# Patient Record
Sex: MALE | Race: WHITE | Employment: OTHER | URBAN - METROPOLITAN AREA
[De-identification: names, ages, dates, MRNs, and addresses within clinical notes are randomized per-mention and may not be internally consistent; named-entity substitution may affect disease eponyms.]

---

## 2017-06-28 ENCOUNTER — HOSPITAL ENCOUNTER (OUTPATIENT)
Dept: SURGERY | Age: 77
Discharge: HOME | End: 2017-06-28
Payer: MEDICARE

## 2017-06-28 VITALS
WEIGHT: 188 LBS | BODY MASS INDEX: 26.32 KG/M2 | SYSTOLIC BLOOD PRESSURE: 153 MMHG | DIASTOLIC BLOOD PRESSURE: 84 MMHG | OXYGEN SATURATION: 97 % | RESPIRATION RATE: 18 BRPM | HEIGHT: 71 IN | TEMPERATURE: 97.3 F | HEART RATE: 64 BPM

## 2017-06-28 LAB
ANION GAP BLD CALC-SCNC: 6 MMOL/L (ref 7–16)
APPEARANCE UR: CLEAR
BACTERIA SPEC CULT: ABNORMAL
BASOPHILS # BLD AUTO: 0 K/UL (ref 0–0.2)
BASOPHILS # BLD: 0 % (ref 0–2)
BILIRUB UR QL: NEGATIVE
BUN SERPL-MCNC: 20 MG/DL (ref 8–23)
CALCIUM SERPL-MCNC: 8.6 MG/DL (ref 8.3–10.4)
CHLORIDE SERPL-SCNC: 108 MMOL/L (ref 98–107)
CO2 SERPL-SCNC: 30 MMOL/L (ref 21–32)
COLOR UR: YELLOW
CREAT SERPL-MCNC: 0.99 MG/DL (ref 0.8–1.5)
DIFFERENTIAL METHOD BLD: ABNORMAL
EOSINOPHIL # BLD: 0.3 K/UL (ref 0–0.8)
EOSINOPHIL NFR BLD: 7 % (ref 0.5–7.8)
ERYTHROCYTE [DISTWIDTH] IN BLOOD BY AUTOMATED COUNT: 13.9 % (ref 11.9–14.6)
GLUCOSE SERPL-MCNC: 93 MG/DL (ref 65–100)
GLUCOSE UR STRIP.AUTO-MCNC: NEGATIVE MG/DL
HCT VFR BLD AUTO: 38.3 % (ref 41.1–50.3)
HGB BLD-MCNC: 12.7 G/DL (ref 13.6–17.2)
HGB UR QL STRIP: NEGATIVE
IMM GRANULOCYTES # BLD: 0 K/UL (ref 0–0.5)
IMM GRANULOCYTES NFR BLD AUTO: 0 % (ref 0–5)
KETONES UR QL STRIP.AUTO: NEGATIVE MG/DL
LEUKOCYTE ESTERASE UR QL STRIP.AUTO: NEGATIVE
LYMPHOCYTES # BLD AUTO: 47 % (ref 13–44)
LYMPHOCYTES # BLD: 2.4 K/UL (ref 0.5–4.6)
MCH RBC QN AUTO: 31.9 PG (ref 26.1–32.9)
MCHC RBC AUTO-ENTMCNC: 33.2 G/DL (ref 31.4–35)
MCV RBC AUTO: 96.2 FL (ref 79.6–97.8)
MONOCYTES # BLD: 0.6 K/UL (ref 0.1–1.3)
MONOCYTES NFR BLD AUTO: 13 % (ref 4–12)
NEUTS SEG # BLD: 1.6 K/UL (ref 1.7–8.2)
NEUTS SEG NFR BLD AUTO: 33 % (ref 43–78)
NITRITE UR QL STRIP.AUTO: NEGATIVE
PH UR STRIP: 6 [PH] (ref 5–9)
PLATELET # BLD AUTO: 110 K/UL (ref 150–450)
PMV BLD AUTO: 10.2 FL (ref 10.8–14.1)
POTASSIUM SERPL-SCNC: 4.4 MMOL/L (ref 3.5–5.1)
PROT UR STRIP-MCNC: NEGATIVE MG/DL
RBC # BLD AUTO: 3.98 M/UL (ref 4.23–5.67)
SERVICE CMNT-IMP: ABNORMAL
SODIUM SERPL-SCNC: 144 MMOL/L (ref 136–145)
SP GR UR REFRACTOMETRY: 1.03 (ref 1–1.02)
UROBILINOGEN UR QL STRIP.AUTO: 1 EU/DL (ref 0.2–1)
WBC # BLD AUTO: 5 K/UL (ref 4.3–11.1)

## 2017-06-28 PROCEDURE — 81003 URINALYSIS AUTO W/O SCOPE: CPT | Performed by: ORTHOPAEDIC SURGERY

## 2017-06-28 PROCEDURE — 80048 BASIC METABOLIC PNL TOTAL CA: CPT | Performed by: ORTHOPAEDIC SURGERY

## 2017-06-28 PROCEDURE — 87641 MR-STAPH DNA AMP PROBE: CPT | Performed by: ORTHOPAEDIC SURGERY

## 2017-06-28 PROCEDURE — 85025 COMPLETE CBC W/AUTO DIFF WBC: CPT | Performed by: ORTHOPAEDIC SURGERY

## 2017-06-28 NOTE — PERIOP NOTES
[facility]upirocin called into [facility]-pt contacted and informed to start mupirocin on 7-1-17- and to follow instructions on education sheet given pt verbalized understanding.

## 2017-06-28 NOTE — PERIOP NOTES
Thais/Dr. Von Miner informed of positive SA swab- verbalized understanding- starting mupirocin ointment

## 2017-06-28 NOTE — PERIOP NOTES
Patient verified name, , and surgery as listed in [facility]. TYPE  CASE:3  Orders per surgeonyesreceived  Labs per surgeon: cbc,bmp,urine,mrsa swab:labs -  Labs per anesthesia protocol: type and screen-dos results-  EKG  :  yes  MRSA/MSSA:pending  Pt instructed that they will be notified of positive results and will use mupirocin ointment as directed. Patient provided with handouts including guide to surgery , transfusions, pain management and hand hygiene for the family and community. Pt verbalizes understanding of all pre-op instructions . Instructed that family must be present in building at all times. Hibiclens and instructions given per hospital policy. Instructed patient to continue  previous medications as prescribed prior to surgery and  to take the following medications the day of surgery according to anesthesia guidelines : zantac as needed,tramadol       Continue all previous medications unless otherwise directed. Original medication prescription bottles none visualized during patient appointment. Instructed patient to hold  the following medications prior to surgery: plavix      Patient verbalized understanding of all instructions and provided all medical/health information to the best of their ability. Road to Recovery Spine surgery patient guide given. Instructed on incentive spirometry with return demonstration. Long handled prehab sponge given with instructions for use. Patient viewed spine prehab video.

## 2017-07-05 ENCOUNTER — ANESTHESIA EVENT (OUTPATIENT)
Dept: SURGERY | Age: 77
DRG: 458 | End: 2017-07-05
Payer: MEDICARE

## 2017-07-06 ENCOUNTER — ANESTHESIA (OUTPATIENT)
Dept: SURGERY | Age: 77
DRG: 458 | End: 2017-07-06
Payer: MEDICARE

## 2017-07-06 ENCOUNTER — HOSPITAL ENCOUNTER (INPATIENT)
Age: 77
LOS: 3 days | Discharge: HOME | DRG: 458 | End: 2017-07-09
Attending: ORTHOPAEDIC SURGERY | Admitting: ORTHOPAEDIC SURGERY
Payer: MEDICARE

## 2017-07-06 ENCOUNTER — APPOINTMENT (OUTPATIENT)
Dept: GENERAL RADIOLOGY | Age: 77
DRG: 458 | End: 2017-07-06
Attending: ORTHOPAEDIC SURGERY
Payer: MEDICARE

## 2017-07-06 LAB
ABO + RH BLD: NORMAL
BLOOD GROUP ANTIBODIES SERPL: NORMAL
SPECIMEN EXP DATE BLD: NORMAL

## 2017-07-06 PROCEDURE — 77030034760 HC NDL BN MAR ASPIR JAMSH STRY -B: Performed by: ORTHOPAEDIC SURGERY

## 2017-07-06 PROCEDURE — 77030019557 HC ELECTRD VES SEAL MEDT -F: Performed by: ORTHOPAEDIC SURGERY

## 2017-07-06 PROCEDURE — C1713 ANCHOR/SCREW BN/BN,TIS/BN: HCPCS | Performed by: ORTHOPAEDIC SURGERY

## 2017-07-06 PROCEDURE — 77030034850: Performed by: ORTHOPAEDIC SURGERY

## 2017-07-06 PROCEDURE — 77030008477 HC STYL SATN SLP COVD -A: Performed by: ANESTHESIOLOGY

## 2017-07-06 PROCEDURE — 76060000036 HC ANESTHESIA 2.5 TO 3 HR: Performed by: ORTHOPAEDIC SURGERY

## 2017-07-06 PROCEDURE — 77030012894: Performed by: ORTHOPAEDIC SURGERY

## 2017-07-06 PROCEDURE — 0SG0071 FUSION OF LUMBAR VERTEBRAL JOINT WITH AUTOLOGOUS TISSUE SUBSTITUTE, POSTERIOR APPROACH, POSTERIOR COLUMN, OPEN APPROACH: ICD-10-PCS | Performed by: ORTHOPAEDIC SURGERY

## 2017-07-06 PROCEDURE — 74011250636 HC RX REV CODE- 250/636

## 2017-07-06 PROCEDURE — 74011250637 HC RX REV CODE- 250/637: Performed by: ANESTHESIOLOGY

## 2017-07-06 PROCEDURE — 77030020782 HC GWN BAIR PAWS FLX 3M -B: Performed by: ANESTHESIOLOGY

## 2017-07-06 PROCEDURE — 77030019908 HC STETH ESOPH SIMS -A: Performed by: ANESTHESIOLOGY

## 2017-07-06 PROCEDURE — 77030014368: Performed by: ORTHOPAEDIC SURGERY

## 2017-07-06 PROCEDURE — 77030032490 HC SLV COMPR SCD KNE COVD -B: Performed by: ORTHOPAEDIC SURGERY

## 2017-07-06 PROCEDURE — 77030011640 HC PAD GRND REM COVD -A: Performed by: ORTHOPAEDIC SURGERY

## 2017-07-06 PROCEDURE — 77030018836 HC SOL IRR NACL ICUM -A: Performed by: ORTHOPAEDIC SURGERY

## 2017-07-06 PROCEDURE — 76010000172 HC OR TIME 2.5 TO 3 HR INTENSV-TIER 1: Performed by: ORTHOPAEDIC SURGERY

## 2017-07-06 PROCEDURE — 07DR3ZZ EXTRACTION OF ILIAC BONE MARROW, PERCUTANEOUS APPROACH: ICD-10-PCS | Performed by: ORTHOPAEDIC SURGERY

## 2017-07-06 PROCEDURE — 72100 X-RAY EXAM L-S SPINE 2/3 VWS: CPT

## 2017-07-06 PROCEDURE — 77030014650 HC SEAL MTRX FLOSEL BAXT -C: Performed by: ORTHOPAEDIC SURGERY

## 2017-07-06 PROCEDURE — 76210000017 HC OR PH I REC 1.5 TO 2 HR: Performed by: ORTHOPAEDIC SURGERY

## 2017-07-06 PROCEDURE — 74011250636 HC RX REV CODE- 250/636: Performed by: ANESTHESIOLOGY

## 2017-07-06 PROCEDURE — 77030037710: Performed by: ORTHOPAEDIC SURGERY

## 2017-07-06 PROCEDURE — 74011250636 HC RX REV CODE- 250/636: Performed by: ORTHOPAEDIC SURGERY

## 2017-07-06 PROCEDURE — 0SG00AJ FUSION OF LUMBAR VERTEBRAL JOINT WITH INTERBODY FUSION DEVICE, POSTERIOR APPROACH, ANTERIOR COLUMN, OPEN APPROACH: ICD-10-PCS | Performed by: ORTHOPAEDIC SURGERY

## 2017-07-06 PROCEDURE — 74011000302 HC RX REV CODE- 302: Performed by: ORTHOPAEDIC SURGERY

## 2017-07-06 PROCEDURE — 74011250637 HC RX REV CODE- 250/637: Performed by: ORTHOPAEDIC SURGERY

## 2017-07-06 PROCEDURE — 77030000001 HC SPCR SPN PEEK STRY -I2: Performed by: ORTHOPAEDIC SURGERY

## 2017-07-06 PROCEDURE — 86580 TB INTRADERMAL TEST: CPT | Performed by: ORTHOPAEDIC SURGERY

## 2017-07-06 PROCEDURE — 77030008703 HC TU ET UNCUF COVD -A: Performed by: ANESTHESIOLOGY

## 2017-07-06 PROCEDURE — 74011000250 HC RX REV CODE- 250

## 2017-07-06 PROCEDURE — 77030025623 HC BUR RND PRECIS STRY -D: Performed by: ORTHOPAEDIC SURGERY

## 2017-07-06 PROCEDURE — 0ST20ZZ RESECTION OF LUMBAR VERTEBRAL DISC, OPEN APPROACH: ICD-10-PCS | Performed by: ORTHOPAEDIC SURGERY

## 2017-07-06 PROCEDURE — 65270000029 HC RM PRIVATE

## 2017-07-06 PROCEDURE — 77030030163 HC BN WAX J&J -A: Performed by: ORTHOPAEDIC SURGERY

## 2017-07-06 PROCEDURE — 86900 BLOOD TYPING SEROLOGIC ABO: CPT | Performed by: ANESTHESIOLOGY

## 2017-07-06 PROCEDURE — 74011000250 HC RX REV CODE- 250: Performed by: ORTHOPAEDIC SURGERY

## 2017-07-06 PROCEDURE — 77030031139 HC SUT VCRL2 J&J -A: Performed by: ORTHOPAEDIC SURGERY

## 2017-07-06 PROCEDURE — 77030014007 HC SPNG HEMSTAT J&J -B: Performed by: ORTHOPAEDIC SURGERY

## 2017-07-06 DEVICE — POLYAXIAL SCREW
Type: IMPLANTABLE DEVICE | Site: SPINE LUMBAR | Status: FUNCTIONAL
Brand: XIA 3

## 2017-07-06 DEVICE — BLOCKER
Type: IMPLANTABLE DEVICE | Site: SPINE LUMBAR | Status: FUNCTIONAL
Brand: XIA 3

## 2017-07-06 DEVICE — BIO DBM PLUS PUTTY (WITH CANCELLOUS)
Type: IMPLANTABLE DEVICE | Site: SPINE LUMBAR | Status: FUNCTIONAL
Brand: BIO DBM

## 2017-07-06 DEVICE — VERTEBRAL SPACER
Type: IMPLANTABLE DEVICE | Site: SPINE LUMBAR | Status: FUNCTIONAL
Brand: AVS TL

## 2017-07-06 DEVICE — GRAFT BNE SUB 10CC 2-4MM GROWTH FACT ALLGRFT OSTEOAMP: Type: IMPLANTABLE DEVICE | Site: SPINE LUMBAR | Status: FUNCTIONAL

## 2017-07-06 DEVICE — TI ALLOY RAD ROD
Type: IMPLANTABLE DEVICE | Site: SPINE LUMBAR | Status: FUNCTIONAL
Brand: XIA 3

## 2017-07-06 RX ORDER — FAMOTIDINE 20 MG/1
20 TABLET, FILM COATED ORAL EVERY 12 HOURS
Status: DISCONTINUED | OUTPATIENT
Start: 2017-07-06 | End: 2017-07-09 | Stop reason: HOSPADM

## 2017-07-06 RX ORDER — NEOSTIGMINE METHYLSULFATE 1 MG/ML
INJECTION INTRAVENOUS AS NEEDED
Status: DISCONTINUED | OUTPATIENT
Start: 2017-07-06 | End: 2017-07-06 | Stop reason: HOSPADM

## 2017-07-06 RX ORDER — SODIUM CHLORIDE, SODIUM LACTATE, POTASSIUM CHLORIDE, CALCIUM CHLORIDE 600; 310; 30; 20 MG/100ML; MG/100ML; MG/100ML; MG/100ML
75 INJECTION, SOLUTION INTRAVENOUS CONTINUOUS
Status: DISCONTINUED | OUTPATIENT
Start: 2017-07-06 | End: 2017-07-06 | Stop reason: HOSPADM

## 2017-07-06 RX ORDER — SODIUM CHLORIDE 0.9 % (FLUSH) 0.9 %
5-10 SYRINGE (ML) INJECTION AS NEEDED
Status: DISCONTINUED | OUTPATIENT
Start: 2017-07-06 | End: 2017-07-09 | Stop reason: HOSPADM

## 2017-07-06 RX ORDER — FENTANYL CITRATE 50 UG/ML
INJECTION, SOLUTION INTRAMUSCULAR; INTRAVENOUS AS NEEDED
Status: DISCONTINUED | OUTPATIENT
Start: 2017-07-06 | End: 2017-07-06 | Stop reason: HOSPADM

## 2017-07-06 RX ORDER — ONDANSETRON 2 MG/ML
INJECTION INTRAMUSCULAR; INTRAVENOUS AS NEEDED
Status: DISCONTINUED | OUTPATIENT
Start: 2017-07-06 | End: 2017-07-06 | Stop reason: HOSPADM

## 2017-07-06 RX ORDER — HYDROMORPHONE HYDROCHLORIDE 2 MG/ML
0.5 INJECTION, SOLUTION INTRAMUSCULAR; INTRAVENOUS; SUBCUTANEOUS
Status: DISCONTINUED | OUTPATIENT
Start: 2017-07-06 | End: 2017-07-06 | Stop reason: HOSPADM

## 2017-07-06 RX ORDER — GUAIFENESIN 100 MG/5ML
81 LIQUID (ML) ORAL
Status: COMPLETED | OUTPATIENT
Start: 2017-07-06 | End: 2017-07-06

## 2017-07-06 RX ORDER — DOCUSATE SODIUM 100 MG/1
100 CAPSULE, LIQUID FILLED ORAL 2 TIMES DAILY
Status: DISCONTINUED | OUTPATIENT
Start: 2017-07-06 | End: 2017-07-09 | Stop reason: HOSPADM

## 2017-07-06 RX ORDER — ZOLPIDEM TARTRATE 5 MG/1
5 TABLET ORAL
Status: DISCONTINUED | OUTPATIENT
Start: 2017-07-06 | End: 2017-07-09 | Stop reason: HOSPADM

## 2017-07-06 RX ORDER — LEFLUNOMIDE 20 MG/1
20 TABLET ORAL
Status: DISCONTINUED | OUTPATIENT
Start: 2017-07-06 | End: 2017-07-09 | Stop reason: HOSPADM

## 2017-07-06 RX ORDER — HYDROCODONE BITARTRATE AND ACETAMINOPHEN 5; 325 MG/1; MG/1
2 TABLET ORAL AS NEEDED
Status: DISCONTINUED | OUTPATIENT
Start: 2017-07-06 | End: 2017-07-06 | Stop reason: HOSPADM

## 2017-07-06 RX ORDER — LIDOCAINE HYDROCHLORIDE 10 MG/ML
0.1 INJECTION INFILTRATION; PERINEURAL AS NEEDED
Status: DISCONTINUED | OUTPATIENT
Start: 2017-07-06 | End: 2017-07-06 | Stop reason: HOSPADM

## 2017-07-06 RX ORDER — VANCOMYCIN HYDROCHLORIDE 1 G/20ML
INJECTION, POWDER, LYOPHILIZED, FOR SOLUTION INTRAVENOUS AS NEEDED
Status: DISCONTINUED | OUTPATIENT
Start: 2017-07-06 | End: 2017-07-06 | Stop reason: HOSPADM

## 2017-07-06 RX ORDER — HYDROCODONE BITARTRATE AND ACETAMINOPHEN 7.5; 325 MG/1; MG/1
1 TABLET ORAL
Qty: 70 TAB | Refills: 0 | Status: SHIPPED | OUTPATIENT
Start: 2017-07-06 | End: 2017-10-12 | Stop reason: ALTCHOICE

## 2017-07-06 RX ORDER — TRAMADOL HYDROCHLORIDE 50 MG/1
50 TABLET ORAL
Status: DISCONTINUED | OUTPATIENT
Start: 2017-07-06 | End: 2017-07-09 | Stop reason: HOSPADM

## 2017-07-06 RX ORDER — DEXAMETHASONE SODIUM PHOSPHATE 4 MG/ML
INJECTION, SOLUTION INTRA-ARTICULAR; INTRALESIONAL; INTRAMUSCULAR; INTRAVENOUS; SOFT TISSUE AS NEEDED
Status: DISCONTINUED | OUTPATIENT
Start: 2017-07-06 | End: 2017-07-06 | Stop reason: HOSPADM

## 2017-07-06 RX ORDER — SODIUM CHLORIDE 0.9 % (FLUSH) 0.9 %
5-10 SYRINGE (ML) INJECTION EVERY 8 HOURS
Status: DISCONTINUED | OUTPATIENT
Start: 2017-07-06 | End: 2017-07-09 | Stop reason: HOSPADM

## 2017-07-06 RX ORDER — DIPHENHYDRAMINE HCL 25 MG
25 CAPSULE ORAL
Status: DISCONTINUED | OUTPATIENT
Start: 2017-07-06 | End: 2017-07-09 | Stop reason: HOSPADM

## 2017-07-06 RX ORDER — ATORVASTATIN CALCIUM 40 MG/1
40 TABLET, FILM COATED ORAL DAILY
Status: DISCONTINUED | OUTPATIENT
Start: 2017-07-07 | End: 2017-07-06

## 2017-07-06 RX ORDER — MORPHINE SULFATE 2 MG/ML
2 INJECTION, SOLUTION INTRAMUSCULAR; INTRAVENOUS
Status: DISCONTINUED | OUTPATIENT
Start: 2017-07-06 | End: 2017-07-09 | Stop reason: HOSPADM

## 2017-07-06 RX ORDER — LEVOTHYROXINE SODIUM 88 UG/1
88 TABLET ORAL
Status: DISCONTINUED | OUTPATIENT
Start: 2017-07-06 | End: 2017-07-09 | Stop reason: HOSPADM

## 2017-07-06 RX ORDER — CEFAZOLIN SODIUM IN 0.9 % NACL 2 G/50 ML
2 INTRAVENOUS SOLUTION, PIGGYBACK (ML) INTRAVENOUS ONCE
Status: COMPLETED | OUTPATIENT
Start: 2017-07-06 | End: 2017-07-06

## 2017-07-06 RX ORDER — LEVOTHYROXINE SODIUM 88 UG/1
88 TABLET ORAL DAILY
Status: DISCONTINUED | OUTPATIENT
Start: 2017-07-07 | End: 2017-07-06

## 2017-07-06 RX ORDER — MIDAZOLAM HYDROCHLORIDE 1 MG/ML
2 INJECTION, SOLUTION INTRAMUSCULAR; INTRAVENOUS
Status: DISCONTINUED | OUTPATIENT
Start: 2017-07-06 | End: 2017-07-06 | Stop reason: HOSPADM

## 2017-07-06 RX ORDER — GLYCOPYRROLATE 0.2 MG/ML
INJECTION INTRAMUSCULAR; INTRAVENOUS AS NEEDED
Status: DISCONTINUED | OUTPATIENT
Start: 2017-07-06 | End: 2017-07-06 | Stop reason: HOSPADM

## 2017-07-06 RX ORDER — OXYCODONE HYDROCHLORIDE 5 MG/1
5 TABLET ORAL
Status: DISCONTINUED | OUTPATIENT
Start: 2017-07-06 | End: 2017-07-06 | Stop reason: HOSPADM

## 2017-07-06 RX ORDER — ATORVASTATIN CALCIUM 40 MG/1
40 TABLET, FILM COATED ORAL
Status: DISCONTINUED | OUTPATIENT
Start: 2017-07-06 | End: 2017-07-09 | Stop reason: HOSPADM

## 2017-07-06 RX ORDER — ACETAMINOPHEN 325 MG/1
650 TABLET ORAL EVERY 6 HOURS
Status: DISCONTINUED | OUTPATIENT
Start: 2017-07-06 | End: 2017-07-09 | Stop reason: HOSPADM

## 2017-07-06 RX ORDER — LIDOCAINE HYDROCHLORIDE 20 MG/ML
INJECTION, SOLUTION EPIDURAL; INFILTRATION; INTRACAUDAL; PERINEURAL AS NEEDED
Status: DISCONTINUED | OUTPATIENT
Start: 2017-07-06 | End: 2017-07-06 | Stop reason: HOSPADM

## 2017-07-06 RX ORDER — OXYCODONE HYDROCHLORIDE 5 MG/1
5-10 TABLET ORAL
Status: DISCONTINUED | OUTPATIENT
Start: 2017-07-06 | End: 2017-07-09 | Stop reason: HOSPADM

## 2017-07-06 RX ORDER — FENTANYL CITRATE 50 UG/ML
100 INJECTION, SOLUTION INTRAMUSCULAR; INTRAVENOUS ONCE
Status: DISCONTINUED | OUTPATIENT
Start: 2017-07-06 | End: 2017-07-06 | Stop reason: HOSPADM

## 2017-07-06 RX ORDER — CEFAZOLIN SODIUM IN 0.9 % NACL 2 G/50 ML
2 INTRAVENOUS SOLUTION, PIGGYBACK (ML) INTRAVENOUS EVERY 8 HOURS
Status: COMPLETED | OUTPATIENT
Start: 2017-07-06 | End: 2017-07-07

## 2017-07-06 RX ORDER — ONDANSETRON 2 MG/ML
4 INJECTION INTRAMUSCULAR; INTRAVENOUS
Status: DISCONTINUED | OUTPATIENT
Start: 2017-07-06 | End: 2017-07-09 | Stop reason: HOSPADM

## 2017-07-06 RX ORDER — PROPOFOL 10 MG/ML
INJECTION, EMULSION INTRAVENOUS AS NEEDED
Status: DISCONTINUED | OUTPATIENT
Start: 2017-07-06 | End: 2017-07-06 | Stop reason: HOSPADM

## 2017-07-06 RX ORDER — FAMOTIDINE 20 MG/1
20 TABLET, FILM COATED ORAL ONCE
Status: COMPLETED | OUTPATIENT
Start: 2017-07-06 | End: 2017-07-06

## 2017-07-06 RX ORDER — MIDAZOLAM HYDROCHLORIDE 1 MG/ML
5 INJECTION, SOLUTION INTRAMUSCULAR; INTRAVENOUS ONCE
Status: DISCONTINUED | OUTPATIENT
Start: 2017-07-06 | End: 2017-07-06 | Stop reason: HOSPADM

## 2017-07-06 RX ORDER — NITROGLYCERIN 0.4 MG/1
0.4 TABLET SUBLINGUAL
Status: DISCONTINUED | OUTPATIENT
Start: 2017-07-06 | End: 2017-07-09 | Stop reason: HOSPADM

## 2017-07-06 RX ORDER — NALOXONE HYDROCHLORIDE 0.4 MG/ML
0.4 INJECTION, SOLUTION INTRAMUSCULAR; INTRAVENOUS; SUBCUTANEOUS
Status: DISCONTINUED | OUTPATIENT
Start: 2017-07-06 | End: 2017-07-09 | Stop reason: HOSPADM

## 2017-07-06 RX ORDER — SODIUM CHLORIDE, SODIUM LACTATE, POTASSIUM CHLORIDE, CALCIUM CHLORIDE 600; 310; 30; 20 MG/100ML; MG/100ML; MG/100ML; MG/100ML
75 INJECTION, SOLUTION INTRAVENOUS CONTINUOUS
Status: DISPENSED | OUTPATIENT
Start: 2017-07-06 | End: 2017-07-07

## 2017-07-06 RX ORDER — VECURONIUM BROMIDE FOR INJECTION 1 MG/ML
INJECTION, POWDER, LYOPHILIZED, FOR SOLUTION INTRAVENOUS AS NEEDED
Status: DISCONTINUED | OUTPATIENT
Start: 2017-07-06 | End: 2017-07-06 | Stop reason: HOSPADM

## 2017-07-06 RX ADMIN — FENTANYL CITRATE 100 MCG: 50 INJECTION, SOLUTION INTRAMUSCULAR; INTRAVENOUS at 09:31

## 2017-07-06 RX ADMIN — ONDANSETRON 4 MG: 2 INJECTION INTRAMUSCULAR; INTRAVENOUS at 11:34

## 2017-07-06 RX ADMIN — ASPIRIN 81 MG 81 MG: 81 TABLET ORAL at 08:56

## 2017-07-06 RX ADMIN — SODIUM CHLORIDE, SODIUM LACTATE, POTASSIUM CHLORIDE, AND CALCIUM CHLORIDE: 600; 310; 30; 20 INJECTION, SOLUTION INTRAVENOUS at 10:19

## 2017-07-06 RX ADMIN — ATORVASTATIN CALCIUM 40 MG: 40 TABLET, FILM COATED ORAL at 22:14

## 2017-07-06 RX ADMIN — CEFAZOLIN 2 G: 1 INJECTION, POWDER, FOR SOLUTION INTRAMUSCULAR; INTRAVENOUS; PARENTERAL at 09:28

## 2017-07-06 RX ADMIN — GLYCOPYRROLATE 0.6 MG: 0.2 INJECTION INTRAMUSCULAR; INTRAVENOUS at 11:34

## 2017-07-06 RX ADMIN — HYDROMORPHONE HYDROCHLORIDE 0.5 MG: 2 INJECTION, SOLUTION INTRAMUSCULAR; INTRAVENOUS; SUBCUTANEOUS at 12:38

## 2017-07-06 RX ADMIN — DEXAMETHASONE SODIUM PHOSPHATE 4 MG: 4 INJECTION, SOLUTION INTRA-ARTICULAR; INTRALESIONAL; INTRAMUSCULAR; INTRAVENOUS; SOFT TISSUE at 10:27

## 2017-07-06 RX ADMIN — HYDROMORPHONE HYDROCHLORIDE 0.5 MG: 2 INJECTION, SOLUTION INTRAMUSCULAR; INTRAVENOUS; SUBCUTANEOUS at 12:50

## 2017-07-06 RX ADMIN — HYDROMORPHONE HYDROCHLORIDE 0.5 MG: 2 INJECTION, SOLUTION INTRAMUSCULAR; INTRAVENOUS; SUBCUTANEOUS at 13:01

## 2017-07-06 RX ADMIN — TRAMADOL HYDROCHLORIDE 50 MG: 50 TABLET, FILM COATED ORAL at 16:17

## 2017-07-06 RX ADMIN — ACETAMINOPHEN 650 MG: 325 TABLET ORAL at 21:51

## 2017-07-06 RX ADMIN — SODIUM CHLORIDE, SODIUM LACTATE, POTASSIUM CHLORIDE, AND CALCIUM CHLORIDE 75 ML/HR: 600; 310; 30; 20 INJECTION, SOLUTION INTRAVENOUS at 08:51

## 2017-07-06 RX ADMIN — FENTANYL CITRATE 100 MCG: 50 INJECTION, SOLUTION INTRAMUSCULAR; INTRAVENOUS at 09:58

## 2017-07-06 RX ADMIN — FAMOTIDINE 20 MG: 20 TABLET ORAL at 21:51

## 2017-07-06 RX ADMIN — PROPOFOL 200 MG: 10 INJECTION, EMULSION INTRAVENOUS at 09:31

## 2017-07-06 RX ADMIN — FAMOTIDINE 20 MG: 20 TABLET ORAL at 08:56

## 2017-07-06 RX ADMIN — CEFAZOLIN 2 G: 1 INJECTION, POWDER, FOR SOLUTION INTRAMUSCULAR; INTRAVENOUS; PARENTERAL at 16:18

## 2017-07-06 RX ADMIN — Medication 5 ML: at 22:21

## 2017-07-06 RX ADMIN — LIDOCAINE HYDROCHLORIDE 100 MG: 20 INJECTION, SOLUTION EPIDURAL; INFILTRATION; INTRACAUDAL; PERINEURAL at 09:31

## 2017-07-06 RX ADMIN — Medication 10 ML: at 16:19

## 2017-07-06 RX ADMIN — SODIUM CHLORIDE, SODIUM LACTATE, POTASSIUM CHLORIDE, AND CALCIUM CHLORIDE 75 ML/HR: 600; 310; 30; 20 INJECTION, SOLUTION INTRAVENOUS at 16:18

## 2017-07-06 RX ADMIN — TUBERCULIN PURIFIED PROTEIN DERIVATIVE 5 UNITS: 5 INJECTION, SOLUTION INTRADERMAL at 16:35

## 2017-07-06 RX ADMIN — FENTANYL CITRATE 50 MCG: 50 INJECTION, SOLUTION INTRAMUSCULAR; INTRAVENOUS at 11:40

## 2017-07-06 RX ADMIN — VECURONIUM BROMIDE FOR INJECTION 6 MG: 1 INJECTION, POWDER, LYOPHILIZED, FOR SOLUTION INTRAVENOUS at 09:31

## 2017-07-06 RX ADMIN — SODIUM CHLORIDE, SODIUM LACTATE, POTASSIUM CHLORIDE, AND CALCIUM CHLORIDE: 600; 310; 30; 20 INJECTION, SOLUTION INTRAVENOUS at 11:36

## 2017-07-06 RX ADMIN — OXYCODONE HYDROCHLORIDE 5 MG: 5 TABLET ORAL at 13:38

## 2017-07-06 RX ADMIN — LEVOTHYROXINE SODIUM 88 MCG: 88 TABLET ORAL at 22:14

## 2017-07-06 RX ADMIN — VECURONIUM BROMIDE FOR INJECTION 1 MG: 1 INJECTION, POWDER, LYOPHILIZED, FOR SOLUTION INTRAVENOUS at 11:02

## 2017-07-06 RX ADMIN — CEFAZOLIN 2 G: 1 INJECTION, POWDER, FOR SOLUTION INTRAMUSCULAR; INTRAVENOUS; PARENTERAL at 22:14

## 2017-07-06 RX ADMIN — FENTANYL CITRATE 50 MCG: 50 INJECTION, SOLUTION INTRAMUSCULAR; INTRAVENOUS at 10:14

## 2017-07-06 RX ADMIN — NEOSTIGMINE METHYLSULFATE 4 MG: 1 INJECTION INTRAVENOUS at 11:34

## 2017-07-06 RX ADMIN — OXYCODONE HYDROCHLORIDE 5 MG: 5 TABLET ORAL at 20:00

## 2017-07-06 RX ADMIN — DOCUSATE SODIUM 100 MG: 100 CAPSULE, LIQUID FILLED ORAL at 21:51

## 2017-07-06 RX ADMIN — VECURONIUM BROMIDE FOR INJECTION 1 MG: 1 INJECTION, POWDER, LYOPHILIZED, FOR SOLUTION INTRAVENOUS at 10:19

## 2017-07-06 NOTE — PROGRESS NOTES
Admission database completed. Patient oriented to room and call button.    Medication side effects fact sheet  given to patient and reviewed No concerns voiced at this time Primary nurse  updated

## 2017-07-06 NOTE — OP NOTES
[address] [address]   [phone]    OPERATIVE REPORT    Patient ID:Jairon Lugo  [id number]  1940  67 y.o. DATE OF SURGERY: 7/6/2017    SURGEON: Shonda Bains MD    ASSISTANT: Tamar Amezcua [facility]    A skilled  was deemed necessary for this case due to the intimate proximity of the thecal sac and spinal nerve roots. He was there for the entire duration of the case. PREOP DIAGNOSIS: Lumbar stenosis and kyphosis above a prior lumbar instrumented fusion. POSTOP DIAGNOSIS: Lumbar stenosis and kyphosis above a prior lumbar instrumented fusion. PROCEDURE:  1. Lumbar laminectomy L2 through L3  with bilateral foraminotomies. 2. Lumbar posterolateral fusion  L2 through L3 .  3. Pedicle screw instrumentation  L2 through L5 .  4. Bone marrow aspirate for allograft  5. Translumbar interbody fusion L2 through L3 .  6. Insertion biomechanical device L2 through L3   7. Local autograft  7. Use of intraoperative microscope for visualization of the neural elements. ANESTHESIA: General    ESTIMATED BLOOD LOSS:  650 ml    INTRAOPERATIVE COMPLICATIONS: None. POSTOP CONDITION: Stable. IMPLANTS:   Implant Name Type Inv.  Item Serial No.  Lot No. LRB No. Used Action   GRAFT BNE GRAN DBM 2-4MM 10CC -- OSTEOAMP - [id number]  GRAFT BNE GRAN DBM 2-4MM 10CC -- OSTEOAMP [id number] [employer]  N/A 1 Implanted   GRAFT DBM PTTY+ W/CHIP 10ML -- BIO DBM - [id number]  GRAFT DBM PTTY+ W/CHIP 10ML -- BIO DBM  ARCELIA SPINE HOW [id number] N/A 1 Implanted   SCR SPNE BHUMI 3 6.5X50MM TI --  - [id number]  SCR SPNE BHUMI 3 6.5X50MM TI --   ARCELIA SPINE HOW [id number] N/A 1 Implanted   SCR SPNE BHUMI 3 6.5X45MM TI --  - [id number]  SCR SPNE BHUMI 3 6.5X45MM TI --   ARCELIA SPINE HOW [id number] N/A 1 Implanted   KASHIF SPNE BHUMI 3 6.7P257WC TI --  - [id number]  KASHIF SPNE BHUMI 3 6.8O209VN TI --   ARCELIA SPINE HOW [id number] N/A 2 Implanted   SPACER MARBELLA ATKINSON 4D 7X25 --  - [id number]  SPACER SPNE VERT AVS 4D 7X25 --   ARCELIA SPINE HOWM [id number] N/A 1 Implanted   BLOCKER SPNE BHUMI 3 TI --  - [id number]   BLOCKER SPNE BHUMI 3 TI --    ARCELIA SPINE HOWM [id number] N/A 8 Implanted       INDICATIONS FOR PROCEDURE: Patient has had low back pain with radiation to the buttocks and lower extremities for an extended period of time. The symptoms and exam findings were felt to be consistent with neurogenic claudication. The preoperative radiographs and other imaging confirmed showed spondylolisthesis and stenosis. Conservative measures have been exhausted as outlined in the H&P. The symptoms progressed to the point where there is difficulty performing any task that requires prolonged standing or walking which interfered with activities of daily living and ability to enjoy life. In the outpatient setting the risks, benefits and potential complications of the above-listed procedure were discussed with her and an informed consent was obtained. DESCRIPTION OF PROCEDURE: After adequate induction of general anesthesia, the patient was positioned prone on the [facility] spinal table. Care was taken to pad all bony prominences. The shoulders and elbows were placed in the 90/90 position. The abdomen was allowed to hang free to decrease intraabdominal and venous pressure. The lumbar area was prepped and draped in the usual sterile fashion. Preoperative antibiotics were administered. A time out was called to confirm appropriate patient, proposed procedure and proposed incision site. With this confirmation, an incision was created in the midline of the back over the lumbar spinous processes. . Dissection was carried down through the skin and subcutaneous tissues to the level of the lumbodorsal fascia. The lumbar dorsal fascia was released off of the spinous processes.  The paraspinous musculature was elevated in a subperiosteal fashion in a lateral direction off of the lamina and over prior instrumentation and the the facet joints to be fused. The prior set screws and rods were removed. The screws were checked for stability and felt to be solid. The pedicle screws were left in place. The L2 through L3 transverse processes were exposed to their lateral tips. All soft tissue was elevated off of the intertransverse membrane laterally in preparation for a fusion bed. With the posterior lateral dissection completed, attention was directed centrally where a Leksell rongeur was used to resect the spinous processes of L2 through L3. The 4 mm ind[place] was then used to thin the lamina to an egg shell like thickness. The operative microscope was brought to the field and use for visualization of the neural elements during the decompression. A triple-0 angled curet was used to elevate the ligamentum flavum off of its origin on the caudal surface of the L2 lamina as well as off the cephalad surface of the L3 lamina. The ligamentum flavum was elevated from the thecal sac and a plane was created in the epidural space with a [place] elevator. A 4 mm Kerrison was used to perform a central laminectomy of L3 and this was carried cephalad through L2. The central laminectomy was widened to the medial border of the pedicle at each level. With the central laminectomy completed, a 4 mm Kerrison was used to undercut the lateral recess along the entire length of the laminectomy site. Then using the [facility] elevator to retract the thecal sac and identify each of the nerve roots, partial foraminotomies were performed and each nerve was visualized and decompressed bilaterally. With this, attention was directed toward the  iliac crest where a separate fascial incision was created over the posterior-superior iliac spine. The 8 gauge needle was impacted into the posterior superior iliac spine to a depth of about 2 cm. Bone marrow aspirate was obtained and spread over the allograft bone.   The needle was removed and pressure applied over the posterior superior iliac spine. Attention was re-directed towards the lumbar wound. The veronika nerve root retractor was used to retrace the thecal sac overlying the L2 - L3  disc space. Care was taken to protect the exiting and descending nerve roots at all times. An annulotomy was then performed with a 15-blade. A complete discectomy was performed using a series of curets and rongeurs. The interbody sizing system was brought to the field and the sizing paddles were used sequentially to an appropriate fit. The endplates were prepared for fusion using the rasps. A trial interbody device was sized and inserted. Fluoroscopic images were obtained of the trial in both planes. The final PEEK implant was the opened and packed with local autograft. Additional local bone graft was placed anteriorly in the interbody space. The PEEK cage was then impacted and rotated appropriately. Again fluoroscopy was ws used to confirm cage positioning. The 4 mm india was used to decorticate the previously exposed transverse processes and lateral aspect of the facet joints and pars intra-articularis. The Kingsley Kissousa spinal instrumentation system was brought to the field and using a free hand intersection technique, pedicle screws were placed bilaterally in L2. The medial border of the pedicle was visualized through the spinal canal to confirm no medial or inferior breech. This was felt to be satisfactory. At this point the bone marrow soaked allograft was then packed into the lateral gutters beneath the screw heads, along the decorticated transverse processes and lateral facet joints for the arthrodesis. Appropriately sized rods were then selected and bent into additional lordosis and laid into the pedicle screw heads extending from L2 through L5. The set screws were then applied and tightened to the appropriate torque.  C-arm fluoroscopy was brought in and used to obtain images to confirm appropriate hardware level and placement. This was felt to be satisfactory. With this, the wound was liberally irrigated and a hemovac drain was inserted through a separate incision in a subfascial plane. The lumbodorsal fascia was approximated with a # 1 Vicryl suture in an interrupted fashion. The subcutaneous tissue and skin were approximated in a layered fashion. Benzoin and Steri-Strips were applied. Sterile dressings were applied. The patient tolerated the procedure well and was returned to the postanesthesia care unit in stable condition. At the end of the case, all sponge, needle, and instrument counts were correct.       Cira Thomson MD

## 2017-07-06 NOTE — IP AVS SNAPSHOT
Current Discharge Medication List  
  
START taking these medications Dose & Instructions Dispensing Information Comments Morning Noon Evening Bedtime HYDROcodone-acetaminophen 7.5-325 mg per tablet Commonly known as:  Oscar Collar Your last dose was: Your next dose is:    
   
   
 Dose:  1 Tab Take 1 Tab by mouth every four (4) hours as needed for Pain. Max Daily Amount: 6 Tabs. Quantity:  70 Tab Refills:  0 CONTINUE these medications which have CHANGED Dose & Instructions Dispensing Information Comments Morning Noon Evening Bedtime  
 atorvastatin 40 mg tablet Commonly known as:  LIPITOR What changed:  See the new instructions. Your last dose was: Your next dose is: TAKE 1 TABLET EVERY DAY Quantity:  90 Tab Refills:  1  
     
   
   
   
  
 clopidogrel 75 mg Tab Commonly known as:  PLAVIX What changed:  See the new instructions. Your last dose was: Your next dose is: TAKE 1 TABLET EVERY DAY Quantity:  90 Tab Refills:  0  
     
   
   
   
  
 fluticasone 50 mcg/actuation nasal spray Commonly known as:  Raymond Balderrama What changed:   
- when to take this 
- reasons to take this Your last dose was: Your next dose is:    
   
   
 Dose:  2 Spray 2 Sprays by Both Nostrils route daily. Quantity:  1 Bottle Refills:  3  
     
   
   
   
  
 leflunomide 20 mg tablet Commonly known as:  Osbaldowilfredo De Souza What changed:   
- how much to take 
- how to take this - when to take this 
- additional instructions Your last dose was: Your next dose is: TAKE 1 TABLET EVERY DAY Quantity:  90 Tab Refills:  1  
     
   
   
   
  
 levothyroxine 88 mcg tablet Commonly known as:  SYNTHROID What changed:  See the new instructions. Your last dose was: Your next dose is: TAKE 1 TAB BY MOUTH DAILY (BEFORE BREAKFAST). Quantity:  90 Tab Refills:  3 CONTINUE these medications which have NOT CHANGED Dose & Instructions Dispensing Information Comments Morning Noon Evening Bedtime  
 aspirin delayed-release 81 mg tablet Your last dose was: Your next dose is:    
   
   
 Dose:  81 mg Take 81 mg by mouth nightly. Refills:  0 BACTROBAN NASAL 2 % nasal ointment Generic drug:  mupirocin calcium Your last dose was: Your next dose is:    
   
   
 by Both Nostrils route two (2) times a day. Refills:  0  
     
   
   
   
  
 nitroglycerin 0.4 mg SL tablet Commonly known as:  NITROSTAT Your last dose was: Your next dose is:    
   
   
 Dose:  0.4 mg  
1 Tab by SubLINGual route every five (5) minutes as needed for Chest Pain. Quantity:  1 Bottle Refills:  3  
     
   
   
   
  
 raNITIdine 150 mg tablet Commonly known as:  ZANTAC Your last dose was: Your next dose is:    
   
   
 Dose:  150 mg Take 150 mg by mouth as needed. Refills:  0 REMICADE IV Your last dose was: Your next dose is:    
   
   
 by IntraVENous route. Every 8 weeks Refills:  0  
     
   
   
   
  
 traMADol 50 mg tablet Commonly known as:  ULTRAM  
   
Your last dose was: Your next dose is: TAKE 1 TABLET BY MOUTH 3 TIMES A DAY- Refills:  0 Where to Get Your Medications Information on where to get these meds will be given to you by the nurse or doctor. ! Ask your nurse or doctor about these medications HYDROcodone-acetaminophen 7.5-325 mg per tablet

## 2017-07-06 NOTE — PERIOP NOTES
TRANSFER - OUT REPORT:    Verbal report given to Al RN (name) on Cholo Uribe  being transferred to 703(unit) for routine post - op       Report consisted of patients Situation, Background, Assessment and   Recommendations(SBAR). Information from the following report(s) SBAR, OR Summary, Intake/Output and MAR was reviewed with the receiving nurse. Lines:   Peripheral IV 11/22/11 Left Hand (Active)       Peripheral IV 11/22/11 Right;Posterior Hand (Active)       Peripheral IV 03/06/13 Left Arm (Active)       Peripheral IV 07/06/17 Right; Lower Forearm (Active)   Site Assessment Clean, dry, & intact 7/6/2017  1:04 PM   Phlebitis Assessment 0 7/6/2017  1:04 PM   Infiltration Assessment 0 7/6/2017  1:04 PM   Dressing Status Clean, dry, & intact 7/6/2017  1:04 PM   Dressing Type Transparent 7/6/2017  1:04 PM   Hub Color/Line Status Capped 7/6/2017  1:04 PM       Peripheral IV 07/06/17 Left Hand (Active)   Site Assessment Clean, dry, & intact 7/6/2017  1:04 PM   Phlebitis Assessment 0 7/6/2017  1:04 PM   Infiltration Assessment 0 7/6/2017  1:04 PM   Dressing Status Clean, dry, & intact 7/6/2017  1:04 PM   Dressing Type Transparent 7/6/2017  1:04 PM   Hub Color/Line Status Infusing 7/6/2017  1:04 PM        Opportunity for questions and clarification was provided. Patient transported with:   O2 @ 2 liters    VTE prophylaxis orders have been written for Cholo Uribe. Patient and family given floor number and nurses name. Family updated re: pt status after security code verified.

## 2017-07-06 NOTE — ANESTHESIA PREPROCEDURE EVALUATION
Anesthetic History   No history of anesthetic complications            Review of Systems / Medical History  Patient summary reviewed and pertinent labs reviewed    Pulmonary        Sleep apnea: No treatment           Neuro/Psych   Within defined limits           Cardiovascular              Past MI (2001), CAD and cardiac stents (4 stents last 2014 on plavix & aspirin)    Exercise tolerance: >4 METS     GI/Hepatic/Renal     GERD: well controlled           Endo/Other      Hypothyroidism: well controlled  Arthritis (rheumatoid)     Other Findings            Physical Exam    Airway  Mallampati: III  TM Distance: < 4 cm  Neck ROM: normal range of motion   Mouth opening: Normal     Cardiovascular  Regular rate and rhythm,  S1 and S2 normal,  no murmur, click, rub, or gallop             Dental         Pulmonary  Breath sounds clear to auscultation               Abdominal  GI exam deferred       Other Findings            Anesthetic Plan    ASA: 3  Anesthesia type: general          Induction: Intravenous  Anesthetic plan and risks discussed with: Patient and Family

## 2017-07-06 NOTE — PROGRESS NOTES
TRANSFER - IN REPORT:    Verbal report received from (Zhen Amado) on Ardyce Chin  being received from [facility](unit) for routine post - op      Report consisted of patients Situation, Background, Assessment and   Recommendations(SBAR). Information from the following report(s) SBAR was reviewed with the receiving nurse. Opportunity for questions and clarification was provided. Assessment completed upon patients arrival to unit and care assumed.

## 2017-07-06 NOTE — IP AVS SNAPSHOT
[address] 
[phone] Patient: Trenton Rose MRN: [id number] THU:6/7/[id number] You are allergic to the following Allergen Reactions Citric Acid Other (comments) Lisinopril Other (comments) lightheadedness Immunizations Administered for This Admission Name Date  
 TB Skin Test (PPD) Intradermal 7/6/2017 Recent Documentation Height Weight BMI Smoking Status 1.803 m 84.9 kg 26.12 kg/m2 Former Smoker Unresulted Labs Order Current Status PLEASE READ & DOCUMENT PPD TEST IN 24 HRS Preliminary result Emergency Contacts Name Discharge Info Relation Home Work Mobile Areta Chick  Child [2] [phone] Felicita Mak DISCHARGE CAREGIVER [3] Spouse [3] [phone] About your hospitalization You were admitted on:  July 6, 2017 You last received care in the:  [facility] MED SURG You were discharged on:  July 9, 2017 Unit phone number:  [phone] Why you were hospitalized Your primary diagnosis was:  Not on File Your diagnoses also included:  Lumbar Stenosis With Neurogenic Claudication Providers Seen During Your Hospitalizations Provider Role Specialty Primary office phone Buffy Kim MD Attending Provider Orthopedic Surgery [phone] Your Primary Care Physician (PCP) Primary Care Physician Office Phone Office Fax Ania Stoll [phone] Follow-up Information Follow up With Details Comments Contact Info Zev Moreno MD Call As needed [address] [address] [phone] Buffy Kim MD On 7/24/2017 8:20 AM @ [place] [address] [employer] [facility] [address] 
[phone] Your Appointments Tuesday July 18, 2017 10:00 AM EDT  
infusion with [facility] NURSE ONLY [facility] OSTEOPOROSIS AND ARTHRITIS ([address] [facility] [address]) [address] [address] [phone] Current Discharge Medication List  
  
START taking these medications Dose & Instructions Dispensing Information Comments Morning Noon Evening Bedtime HYDROcodone-acetaminophen 7.5-325 mg per tablet Commonly known as:  Mackenzie Sol Your last dose was: Your next dose is:    
   
   
 Dose:  1 Tab Take 1 Tab by mouth every four (4) hours as needed for Pain. Max Daily Amount: 6 Tabs. Quantity:  70 Tab Refills:  0 CONTINUE these medications which have CHANGED Dose & Instructions Dispensing Information Comments Morning Noon Evening Bedtime  
 atorvastatin 40 mg tablet Commonly known as:  LIPITOR What changed:  See the new instructions. Your last dose was: Your next dose is: TAKE 1 TABLET EVERY DAY Quantity:  90 Tab Refills:  1  
     
   
   
   
  
 clopidogrel 75 mg Tab Commonly known as:  PLAVIX What changed:  See the new instructions. Your last dose was: Your next dose is: TAKE 1 TABLET EVERY DAY Quantity:  90 Tab Refills:  0  
     
   
   
   
  
 fluticasone 50 mcg/actuation nasal spray Commonly known as:  Mariangel Hope What changed:   
- when to take this 
- reasons to take this Your last dose was: Your next dose is:    
   
   
 Dose:  2 Spray 2 Sprays by Both Nostrils route daily. Quantity:  1 Bottle Refills:  3  
     
   
   
   
  
 leflunomide 20 mg tablet Commonly known as:  Magda Gann What changed:   
- how much to take 
- how to take this - when to take this 
- additional instructions Your last dose was: Your next dose is: TAKE 1 TABLET EVERY DAY Quantity:  90 Tab Refills:  1  
     
   
   
   
  
 levothyroxine 88 mcg tablet Commonly known as:  SYNTHROID What changed:  See the new instructions. Your last dose was: Your next dose is: TAKE 1 TAB BY MOUTH DAILY (BEFORE BREAKFAST). Quantity:  90 Tab Refills:  3 CONTINUE these medications which have NOT CHANGED Dose & Instructions Dispensing Information Comments Morning Noon Evening Bedtime  
 aspirin delayed-release 81 mg tablet Your last dose was: Your next dose is:    
   
   
 Dose:  81 mg Take 81 mg by mouth nightly. Refills:  0 BACTROBAN NASAL 2 % nasal ointment Generic drug:  mupirocin calcium Your last dose was: Your next dose is:    
   
   
 by Both Nostrils route two (2) times a day. Refills:  0  
     
   
   
   
  
 nitroglycerin 0.4 mg SL tablet Commonly known as:  NITROSTAT Your last dose was: Your next dose is:    
   
   
 Dose:  0.4 mg  
1 Tab by SubLINGual route every five (5) minutes as needed for Chest Pain. Quantity:  1 Bottle Refills:  3  
     
   
   
   
  
 raNITIdine 150 mg tablet Commonly known as:  ZANTAC Your last dose was: Your next dose is:    
   
   
 Dose:  150 mg Take 150 mg by mouth as needed. Refills:  0 REMICADE IV Your last dose was: Your next dose is:    
   
   
 by IntraVENous route. Every 8 weeks Refills:  0  
     
   
   
   
  
 traMADol 50 mg tablet Commonly known as:  ULTRAM  
   
Your last dose was: Your next dose is: TAKE 1 TABLET BY MOUTH 3 TIMES A DAY- Refills:  0 Where to Get Your Medications Information on where to get these meds will be given to you by the nurse or doctor. ! Ask your nurse or doctor about these medications HYDROcodone-acetaminophen 7.5-325 mg per tablet Discharge Instructions Lumbar Laminectomy: What to Expect at [facility] Your Recovery You can expect your back to feel stiff or sore after surgery. This should improve in the weeks after surgery. You may have trouble sitting or standing in one position for very long and may need pain medicine in the weeks after your surgery. Your doctor may advise you to work with a physical therapist to strengthen the muscles around your spine and trunk. You will need to learn how to lift, twist, and bend so that you do not put too much strain on your back. This care sheet gives you a general idea about how long it will take for you to recover. But each person recovers at a different pace. Follow the steps below to get better as quickly as possible. How can you care for yourself at home? Activity · Rest when you feel tired. Getting enough sleep will help you recover. · Try to walk each day. Start by walking a little more than you did the day before. Bit by bit, increase the amount you walk. Walking boosts blood flow and helps prevent pneumonia and constipation. Walking may also decrease your muscle soreness after surgery. · If advised by your doctor, you may need to avoid lifting anything that would cause excessive strain on your back. This may include a child, heavy grocery bags and milk containers, a heavy briefcase or backpack, cat litter or dog food bags, or a vacuum . · Avoid strenuous activities, such as bicycle riding, jogging, weight lifting, or aerobic exercise, until your doctor says it is okay. · Do not drive for 2 to 4 weeks after your surgery or until your doctor says it is okay. · Avoid riding in a car for more than 30 minutes at a time for 2 to 4 weeks after surgery. If you must ride in a car for a longer distance, stop often to walk and stretch your legs. · Try to change your position about every 30 minutes while sitting or standing. This will help decrease your back pain while you are healing. · You will probably need to take 4 to 6 weeks off from work. It depends on the type of work you do and how you feel. · You may have sex as soon as you feel able, but avoid positions that put stress on your back or cause pain. Diet · You can eat your normal diet. If your stomach is upset, try bland, low-fat foods like plain rice, broiled chicken, toast, and yogurt. · Drink plenty of fluids (unless your doctor tells you not to). · You may notice that your bowel movements are not regular right after your surgery. This is common. Try to avoid constipation and straining with bowel movements. You may want to take a fiber supplement every day. If you have not had a bowel movement after a couple of days, ask your doctor about taking a mild laxative. Medicines · Your doctor will tell you if and when you can restart your medicines. He or she will also give you instructions about taking any new medicines. · If you take blood thinners, such as warfarin (Coumadin), clopidogrel (Plavix), or aspirin, be sure to talk to your doctor. He or she will tell you if and when to start taking those medicines again. Make sure that you understand exactly what your doctor wants you to do. · Take pain medicines exactly as directed. ¨ If the doctor gave you a prescription medicine for pain, take it as prescribed. ¨ If you are not taking a prescription pain medicine, ask your doctor if you can take an over-the-counter medicine. · If your doctor prescribed antibiotics, take them as directed. Do not stop taking them just because you feel better. You need to take the full course of antibiotics. · If you think your pain medicine is making you sick to your stomach: 
¨ Take your medicine after meals (unless your doctor has told you not to). ¨ Ask your doctor for a different pain medicine. Incision care · If you have strips of tape on the cut (incision) the doctor made, leave the tape on for a week or until it falls off. · Wash the area daily with warm, soapy water and pat it dry. · Keep the area clean and dry. You may cover it with a gauze bandage if it weeps or rubs against clothing. Change the bandage every day. Exercise · Do back exercises as instructed by your doctor. · Your doctor may advise you to work with a physical therapist to improve the strength and flexibility of your back. Other instructions · To reduce stiffness and help sore muscles, use a warm water bottle, a heating pad set on low, or a warm cloth on your back. Do not put heat right over the incision. Do not go to sleep with a heating pad on your skin. Follow-up care is a key part of your treatment and safety. Be sure to make and go to all appointments, and call your doctor if you are having problems. It's also a good idea to know your test results and keep a list of the medicines you take. When should you call for help? Call 911 anytime you think you may need emergency care. For example, call if: 
· You passed out (lost consciousness). · You have sudden chest pain and shortness of breath, or you cough up blood. · You are unable to move a leg at all. Call your doctor now or seek immediate medical care if: 
· You have new or worse symptoms in your legs or buttocks. Symptoms may include: ¨ Numbness or tingling. ¨ Weakness. ¨ Pain. · You lose bladder or bowel control. · You have loose stitches, or your incision comes open. · You have blood or fluid draining from the incision. · You have signs of infection, such as: 
¨ Increased pain, swelling, warmth, or redness. ¨ Pus draining from the incision. ¨ A fever. ¨ Red streaks leading from the incision. Watch closely for changes in your health, and be sure to contact your doctor if: 
· You do not have a bowel movement after taking a laxative. · You are not getting better as expected. Where can you learn more? Go to http://pricila-jose.info/. Enter X946 in the search box to learn more about \"Lumbar Laminectomy: What to Expect at Home. \" Current as of: March 21, 2017 Content Version: 11.3 © 5729-2810 [employer]. Care instructions adapted under license by [employer] (which disclaims liability or warranty for this information). If you have questions about a medical condition or this instruction, always ask your healthcare professional. [place] [address] any warranty or liability for your use of this information. Discharge Orders None ACO Transitions of Care Introducing [employer] [address] Warner offers a voluntary care coordination program to provide high quality service and care to [facility] fee-for-service beneficiaries. Ananda Samuels was designed to help you enhance your health and well-being through the following services: ? Transitions of Care  support for individuals who are transitioning from one care setting to another (example: Hospital to home). ? Chronic and Complex Care Coordination  support for individuals and caregivers of those with serious or chronic illnesses or with more than one chronic (ongoing) condition and those who take a number of different medications. If you meet specific medical criteria, a [address] [place] [address] Rd may call you directly to coordinate your care with your primary care physician and your other care providers. For questions about the [facility] programs, please, contact your physicians office. For general questions or additional information about Accountable Care Organizations: 
Please visit www.medicare.gov/acos. html or call 1-800-MEDICARE (0-424.528.6260) TTY users should call 0-781.579.4854. Introducing [facility] & HEALTH SERVICES! Dear Marco Sung: Thank you for requesting a [employer] account.   Our records indicate that you already have an active Lagoon account. You can access your account anytime at [email or web address] Did you know that you can access your hospital and ER discharge instructions at any time in Lagoon? You can also review all of your test results from your hospital stay or ER visit. Additional Information If you have questions, please visit the Frequently Asked Questions section of the [employer] website at [email or web address] Remember, [employer]oon is NOT to be used for urgent needs. For medical emergencies, dial 911. Now available from your iPhone and Android! General Information Please provide this summary of care documentation to your next provider. Patient Signature:  ____________________________________________________________ Date:  ____________________________________________________________  
  
Larri Hazard Provider Signature:  ____________________________________________________________ Date:  ____________________________________________________________

## 2017-07-06 NOTE — ANESTHESIA POSTPROCEDURE EVALUATION
Post-Anesthesia Evaluation and Assessment    Patient: Piper Yee MRN: [id number]  SSN: [id number]    YOB: 1940  Age: 67 y.o. Sex: male       Cardiovascular Function/Vital Signs  Visit Vitals    /69    Pulse 80    Temp 37.1 °C (98.8 °F)    Resp 13    Ht 5' 11\" (1.803 m)    Wt 84.9 kg (187 lb 4 oz)    SpO2 96%    BMI 26.12 kg/m2       Patient is status post general anesthesia for Procedure(s):  L2-L3 LAMINECTOMY AND FUSION WITH BONE MARROW ASPIRATE, ALLOGRAFT, INSTRUMENTATION L2-L5 WITH TLIF . Nausea/Vomiting: None    Postoperative hydration reviewed and adequate. Pain:  Pain Scale 1: Numeric (0 - 10) (07/06/17 1338)  Pain Intensity 1: 4 (07/06/17 1338)   Managed    Neurological Status:   Neuro (WDL): Within Defined Limits (07/06/17 1304)  Neuro  Neurologic State: Sleeping (07/06/17 1213)  LUE Motor Response: Purposeful (07/06/17 1213)  LLE Motor Response: Purposeful (07/06/17 1213)  RUE Motor Response: Purposeful (07/06/17 1213)  RLE Motor Response: Purposeful (07/06/17 1213)   At baseline    Mental Status and Level of Consciousness: Arousable    Pulmonary Status:   O2 Device: Room air (07/06/17 1304)   Adequate oxygenation and airway patent    Complications related to anesthesia: None    Post-anesthesia assessment completed.  No concerns    Signed By: Bessie Moralez MD     July 6, 2017

## 2017-07-07 LAB
ANION GAP BLD CALC-SCNC: 8 MMOL/L (ref 7–16)
ATRIAL RATE: 59 BPM
BUN SERPL-MCNC: 17 MG/DL (ref 8–23)
CALCIUM SERPL-MCNC: 7.8 MG/DL (ref 8.3–10.4)
CALCULATED P AXIS, ECG09: 43 DEGREES
CALCULATED R AXIS, ECG10: 7 DEGREES
CALCULATED T AXIS, ECG11: 30 DEGREES
CHLORIDE SERPL-SCNC: 108 MMOL/L (ref 98–107)
CO2 SERPL-SCNC: 26 MMOL/L (ref 21–32)
CREAT SERPL-MCNC: 1.13 MG/DL (ref 0.8–1.5)
DIAGNOSIS, 93000: NORMAL
ERYTHROCYTE [DISTWIDTH] IN BLOOD BY AUTOMATED COUNT: 13.7 % (ref 11.9–14.6)
GLUCOSE BLD STRIP.AUTO-MCNC: 131 MG/DL (ref 65–100)
GLUCOSE SERPL-MCNC: 144 MG/DL (ref 65–100)
HCT VFR BLD AUTO: 28.1 % (ref 41.1–50.3)
HGB BLD-MCNC: 9.2 G/DL (ref 13.6–17.2)
INR PPP: 1.1 (ref 0.9–1.2)
MCH RBC QN AUTO: 31.5 PG (ref 26.1–32.9)
MCHC RBC AUTO-ENTMCNC: 32.7 G/DL (ref 31.4–35)
MCV RBC AUTO: 96.2 FL (ref 79.6–97.8)
MM INDURATION POC: 0 MM (ref 0–5)
P-R INTERVAL, ECG05: 178 MS
PLATELET # BLD AUTO: 91 K/UL (ref 150–450)
PMV BLD AUTO: 10.3 FL (ref 10.8–14.1)
POTASSIUM SERPL-SCNC: 4.4 MMOL/L (ref 3.5–5.1)
PPD POC: 0 NEGATIVE
PROTHROMBIN TIME: 12 SEC (ref 9.6–12)
Q-T INTERVAL, ECG07: 446 MS
QRS DURATION, ECG06: 92 MS
QTC CALCULATION (BEZET), ECG08: 441 MS
RBC # BLD AUTO: 2.92 M/UL (ref 4.23–5.67)
SODIUM SERPL-SCNC: 142 MMOL/L (ref 136–145)
VENTRICULAR RATE, ECG03: 59 BPM
WBC # BLD AUTO: 7.3 K/UL (ref 4.3–11.1)

## 2017-07-07 PROCEDURE — 65660000000 HC RM CCU STEPDOWN

## 2017-07-07 PROCEDURE — 97530 THERAPEUTIC ACTIVITIES: CPT

## 2017-07-07 PROCEDURE — 80048 BASIC METABOLIC PNL TOTAL CA: CPT | Performed by: INTERNAL MEDICINE

## 2017-07-07 PROCEDURE — 74011250637 HC RX REV CODE- 250/637: Performed by: ORTHOPAEDIC SURGERY

## 2017-07-07 PROCEDURE — 85027 COMPLETE CBC AUTOMATED: CPT | Performed by: INTERNAL MEDICINE

## 2017-07-07 PROCEDURE — 97162 PT EVAL MOD COMPLEX 30 MIN: CPT

## 2017-07-07 PROCEDURE — 74011250636 HC RX REV CODE- 250/636: Performed by: ORTHOPAEDIC SURGERY

## 2017-07-07 PROCEDURE — 82962 GLUCOSE BLOOD TEST: CPT

## 2017-07-07 PROCEDURE — 97165 OT EVAL LOW COMPLEX 30 MIN: CPT

## 2017-07-07 PROCEDURE — 36415 COLL VENOUS BLD VENIPUNCTURE: CPT | Performed by: INTERNAL MEDICINE

## 2017-07-07 PROCEDURE — 93005 ELECTROCARDIOGRAM TRACING: CPT | Performed by: INTERNAL MEDICINE

## 2017-07-07 PROCEDURE — 85610 PROTHROMBIN TIME: CPT | Performed by: INTERNAL MEDICINE

## 2017-07-07 RX ADMIN — Medication 10 ML: at 22:14

## 2017-07-07 RX ADMIN — TRAMADOL HYDROCHLORIDE 50 MG: 50 TABLET, FILM COATED ORAL at 08:26

## 2017-07-07 RX ADMIN — ACETAMINOPHEN 650 MG: 325 TABLET ORAL at 17:25

## 2017-07-07 RX ADMIN — Medication 5 ML: at 08:27

## 2017-07-07 RX ADMIN — Medication 5 ML: at 06:34

## 2017-07-07 RX ADMIN — OXYCODONE HYDROCHLORIDE 10 MG: 5 TABLET ORAL at 10:25

## 2017-07-07 RX ADMIN — CEFAZOLIN 2 G: 1 INJECTION, POWDER, FOR SOLUTION INTRAMUSCULAR; INTRAVENOUS; PARENTERAL at 06:34

## 2017-07-07 RX ADMIN — ACETAMINOPHEN 650 MG: 325 TABLET ORAL at 22:14

## 2017-07-07 RX ADMIN — DOCUSATE SODIUM 100 MG: 100 CAPSULE, LIQUID FILLED ORAL at 17:25

## 2017-07-07 RX ADMIN — OXYCODONE HYDROCHLORIDE 5 MG: 5 TABLET ORAL at 06:37

## 2017-07-07 RX ADMIN — ATORVASTATIN CALCIUM 40 MG: 40 TABLET, FILM COATED ORAL at 22:14

## 2017-07-07 RX ADMIN — FAMOTIDINE 20 MG: 20 TABLET ORAL at 22:14

## 2017-07-07 RX ADMIN — LEFLUNOMIDE 20 MG: 20 TABLET ORAL at 22:14

## 2017-07-07 RX ADMIN — DOCUSATE SODIUM 100 MG: 100 CAPSULE, LIQUID FILLED ORAL at 08:23

## 2017-07-07 RX ADMIN — ACETAMINOPHEN 650 MG: 325 TABLET ORAL at 10:25

## 2017-07-07 RX ADMIN — FAMOTIDINE 20 MG: 20 TABLET ORAL at 08:23

## 2017-07-07 RX ADMIN — ACETAMINOPHEN 650 MG: 325 TABLET ORAL at 03:14

## 2017-07-07 RX ADMIN — LEVOTHYROXINE SODIUM 88 MCG: 88 TABLET ORAL at 22:14

## 2017-07-07 NOTE — PROGRESS NOTES
Problem: Mobility Impaired (Adult and Pediatric)  Goal: *Acute Goals and Plan of Care (Insert Text)  STG:  (1.)Mr. Dillon Finley will move from supine to sit and sit to supine , scoot up and down and roll side to side with STAND BY ASSIST within 3 day(s). (2.)Mr. Dillon Finley will transfer from bed to chair and chair to bed with CONTACT GUARD ASSIST using the least restrictive device within 3 day(s). (3.)Mr. Dillon Finley will ambulate with CONTACT GUARD ASSIST for 50 feet with the least restrictive device within 3 day(s). (4.)Mr. Dillon Finley will maintain spinal precautions throughout all functional mobility within 3 days with 0 verbal cues. (5.)Mr. Dillon Finley will perform standing static and dynamic balance activities x 10 minutes with CONTACT GUARD ASSIST to improve safety within 3 day(s). LTG:  (1.)Mr. Dillon Finley will move from supine to sit and sit to supine , scoot up and down and roll side to side in bed with SUPERVISION within 7 day(s). (2.)Mr. Dillon Finley will transfer from bed to chair and chair to bed with SUPERVISION using the least restrictive device within 7 day(s). (3.)Mr. Dillon Finley will ambulate with SUPERVISION for 100 feet with the least restrictive device within 7 day(s).   ________________________________________________________________________________________________      PHYSICAL THERAPY: INITIAL ASSESSMENT, AM 7/7/2017  INPATIENT: Hospital Day: 2  [place] / [place] / Product Type: Medicare /      NAME/AGE/GENDER: Cristina Hughes is a 67 y.o. male     PRIMARY DIAGNOSIS: Spondylolisthesis of lumbar region [M43.16]  Lumbar spinal stenosis [M48.06] <principal problem not specified> <principal problem not specified>  Procedure(s) (LRB):  L2-L3 LAMINECTOMY AND FUSION WITH BONE MARROW ASPIRATE, ALLOGRAFT, INSTRUMENTATION L2-L5 WITH TLIF  (N/A)  1 Day Post-Op  ICD-10: Treatment Diagnosis:       · Difficulty in walking, Not elsewhere classified (R26.2)   Precaution/Allergies:  Citric acid and Lisinopril       ASSESSMENT:      Mr. Brandy Johnson is a 67 y.o. male s/p above surgery. He lives with spouse and typically ambulates with straight cane, however admits to more frequent falling recently. He is supine on contact and agreeable to therapy. Required cues to recall spinal precautions. Performed log roll with CGA and then c/o lightheadedness initially upon sitting, however passed with rest. He stood with minimal assist and cues for sequencing prior to standing to walker and then ambulated around room, requiring cues to keep walker legs on the ground during mobility. He sat up in chair and again c/o lightheadedness. Remained in room for additional 10 minutes to monitor patient while sitting and he reported he was no longer lightheaded. Verbal understanding to call for assistance to return to bed. Janet Whitaker is currently functioning below his baseline and would benefit from skilled PT during acute care stay to maximize safety and independence with functional mobility. This section established at most recent assessment   PROBLEM LIST (Impairments causing functional limitations):  1. Decreased Strength  2. Decreased ADL/Functional Activities  3. Decreased Transfer Abilities  4. Decreased Ambulation Ability/Technique  5. Decreased Balance  6. Increased Pain  7. Decreased Activity Tolerance  8. Decreased Knowledge of Precautions  9. Decreased [place] with Home Exercise Program  10. Decreased Cognition    INTERVENTIONS PLANNED: (Benefits and precautions of physical therapy have been discussed with the patient.)  1. Balance Exercise  2. Bed Mobility  3. Family Education  4. Gait Training  5. Home Exercise Program (HEP)  6. Therapeutic Activites  7. Therapeutic Exercise/Strengthening  8. Transfer Training  9. patient education  10.  Group Therapy      TREATMENT PLAN: Frequency/Duration: twice daily for duration of hospital stay  Rehabilitation Potential For Stated Goals: [id number] [address] REHABILITATION/EQUIPMENT: (at time of discharge pending progress): Continue Skilled Therapy. HISTORY:   History of Present Injury/Illness (Reason for Referral):  Per MD Note: \"The patient returns today with persistent symptoms of lumbar stenosis resulting in neurogenic claudication and functional deficit as previously described. The symptoms are predominately in the low back and bilateral posterior thighs and legs. The symptoms have been present for 1 year. The symptoms are described as a deep ache and cramping sensation in the backs and legs limiting his ability to walk and stand for more than a couple of minutes. The symptoms are worse with activities. There has been no lasting benefit from conservative efforts including a lumbar orthosis, activity modification, oral medications, and multiple epidural steroid injections. At this point he is ready to proceed with surgical intervention. \"  Past Medical History/Comorbidities:   Mr. Azra Dominique  has a past medical history of Aneurysm ([facility] [address] ASCAD - of the native vessel (6/13/2016); CAD (coronary artery disease); Cancer ([address] Chronic pain; Coagulation disorder ([facility] [address] Crohn's colitis (Dr Zeenat Curtis) (8/1/2012); crohns's disease; Disorder of bone and cartilage, unspecified (8/11/2014); Dyslipidemia (6/13/2016); GERD (gastroesophageal reflux disease); History of skin cancer; Hypercholesterolemia; Hypertension; MI (myocardial infarction) ([address] Obesity; Other specified inflammatory polyarthropathies (1/9/2013); PVC (premature ventricular contraction); PVC (premature ventricular contraction); RA (rheumatoid arthritis) ([address] Status Post PTCA/stent (6/13/2016); Thrombocytopenia, unspecified ([address] (4/10/2014); Thyroid disease; Trochanteric bursitis (5/1/2013); and Unspecified sleep apnea. He also has no past medical history of Adverse effect of anesthesia; Difficult intubation; Malignant hyperthermia due to anesthesia;  Nausea & vomiting; or Pseudocholinesterase deficiency. Mr. Batsheva Mustafa  has a past surgical history that includes lumbar fusion (5913-9016); colonoscopy (Dec 2003); heart catheterization (2001 and 2003); left heart cath,percutaneous (2014); cataract removal; tonsillectomy; sinus surgery proc unlisted; heent; orthopaedic; and hernia repair. Social History/Living Environment:   Home Environment: Private residence  # Steps to Enter: 1  One/Two Story Residence: One story  Living Alone: No  Support Systems: Spouse/Significant Other/Partner  Patient Expects to be Discharged to[de-identified] Private residence  Current DME Used/Available at Home: [address] [facility] [place], straight, Walker, rollator, Walker, rolling  Prior Level of Function/Work/Activity:  Patient ambulated with straight cane prior to admission. Number of Personal Factors/Comorbidities that affect the Plan of Care: 1-2: MODERATE COMPLEXITY   EXAMINATION:   Most Recent Physical Functioning:   Gross Assessment:  Strength: Generally decreased, functional  Coordination: Generally decreased, functional  Tone: Normal  Sensation: Impaired               Posture:  Posture (WDL): Exceptions to WDL  Posture Assessment: Forward head, Rounded shoulders  Balance:  Sitting: Impaired  Sitting - Static: Good (unsupported)  Sitting - Dynamic: Fair (occasional)  Standing: Impaired  Standing - Static: Fair  Standing - Dynamic : Fair Bed Mobility:  Rolling: Contact guard assistance  Supine to Sit: Contact guard assistance  Scooting: Contact guard assistance  Wheelchair Mobility:     Transfers:  Sit to Stand: Minimum assistance  Stand to Sit: Minimum assistance  Bed to Chair: Minimum assistance  Gait:     Base of Support: Center of gravity altered;Narrowed  Speed/[place]: Slow;Pace decreased (<100 feet/min); Shuffled  Step Length: Right shortened;Left shortened  Gait Abnormalities: Decreased step clearance;Trunk sway increased  Distance (ft): 15 Feet (ft)  Assistive Device: Walker, rolling  Ambulation - Level of Assistance: Minimal assistance;Contact guard assistance  Interventions: Safety awareness training;Verbal cues; Visual/Demos       Body Structures Involved:  1. Nerves  2. Bones  3. Joints  4. Muscles  5. Ligaments Body Functions Affected:  1. Sensory/Pain  2. Neuromusculoskeletal  3. Movement Related Activities and Participation Affected:  1. Mobility  2. Self Care  3. Domestic Life  4. Interpersonal Interactions and Relationships  5. Community, Social and [place]   Number of elements that affect the Plan of Care: 4+: HIGH COMPLEXITY   CLINICAL PRESENTATION:   Presentation: Evolving clinical presentation with changing clinical characteristics: MODERATE COMPLEXITY   CLINICAL DECISION MAKIN [facility] Mobility Inpatient Short Form  How much difficulty does the patient currently have. .. Unable A Lot A Little None   1. Turning over in bed (including adjusting bedclothes, sheets and blankets)? [ ] 1   [ ] 2   [X] 3   [ ] 4   2. Sitting down on and standing up from a chair with arms ( e.g., wheelchair, bedside commode, etc.)   [ ] 1   [ ] 2   [X] 3   [ ] 4   3. Moving from lying on back to sitting on the side of the bed? [ ] 1   [ ] 2   [X] 3   [ ] 4   How much help from another person does the patient currently need. .. Total A Lot A Little None   4. Moving to and from a bed to a chair (including a wheelchair)? [ ] 1   [ ] 2   [X] 3   [ ] 4   5. Need to walk in hospital room? [ ] 1   [X] 2   [ ] 3   [ ] 4   6. Climbing 3-5 steps with a railing? [ ] 1   [X] 2   [ ] 3   [ ] 4   © , Trustees of [address] [place] [address] [address] [address], under license to [employer]. All rights reserved    Score:  Initial: 16 Most Recent: X (Date: -- )     Interpretation of Tool:  Represents activities that are increasingly more difficult (i.e. Bed mobility, Transfers, Gait).        Score 24 23 22-20 19-15 14-10 9-7 6       Modifier CH CI CJ CK CL CM CN         · Mobility - Walking and Moving Around:               - CURRENT STATUS:    CK - 40%-59% impaired, limited or restricted               - GOAL STATUS:           CJ - 20%-39% impaired, limited or restricted               - D/C STATUS:                       ---------------To be determined---------------  [place] / [place] / Product Type: Medicare /       Medical Necessity:     · Patient demonstrates good rehab potential due to higher previous functional level. Reason for Services/Other Comments:  · Patient continues to require modification of therapeutic interventions to increase complexity of exercises. Use of outcome tool(s) and clinical judgement create a POC that gives a: Questionable prediction of patient's progress: MODERATE COMPLEXITY                 TREATMENT:   (In addition to Assessment/Re-Assessment sessions the following treatments were rendered)   Pre-treatment Symptoms/Complaints:  C/o pain, already received pain medicine. Pain: Initial:   Pain Intensity 1: 5  Post Session:  5/10      Assessment/Reassessment only, no treatment provided today     Braces/Orthotics/Lines/Etc:   · IV  · O2 Device: Room air  Treatment/Session Assessment:    · Response to Treatment:  Patient tolerated treatment with minimal increase in lightheadedness, however improved with rest.  · Interdisciplinary Collaboration:  · Physical Therapist  · Registered Nurse  · After treatment position/precautions:  · Up in chair  · Bed/Chair-wheels locked  · Bed in low position  · Call light within reach  · RN notified  · Family at bedside  · Compliance with Program/Exercises: Will assess as treatment progresses. · Recommendations/Intent for next treatment session: \"Next visit will focus on advancements to more challenging activities and reduction in assistance provided\".   Total Treatment Duration:  PT Patient Time In/Time Out  Time In: 0940  Time Out: [address], DPT

## 2017-07-07 NOTE — PROGRESS NOTES
Responded to Rapid Response. Support initially given to wife of patient who was present when patient \"passed out\". Listened as she shared her emotions. Continued support given to patient and wife. Will continue to follow as needed. Bianka Rincon M.Div.

## 2017-07-07 NOTE — PROGRESS NOTES
Problem: Mobility Impaired (Adult and Pediatric)  Goal: *Acute Goals and Plan of Care (Insert Text)  STG:  (1.)Mr. Ambreen Graff will move from supine to sit and sit to supine , scoot up and down and roll side to side with STAND BY ASSIST within 3 day(s). (2.)Mr. Ambreen Graff will transfer from bed to chair and chair to bed with CONTACT GUARD ASSIST using the least restrictive device within 3 day(s). (3.)Mr. Ambreen Graff will ambulate with CONTACT GUARD ASSIST for 50 feet with the least restrictive device within 3 day(s). (4.)Mr. Ambreen Graff will maintain spinal precautions throughout all functional mobility within 3 days with 0 verbal cues. (5.)Mr. Ambreen Graff will perform standing static and dynamic balance activities x 10 minutes with CONTACT GUARD ASSIST to improve safety within 3 day(s). LTG:  (1.)Mr. Ambreen Graff will move from supine to sit and sit to supine , scoot up and down and roll side to side in bed with SUPERVISION within 7 day(s). (2.)Mr. Ambreen Graff will transfer from bed to chair and chair to bed with SUPERVISION using the least restrictive device within 7 day(s). (3.)Mr. Ambreen Graff will ambulate with SUPERVISION for 100 feet with the least restrictive device within 7 day(s).   ________________________________________________________________________________________________      PHYSICAL THERAPY: Daily Note, Treatment Day: Day of Assessment and PM 7/7/2017  INPATIENT: Hospital Day: 2  [place] / [place] / Product Type: Medicare /      NAME/AGE/GENDER: Sania Chase is a 67 y.o. male     PRIMARY DIAGNOSIS: Spondylolisthesis of lumbar region [M43.16]  Lumbar spinal stenosis [M48.06] <principal problem not specified> <principal problem not specified>  Procedure(s) (LRB):  L2-L3 LAMINECTOMY AND FUSION WITH BONE MARROW ASPIRATE, ALLOGRAFT, INSTRUMENTATION L2-L5 WITH TLIF  (N/A)  1 Day Post-Op  ICD-10: Treatment Diagnosis:       · Difficulty in walking, Not elsewhere classified (R26.2) Precaution/Allergies:  Citric acid and Lisinopril       ASSESSMENT:      Mr. Drew Lee is a 67 y.o. male s/p above surgery. He lives with spouse and typically ambulates with straight cane, however admits to more frequent falling recently. He is supine on contact and agreeable to therapy. Required cues to recall spinal precautions. Performed log roll with CGA and then c/o lightheadedness initially upon sitting, however passed with rest. He stood with minimal assist and cues for sequencing prior to standing to walker and then ambulated around room, requiring cues to keep walker legs on the ground during mobility. He sat up in chair and again c/o lightheadedness. Remained in room for additional 10 minutes to monitor patient while sitting and he reported he was no longer lightheaded. Verbal understanding to call for assistance to return to bed. Abby Snyder is currently functioning below his baseline and would benefit from skilled PT during acute care stay to maximize safety and independence with functional mobility. PM Note: Patient supine in bed and agreeable to therapy. Seems a little disoriented this PM and noted to have decreased safety awareness throughout mobility. Reports he needs to use the restroom and transferred to sitting with SBA. BP WNL supine and in sitting. Minimal assist to stand and ambulate into restroom and would not use rolling walker. He argued with this therapist about being left alone in restroom to stand to urinate. Educated him that it would be unsafe to attempt this as patient had syncopal episode this AM. He eventually was agreeable to sit to urinate. Attempted to ambulate without walker out of restroom and was encouraged to do so. He was agreeable after education on safety precautions. Ambulated in hallway 76' with 2 losses of balance when patient turned his head, requiring minimal assistance to maintain his balance. Chair follow for safety.  At the end of the session, OT presented to perform evaluation and patient was left with OT attending. He exhibits decreased safety awareness and poor insight to his deficits. Will continue therapy efforts. This section established at most recent assessment   PROBLEM LIST (Impairments causing functional limitations):  1. Decreased Strength  2. Decreased ADL/Functional Activities  3. Decreased Transfer Abilities  4. Decreased Ambulation Ability/Technique  5. Decreased Balance  6. Increased Pain  7. Decreased Activity Tolerance  8. Decreased Knowledge of Precautions  9. Decreased [place] with Home Exercise Program  10. Decreased Cognition    INTERVENTIONS PLANNED: (Benefits and precautions of physical therapy have been discussed with the patient.)  1. Balance Exercise  2. Bed Mobility  3. Family Education  4. Gait Training  5. Home Exercise Program (HEP)  6. Therapeutic Activites  7. Therapeutic Exercise/Strengthening  8. Transfer Training  9. patient education  10. Group Therapy      TREATMENT PLAN: Frequency/Duration: twice daily for duration of hospital stay  Rehabilitation Potential For Stated Goals: GOOD      RECOMMENDED REHABILITATION/EQUIPMENT: (at time of discharge pending progress): Continue Skilled Therapy. HISTORY:   History of Present Injury/Illness (Reason for Referral):  Per MD Note: \"The patient returns today with persistent symptoms of lumbar stenosis resulting in neurogenic claudication and functional deficit as previously described. The symptoms are predominately in the low back and bilateral posterior thighs and legs. The symptoms have been present for 1 year. The symptoms are described as a deep ache and cramping sensation in the backs and legs limiting his ability to walk and stand for more than a couple of minutes. The symptoms are worse with activities.  There has been no lasting benefit from conservative efforts including a lumbar orthosis, activity modification, oral medications, and multiple epidural steroid injections. At this point he is ready to proceed with surgical intervention. \"  Past Medical History/Comorbidities:   Mr. Michael Arnold  has a past medical history of Aneurysm ([facility] [address] ASCAD - of the native vessel (6/13/2016); CAD (coronary artery disease); Cancer ([facility] [address] Chronic pain; Coagulation disorder ([facility] [address] Crohn's colitis (Dr Nancy Stark) (8/1/2012); crohns's disease; Disorder of bone and cartilage, unspecified (8/11/2014); Dyslipidemia (6/13/2016); GERD (gastroesophageal reflux disease); History of skin cancer; Hypercholesterolemia; Hypertension; MI (myocardial infarction) ([facility] [address] Obesity; Other specified inflammatory polyarthropathies (1/9/2013); PVC (premature ventricular contraction); PVC (premature ventricular contraction); RA (rheumatoid arthritis) ([facility] [address] Status Post PTCA/stent (6/13/2016); Thrombocytopenia, unspecified ([facility] [address] (4/10/2014); Thyroid disease; Trochanteric bursitis (5/1/2013); and Unspecified sleep apnea. He also has no past medical history of Adverse effect of anesthesia; Difficult intubation; Malignant hyperthermia due to anesthesia; Nausea & vomiting; or Pseudocholinesterase deficiency. Mr. Michael Arnold  has a past surgical history that includes lumbar fusion ([id number]); colonoscopy (Dec 2003); heart catheterization (2001 and 2003); left heart cath,percutaneous (2014); cataract removal; tonsillectomy; sinus surgery proc unlisted; heent; orthopaedic; and hernia repair. Social History/Living Environment:   Home Environment: Private residence  # Steps to Enter: 1  One/Two Story Residence: One story  Living Alone: No  Support Systems: Spouse/Significant Other/Partner  Patient Expects to be Discharged to[de-identified] Private residence  Current DME Used/Available at Home: [employer], straight, Walker, rollator, Walker, rolling  Prior Level of Function/Work/Activity:  Patient ambulated with straight cane prior to admission.       Number of Personal Factors/Comorbidities that affect the Plan of Care: 1-2: MODERATE COMPLEXITY EXAMINATION:   Most Recent Physical Functioning:   Gross Assessment:  Strength: Generally decreased, functional  Coordination: Generally decreased, functional  Tone: Normal  Sensation: Impaired               Posture:  Posture (WDL): Exceptions to WDL  Posture Assessment: Forward head, Rounded shoulders  Balance:  Sitting: Impaired  Sitting - Static: Fair (occasional)  Sitting - Dynamic: Fair (occasional)  Standing: Impaired  Standing - Static: Fair  Standing - Dynamic : Fair (-) Bed Mobility:  Rolling: Stand-by asssistance  Supine to Sit: Stand-by asssistance  Scooting: Stand-by asssistance  Interventions: Safety awareness training;Verbal cues  Wheelchair Mobility:     Transfers:  Sit to Stand: Minimum assistance  Stand to Sit: Minimum assistance  Bed to Chair: Minimum assistance  Interventions: Safety awareness training;Verbal cues  Gait:     Base of Support: Narrowed; Center of gravity altered  Speed/[place]: Shuffled;Pace decreased (<100 feet/min); Slow  Step Length: Right shortened;Left shortened  Gait Abnormalities: Decreased step clearance;Trunk sway increased  Distance (ft): 75 Feet (ft)  Assistive Device: Walker, rolling  Ambulation - Level of Assistance: Contact guard assistance;Minimal assistance  Interventions: Safety awareness training;Verbal cues; Visual/Demos       Body Structures Involved:  1. Nerves  2. Bones  3. Joints  4. Muscles  5. Ligaments Body Functions Affected:  1. Sensory/Pain  2. Neuromusculoskeletal  3. Movement Related Activities and Participation Affected:  1. Mobility  2. Self Care  3. Domestic Life  4. Interpersonal Interactions and Relationships  5.  Community, Social and [place]   Number of elements that affect the Plan of Care: 4+: HIGH COMPLEXITY   CLINICAL PRESENTATION:   Presentation: Evolving clinical presentation with changing clinical characteristics: MODERATE COMPLEXITY   CLINICAL DECISION MAKIN [facility] Form  How much difficulty does the patient currently have. .. Unable A Lot A Little None   1. Turning over in bed (including adjusting bedclothes, sheets and blankets)? [ ] 1   [ ] 2   [X] 3   [ ] 4   2. Sitting down on and standing up from a chair with arms ( e.g., wheelchair, bedside commode, etc.)   [ ] 1   [ ] 2   [X] 3   [ ] 4   3. Moving from lying on back to sitting on the side of the bed? [ ] 1   [ ] 2   [X] 3   [ ] 4   How much help from another person does the patient currently need. .. Total A Lot A Little None   4. Moving to and from a bed to a chair (including a wheelchair)? [ ] 1   [ ] 2   [X] 3   [ ] 4   5. Need to walk in hospital room? [ ] 1   [X] 2   [ ] 3   [ ] 4   6. Climbing 3-5 steps with a railing? [ ] 1   [X] 2   [ ] 3   [ ] 4   © 2007, Trustees of [facility] [employer], under license to [employer]. All rights reserved    Score:  Initial: 16 Most Recent: X (Date: -- )     Interpretation of Tool:  Represents activities that are increasingly more difficult (i.e. Bed mobility, Transfers, Gait). Score 24 23 22-20 19-15 14-10 9-7 6       Modifier CH CI CJ CK CL CM CN         · Mobility - Walking and Moving Around:               - CURRENT STATUS:    CK - 40%-59% impaired, limited or restricted               - GOAL STATUS:           CJ - 20%-39% impaired, limited or restricted               - D/C STATUS:                       ---------------To be determined---------------  [place] / [place] / Product Type: Medicare /       Medical Necessity:     · Patient demonstrates good rehab potential due to higher previous functional level. Reason for Services/Other Comments:  · Patient continues to require modification of therapeutic interventions to increase complexity of exercises.    Use of outcome tool(s) and clinical judgement create a POC that gives a: Questionable prediction of patient's progress: MODERATE COMPLEXITY TREATMENT:   (In addition to Assessment/Re-Assessment sessions the following treatments were rendered)   Pre-treatment Symptoms/Complaints:  C/o pain, already received pain medicine. Pain: Initial:   Pain Intensity 1: 3  Post Session:  5/10      Therapeutic Activity: (    12 minutes): Therapeutic activities including Bed transfers, Chair transfers, Ambulation on level ground and sit to stand from various surface heights to improve mobility, strength and balance. Required minimal Safety awareness training;Verbal cues; Visual/Demos to promote static and dynamic balance in standing. Braces/Orthotics/Lines/Etc:   · IV  · O2 Device: Room air  Treatment/Session Assessment:    · Response to Treatment:  Patient tolerated treatment with minimal increase in lightheadedness, however improved with rest.  · Interdisciplinary Collaboration:  · Physical Therapist  · Registered Nurse  · After treatment position/precautions:  · Bed/Chair-wheels locked, Bed in low position, Call light within reach, RN notified, Family at bedside and with OT  · Compliance with Program/Exercises: Will assess as treatment progresses. · Recommendations/Intent for next treatment session: \"Next visit will focus on advancements to more challenging activities and reduction in assistance provided\".   Total Treatment Duration:PT Patient Time In/Time Out  Time In: 2112  Time Out: VITO Gayle

## 2017-07-07 NOTE — PROGRESS NOTES
NS 50cc bolus per verbal order Dr Hosea Silva  EKG SB 59  /78  Awake ,alert but still  \"too weak\" to get up  Waiting for lab to draw STAT labs .   Wife @ bedside

## 2017-07-07 NOTE — PROGRESS NOTES
Up to chair by PT  C/o \"I'm gonna pass out\". Eyes rolled back/ very pale clammy/   Picked up patient and eased on to the bed  /60 P59  Placed on O2 2l Sat 93%  Called for Rapid Response team  Stable at this time but look pale white/clammy/ hardly keeping eyes open.

## 2017-07-07 NOTE — PROGRESS NOTES
ORTHO PROGRESS NOTE    2017    Admit Date: 2017  Admit Diagnosis: Spondylolisthesis of lumbar region [M43.16]  Lumbar spinal stenosis [M48.06]  Post Op day: 1 Day Post-Op      Subjective:     Joya Miller is a patient who is now 1 Day Post-Op  and has complaints  of back pain. Sitting up in bed eating breakfast. .       Objective:     PT/OT: to progress today        Vital Signs:    Patient Vitals for the past 8 hrs:   BP Temp Pulse Resp SpO2   17 0721 126/60 98.5 °F (36.9 °C) 67 16 94 %   17 0345 124/68 97.7 °F (36.5 °C) 70 16 93 %   17 0001 124/80 97.5 °F (36.4 °C) 80 15 90 %     Temp (24hrs), Av °F (36.7 °C), Min:97.5 °F (36.4 °C), Max:98.8 °F (37.1 °C)      LAB:    No results for input(s): HGB, WBC, PLT, HGBEXT, PLTEXT in the last 72 hours. I/O:      1901 -  0700  In: 2200 [I.V.:2200]  Out: 1865 [Urine:520; Drains:695]    Physical Exam:    Awake and in no acute distress. Mood and affect appropriate. Respirations unlabored and no evidence cyanosis. Calves nontender. Abdomen soft and nontender. Dressing clean/dry  No new neurologic deficit. Assessment:      Patient Active Problem List   Diagnosis Code    Lumbar stenosis with neurogenic claudication M48.06    Rheumatoid arthritis ([address] M06.9    Hypothyroidism E03.9    Mixed hyperlipidemia E78.2    Chronic idiopathic thrombocytopenia (HCC) D69.3    AAA (abdominal aortic aneurysm) (HCC) I71.4    Dyspepsia, controlled by Zantac R10.13    Ophthalmic migraine G43. 109    Anemia, chronic disease D63.8    HARI (obstructive sleep apnea) G47.33    Pleural plaque with presence of asbestos J92.0    Left knee DJD M17.12    Chronic low back pain M54.5, G89.29    Adjustment disorder F43.20    Essential hypertension, benign I10    ASCAD - of the native vessel I25.10    Status Post PTCA/stent Z98.61    PVC (premature ventricular contraction) I49.3       1 Day Post-Op STATUS POST Procedure(s):  L2-L3 LAMINECTOMY AND FUSION WITH BONE MARROW ASPIRATE, ALLOGRAFT, INSTRUMENTATION L2-L5 WITH TLIF       Plan:     Continue PT/OT/Rehab  Discontinue: pelaez  Consult: none  Anticipate discharge to: HOME vs rehab, pending progress       Signed By: Ethan Evangelista NP

## 2017-07-07 NOTE — PROGRESS NOTES
Patient had rapid response called this morning. Will hold OT evaluation and check back as schedule permits.    Maddiejaqueline Shen, OTR/L

## 2017-07-07 NOTE — PROGRESS NOTES
Problem: Self Care Deficits Care Plan (Adult)  Goal: *Acute Goals and Plan of Care (Insert Text)  1. Patient will verbalize and demonstrate understanding of spinal precautions with 100% accuracy during ADLs. 2. Patient will complete lower body bathing and dressing with setup and adaptive equipment as needed. 3. Patient will complete functional transfers with supervision and adaptive equipment as needed. 4. Patient will complete toileting and toilet transfer with supervision. 5. Patient will complete functional mobility of household distances with supervision and adaptive equipment as needed. 6. Patient will demonstrate ability to log roll in bed with modified independnece and no verbal cues from therapist.   7. Patient will demonstrate improved safety awareness as evidenced by no verbal cues for safety from therapist for entire treatment session. Timeframe: 7 visits       OCCUPATIONAL THERAPY: Initial Assessment 7/7/2017  INPATIENT: Hospital Day: 2  [place] / Product Type: Medicare /      NAME/AGE/GENDER: Rebecca Phan is a 67 y.o. male     PRIMARY DIAGNOSIS:  Spondylolisthesis of lumbar region [M43.16]  Lumbar spinal stenosis [M48.06] <principal problem not specified> <principal problem not specified>  Procedure(s) (LRB):  L2-L3 LAMINECTOMY AND FUSION WITH BONE MARROW ASPIRATE, ALLOGRAFT, INSTRUMENTATION L2-L5 WITH TLIF  (N/A)  1 Day Post-Op  ICD-10: Treatment Diagnosis:        · Low Back Pain (M54.5)   Precautions/Allergies:        spinal precautions    Citric acid and Lisinopril       ASSESSMENT:      Mr. Lorrie Gonzalez is a 67year old male who is now s/p above procedure. At baseline he lives with his wife and is independent with ADLs. He gets assistance with IADLs such as medication management. Patient agreeable to OT evaluation. Reports pain 3/10 in back. BP checked 120/68 and reports no dizziness. Patient verbalized 2/3 spinal precautions.  Educated patient on spinal precautions during ADL and log rolling technique. Able to complete log roll with CGA. BUE assessment reveals BUE ROM and strength are WFL. Demonstrates impaired balance in standing with impulsivity/ decreased safety awareness noted. He would benefit from continued occupational therapy to increase independence and safety with ADLs. Will follow. This section established at most recent assessment   PROBLEM LIST (Impairments causing functional limitations):  1. Decreased Strength  2. Decreased ADL/Functional Activities  3. Decreased Transfer Abilities  4. Decreased Ambulation Ability/Technique  5. Decreased Balance  6. Increased Pain  7. Decreased Activity Tolerance  8. Decreased Flexibility/Joint Mobility  9. Decreased Knowledge of Precautions  10. Decreased Cognition    INTERVENTIONS PLANNED: (Benefits and precautions of occupational therapy have been discussed with the patient.)  1. Activities of daily living training  2. Adaptive equipment training  3. Group therapy  4. Therapeutic activity  5. Therapeutic exercise      TREATMENT PLAN: Frequency/Duration: Follow patient 3x/ week to address above goals. Rehabilitation Potential For Stated Goals: GOOD      RECOMMENDED REHABILITATION/EQUIPMENT: (at time of discharge pending progress): Continue Skilled Therapy. OCCUPATIONAL PROFILE AND HISTORY:   History of Present Injury/Illness (Reason for Referral):  S/p above procedure   Past Medical History/Comorbidities:   Mr. Jaron Howell  has a past medical history of Aneurysm ([address] ASCAD - of the native vessel (6/13/2016); CAD (coronary artery disease); Cancer ([address] Chronic pain; Coagulation disorder ([address] Crohn's colitis (Dr Arbutus Sicard) (8/1/2012); crohns's disease; Disorder of bone and cartilage, unspecified (8/11/2014); Dyslipidemia (6/13/2016); GERD (gastroesophageal reflux disease); History of skin cancer; Hypercholesterolemia; Hypertension; MI (myocardial infarction) ([address] Obesity;  Other specified inflammatory polyarthropathies (1/9/2013); PVC (premature ventricular contraction); PVC (premature ventricular contraction); RA (rheumatoid arthritis) ([facility] Status Post PTCA/stent (6/13/2016); Thrombocytopenia, unspecified ([facility] (4/10/2014); Thyroid disease; Trochanteric bursitis (5/1/2013); and Unspecified sleep apnea. He also has no past medical history of Adverse effect of anesthesia; Difficult intubation; Malignant hyperthermia due to anesthesia; Nausea & vomiting; or Pseudocholinesterase deficiency. Mr. Iglesia Fallon  has a past surgical history that includes lumbar fusion ([id number]); colonoscopy (Dec 2003); heart catheterization (2001 and 2003); left heart cath,percutaneous (2014); cataract removal; tonsillectomy; sinus surgery proc unlisted; heent; orthopaedic; and hernia repair. Social History/Living Environment:   Home Environment: Private residence  # Steps to Enter: 1  One/Two Story Residence: One story  Living Alone: No  Support Systems: Spouse/Significant Other/Partner  Patient Expects to be Discharged to[de-identified] Private residence  Current DME Used/Available at Home: [address] [facility] [place], straight, Walker, rollator, Walker, rolling  Prior Level of Function/Work/Activity:  Patient lives with wife. Independent with ADLs and IADLs except for medication management. Number of Personal Factors/Comorbidities that affect the Plan of Care: Brief history (0):  LOW COMPLEXITY   ASSESSMENT OF OCCUPATIONAL PERFORMANCE[de-identified]   Activities of Daily Living:           Basic ADLs (From Assessment) Complex ADLs (From Assessment)   Basic ADL  Feeding: Setup  Oral Facial Hygiene/Grooming: Setup  Bathing: Moderate assistance  Upper Body Dressing: Minimum assistance  Lower Body Dressing: Moderate assistance  Toileting:  Moderate assistance Instrumental ADL  Meal Preparation: Maximum assistance  Homemaking: Maximum assistance  Financial Management: Maximum assistance   Grooming/Bathing/Dressing Activities of Daily Living     Cognitive Retraining  Safety/Judgement: Decreased awareness of need for assistance;Decreased awareness of need for safety;Decreased insight into deficits; Fall prevention                       Bed/Mat Mobility  Rolling: Contact guard assistance  Supine to Sit: Contact guard assistance  Sit to Supine: Contact guard assistance  Sit to Stand: Minimum assistance  Bed to Chair: Minimum assistance  Scooting: Supervision          Most Recent Physical Functioning:   Gross Assessment:  AROM: Within functional limits (BUE)  Strength: Within functional limits (BUE)               Posture:  Posture (WDL): Exceptions to WDL  Posture Assessment: Forward head, Rounded shoulders  Balance:  Sitting: Impaired  Sitting - Static: Good (unsupported)  Sitting - Dynamic: Fair (occasional)  Standing: Impaired  Standing - Static: Fair  Standing - Dynamic : Fair (-) Bed Mobility:  Rolling: Contact guard assistance  Supine to Sit: Contact guard assistance  Sit to Supine: Contact guard assistance  Scooting: Supervision  Wheelchair Mobility:     Transfers:  Sit to Stand: Minimum assistance  Stand to Sit: Minimum assistance  Bed to Chair: Minimum assistance                    Patient Vitals for the past 6 hrs:       BP SpO2 Pulse   07/07/17 1039 120/67 96 % (!) 57   07/07/17 1119 117/78 - -   07/07/17 1430 120/68 - -        Mental Status  Neurologic State: Alert  Orientation Level: Oriented to person, Oriented to place, Oriented to situation  Cognition: Impulsive, Poor safety awareness  Perception: Appears intact  Perseveration: No perseveration noted  Safety/Judgement: Decreased awareness of need for assistance, Decreased awareness of need for safety, Decreased insight into deficits, Fall prevention                               Physical Skills Involved:  1. Range of Motion  2. Balance  3. Strength  4. Activity Tolerance  5. Pain (acute) Cognitive Skills Affected (resulting in the inability to perform in a timely and safe manner):  1.  Executive Function  2. Comprehension  3. Safety awareness Psychosocial Skills Affected:  1. Habits/Routines  2. Environmental Adaptation  3. Self-Awareness   Number of elements that affect the Plan of Care: 5+:  HIGH COMPLEXITY   CLINICAL DECISION MAKIN[address] [place] [address] AM-PAC 6 Clicks   Daily Activity Inpatient Short Form  How much help from another person does the patient currently need. .. Total A Lot A Little None   1. Putting on and taking off regular lower body clothing?   [ ] 1   [X] 2   [ ] 3   [ ] 4   2. Bathing (including washing, rinsing, drying)? [ ] 1   [X] 2   [ ] 3   [ ] 4   3. Toileting, which includes using toilet, bedpan or urinal?   [ ] 1   [X] 2   [ ] 3   [ ] 4   4. Putting on and taking off regular upper body clothing?   [ ] 1   [ ] 2   [X] 3   [ ] 4   5. Taking care of personal grooming such as brushing teeth? [ ] 1   [ ] 2   [X] 3   [ ] 4   6. Eating meals? [ ] 1   [ ] 2   [X] 3   [ ] 4   © , Trustees of [address] [place], [place] [address], under license to [employer]. All rights reserved    Score:  Initial: 15 Most Recent: X (Date: -- )     Interpretation of Tool:  Represents activities that are increasingly more difficult (i.e. Bed mobility, Transfers, Gait). Score 24 23 22-20 19-15 14-10 9-7 6       Modifier CH CI CJ CK CL CM CN         · Self Care:               - CURRENT STATUS:    CK - 40%-59% impaired, limited or restricted               - GOAL STATUS:           CJ - 20%-39% impaired, limited or restricted               - D/C STATUS:                       ---------------To be determined---------------  [place] / [place] / Product Type: Medicare /       Medical Necessity:     · Patient demonstrates good rehab potential due to higher previous functional level. Reason for Services/Other Comments:  · Patient continues to require present interventions due to patient's inability to care for self while maintaining spinal precautions.    Use of outcome tool(s) and clinical judgement create a POC that gives a: MODERATE COMPLEXITY             TREATMENT:   (In addition to Assessment/Re-Assessment sessions the following treatments were rendered)      Pre-treatment Symptoms/Complaints:  None   Pain: Initial:   Pain Intensity 1: 3  Pain Location 1: Back  Pain Intervention(s) 1: Repositioned  Post Session:  same      Assessment/Reassessment only, no treatment provided today     Braces/Orthotics/Lines/Etc:   · IV  · drain    · O2 Device: Room air  Treatment/Session Assessment:    · Response to Treatment:  Tolerated well + cues for safety  · Interdisciplinary Collaboration:  · Occupational Therapist  · Registered Nurse  · After treatment position/precautions:  · Supine in bed  · Bed/Chair-wheels locked  · Bed in low position  · Call light within reach  · RN notified  · Family at bedside  · Compliance with Program/Exercises: Will assess as treatment progresses. · Recommendations/Intent for next treatment session: \"Next visit will focus on advancements to more challenging activities and reduction in assistance provided\".   Total Treatment Duration:  OT Patient Time In/Time Out  Time In: 1430  Time Out: [address] BRAD Shen/L

## 2017-07-08 LAB
MM INDURATION POC: 0 MM (ref 0–5)
PPD POC: 0 NEGATIVE

## 2017-07-08 PROCEDURE — 97530 THERAPEUTIC ACTIVITIES: CPT

## 2017-07-08 PROCEDURE — 74011250637 HC RX REV CODE- 250/637: Performed by: ORTHOPAEDIC SURGERY

## 2017-07-08 PROCEDURE — 65660000000 HC RM CCU STEPDOWN

## 2017-07-08 PROCEDURE — 74011250636 HC RX REV CODE- 250/636: Performed by: ORTHOPAEDIC SURGERY

## 2017-07-08 RX ADMIN — OXYCODONE HYDROCHLORIDE 5 MG: 5 TABLET ORAL at 08:28

## 2017-07-08 RX ADMIN — FAMOTIDINE 20 MG: 20 TABLET ORAL at 20:27

## 2017-07-08 RX ADMIN — ACETAMINOPHEN 650 MG: 325 TABLET ORAL at 16:11

## 2017-07-08 RX ADMIN — ACETAMINOPHEN 650 MG: 325 TABLET ORAL at 04:47

## 2017-07-08 RX ADMIN — LEVOTHYROXINE SODIUM 88 MCG: 88 TABLET ORAL at 20:27

## 2017-07-08 RX ADMIN — LEFLUNOMIDE 20 MG: 20 TABLET ORAL at 20:27

## 2017-07-08 RX ADMIN — ATORVASTATIN CALCIUM 40 MG: 40 TABLET, FILM COATED ORAL at 20:27

## 2017-07-08 RX ADMIN — ONDANSETRON 4 MG: 2 INJECTION INTRAMUSCULAR; INTRAVENOUS at 09:23

## 2017-07-08 RX ADMIN — DOCUSATE SODIUM 100 MG: 100 CAPSULE, LIQUID FILLED ORAL at 08:26

## 2017-07-08 RX ADMIN — Medication 5 ML: at 05:57

## 2017-07-08 RX ADMIN — ACETAMINOPHEN 650 MG: 325 TABLET ORAL at 08:26

## 2017-07-08 RX ADMIN — DOCUSATE SODIUM 100 MG: 100 CAPSULE, LIQUID FILLED ORAL at 16:14

## 2017-07-08 RX ADMIN — TRAMADOL HYDROCHLORIDE 50 MG: 50 TABLET, FILM COATED ORAL at 22:18

## 2017-07-08 RX ADMIN — ACETAMINOPHEN 650 MG: 325 TABLET ORAL at 20:27

## 2017-07-08 RX ADMIN — FAMOTIDINE 20 MG: 20 TABLET ORAL at 08:26

## 2017-07-08 RX ADMIN — Medication 5 ML: at 08:30

## 2017-07-08 RX ADMIN — Medication 10 ML: at 20:32

## 2017-07-08 NOTE — PROGRESS NOTES
Problem: Mobility Impaired (Adult and Pediatric)  Goal: *Acute Goals and Plan of Care (Insert Text)  STG:  (1.)Mr. Félix Clinton will move from supine to sit and sit to supine , scoot up and down and roll side to side with STAND BY ASSIST within 3 day(s). (2.)Mr. Félix Clinton will transfer from bed to chair and chair to bed with CONTACT GUARD ASSIST using the least restrictive device within 3 day(s). (3.)Mr. Félix Clinton will ambulate with CONTACT GUARD ASSIST for 50 feet with the least restrictive device within 3 day(s). (4.)Mr. Félix Clinton will maintain spinal precautions throughout all functional mobility within 3 days with 0 verbal cues. (5.)Mr. Félix Clinton will perform standing static and dynamic balance activities x 10 minutes with CONTACT GUARD ASSIST to improve safety within 3 day(s). LTG:  (1.)Mr. Félix Clinton will move from supine to sit and sit to supine , scoot up and down and roll side to side in bed with SUPERVISION within 7 day(s). (2.)Mr. Félix Clinton will transfer from bed to chair and chair to bed with SUPERVISION using the least restrictive device within 7 day(s). (3.)Mr. Félix Clinton will ambulate with SUPERVISION for 100 feet with the least restrictive device within 7 day(s).   ________________________________________________________________________________________________      PHYSICAL THERAPY: Daily Note, Treatment Day: 1st and PM 7/8/2017  INPATIENT: Hospital Day: 3  [place] / Product Type: Medicare /      NAME/AGE/GENDER: Odalys Dumont is a 67 y.o. male     PRIMARY DIAGNOSIS: Spondylolisthesis of lumbar region [M43.16]  Lumbar spinal stenosis [M48.06] <principal problem not specified> <principal problem not specified>  Procedure(s) (LRB):  L2-L3 LAMINECTOMY AND FUSION WITH BONE MARROW ASPIRATE, ALLOGRAFT, INSTRUMENTATION L2-L5 WITH TLIF  (N/A)  2 Days Post-Op  ICD-10: Treatment Diagnosis:       · Difficulty in walking, Not elsewhere classified (R26.2) Precaution/Allergies:  Citric acid and Lisinopril       ASSESSMENT:      Mr. Koko Crooks is a 67 y.o. male s/p above surgery. He lives with spouse and typically ambulates with straight cane, however admits to more frequent falling recently. He is supine on contact and agreeable to therapy this PM. Required cues to recall spinal precautions. VC's and CGA needed to perform log rolling. He sat a couple of min then stood with CGA. He ambulated ~300' with RW. Initially he amb rather quickly but slowed down with VC's. Returned to chair in room with instruction to sit up ~ 30 min and call for assist to return to bed. Ronnie Evangelista is currently functioning below his baseline and would benefit from skilled PT during acute care stay to maximize safety and independence with functional mobility. This section established at most recent assessment   PROBLEM LIST (Impairments causing functional limitations):  1. Decreased Strength  2. Decreased ADL/Functional Activities  3. Decreased Transfer Abilities  4. Decreased Ambulation Ability/Technique  5. Decreased Balance  6. Increased Pain  7. Decreased Activity Tolerance  8. Decreased Knowledge of Precautions  9. Decreased [place] with Home Exercise Program  10. Decreased Cognition    INTERVENTIONS PLANNED: (Benefits and precautions of physical therapy have been discussed with the patient.)  1. Balance Exercise  2. Bed Mobility  3. Family Education  4. Gait Training  5. Home Exercise Program (HEP)  6. Therapeutic Activites  7. Therapeutic Exercise/Strengthening  8. Transfer Training  9. patient education  10. Group Therapy      TREATMENT PLAN: Frequency/Duration: twice daily for duration of hospital stay  Rehabilitation Potential For Stated Goals: GOOD      RECOMMENDED REHABILITATION/EQUIPMENT: (at time of discharge pending progress): Continue Skilled Therapy. HISTORY:   History of Present Injury/Illness (Reason for Referral):  Per MD Note:  \"The patient returns today with persistent symptoms of lumbar stenosis resulting in neurogenic claudication and functional deficit as previously described. The symptoms are predominately in the low back and bilateral posterior thighs and legs. The symptoms have been present for 1 year. The symptoms are described as a deep ache and cramping sensation in the backs and legs limiting his ability to walk and stand for more than a couple of minutes. The symptoms are worse with activities. There has been no lasting benefit from conservative efforts including a lumbar orthosis, activity modification, oral medications, and multiple epidural steroid injections. At this point he is ready to proceed with surgical intervention. \"  Past Medical History/Comorbidities:   Mr. Scott Omer  has a past medical history of Aneurysm ([address] ASCAD - of the native vessel (6/13/2016); CAD (coronary artery disease); Cancer ([address] Chronic pain; Coagulation disorder ([address] Crohn's colitis (Dr Pritesh Monterroso) (8/1/2012); crohns's disease; Disorder of bone and cartilage, unspecified (8/11/2014); Dyslipidemia (6/13/2016); GERD (gastroesophageal reflux disease); History of skin cancer; Hypercholesterolemia; Hypertension; MI (myocardial infarction) ([address] Obesity; Other specified inflammatory polyarthropathies (1/9/2013); PVC (premature ventricular contraction); PVC (premature ventricular contraction); RA (rheumatoid arthritis) ([address] Status Post PTCA/stent (6/13/2016); Thrombocytopenia, unspecified ([address] (4/10/2014); Thyroid disease; Trochanteric bursitis (5/1/2013); and Unspecified sleep apnea. He also has no past medical history of Adverse effect of anesthesia; Difficult intubation; Malignant hyperthermia due to anesthesia; Nausea & vomiting; or Pseudocholinesterase deficiency.   Mr. Scott Omer  has a past surgical history that includes lumbar fusion ([id number]); colonoscopy (Dec 2003); heart catheterization (2001 and 2003); left heart cath,percutaneous (2014); cataract removal; tonsillectomy; sinus surgery proc unlisted; heent; orthopaedic; and hernia repair. Social History/Living Environment:   Home Environment: Private residence  # Steps to Enter: 1  One/Two Story Residence: One story  Living Alone: No  Support Systems: Spouse/Significant Other/Partner  Patient Expects to be Discharged to[de-identified] Private residence  Current DME Used/Available at Home: Nadeem Cramp, straight, Walker, rollator, Walker, rolling  Prior Level of Function/Work/Activity:  Patient ambulated with straight cane prior to admission. Number of Personal Factors/Comorbidities that affect the Plan of Care: 1-2: MODERATE COMPLEXITY   EXAMINATION:   Most Recent Physical Functioning:   Gross Assessment:                  Posture:  Posture (WDL): Exceptions to WDL  Balance:  Sitting: Intact  Sitting - Static: Good (unsupported)  Sitting - Dynamic: Good (unsupported)  Standing: Impaired  Standing - Static: Fair  Standing - Dynamic : Fair Bed Mobility:     Wheelchair Mobility:     Transfers:  Sit to Stand: Contact guard assistance  Stand to Sit: Contact guard assistance  Gait:     Base of Support: Center of gravity altered;Narrowed  Speed/[place]: Fluctuations; Shuffled  Step Length: Left shortened;Right shortened  Gait Abnormalities: Decreased step clearance  Distance (ft): 300 Feet (ft)  Assistive Device: Walker, rolling  Ambulation - Level of Assistance: Contact guard assistance  Interventions: Safety awareness training;Verbal cues       Body Structures Involved:  1. Nerves  2. Bones  3. Joints  4. Muscles  5. Ligaments Body Functions Affected:  1. Sensory/Pain  2. Neuromusculoskeletal  3. Movement Related Activities and Participation Affected:  1. Mobility  2. Self Care  3. Domestic Life  4. Interpersonal Interactions and Relationships  5.  Community, Social and [place]   Number of elements that affect the Plan of Care: 4+: HIGH COMPLEXITY   CLINICAL PRESENTATION:   Presentation: Evolving clinical presentation with changing clinical characteristics: MODERATE COMPLEXITY   CLINICAL DECISION MAKIN[address] [place] [id number] AM-PAC 6 Clicks   Basic Mobility Inpatient Short Form  How much difficulty does the patient currently have. .. Unable A Lot A Little None   1. Turning over in bed (including adjusting bedclothes, sheets and blankets)? [ ] 1   [ ] 2   [X] 3   [ ] 4   2. Sitting down on and standing up from a chair with arms ( e.g., wheelchair, bedside commode, etc.)   [ ] 1   [ ] 2   [X] 3   [ ] 4   3. Moving from lying on back to sitting on the side of the bed? [ ] 1   [ ] 2   [X] 3   [ ] 4   How much help from another person does the patient currently need. .. Total A Lot A Little None   4. Moving to and from a bed to a chair (including a wheelchair)? [ ] 1   [ ] 2   [X] 3   [ ] 4   5. Need to walk in hospital room? [ ] 1   [X] 2   [ ] 3   [ ] 4   6. Climbing 3-5 steps with a railing? [ ] 1   [X] 2   [ ] 3   [ ] 4   © , Trustees of [address] [place] [id number], under license to [employer]. All rights reserved    Score:  Initial: 16 Most Recent: X (Date: -- )     Interpretation of Tool:  Represents activities that are increasingly more difficult (i.e. Bed mobility, Transfers, Gait). Score 24 23 22-20 19-15 14-10 9-7 6       Modifier CH CI CJ CK CL CM CN         · Mobility - Walking and Moving Around:               - CURRENT STATUS:    CK - 40%-59% impaired, limited or restricted               - GOAL STATUS:           CJ - 20%-39% impaired, limited or restricted               - D/C STATUS:                       ---------------To be determined---------------  [place] / Product Type: Medicare /       Medical Necessity:     · Patient demonstrates good rehab potential due to higher previous functional level. Reason for Services/Other Comments:  · Patient continues to require modification of therapeutic interventions to increase complexity of exercises.    Use of outcome tool(s) and clinical judgement create a POC that gives a: Questionable prediction of patient's progress: MODERATE COMPLEXITY                 TREATMENT:   (In addition to Assessment/Re-Assessment sessions the following treatments were rendered)   Pre-treatment Symptoms/Complaints:    Pain: Initial:     0 Post Session:  0      Therapeutic Activity: (    14 min): Therapeutic activities including gt training on level surface, bed mobility, chair transfers and education in spinal precautions to improve mobility and strength. Required mod Safety awareness training;Verbal cues . Braces/Orthotics/Lines/Etc:   · IV  · O2 Device: Room air  Treatment/Session Assessment:    · Response to Treatment:  Patient tolerated treatment without difficulties. · Interdisciplinary Collaboration:  · Physical Therapy Assistant and Registered Nurse  · After treatment position/precautions:  · Up in chair  · Bed/Chair-wheels locked  · Bed in low position  · Call light within reach  · RN notified  · Family at bedside  · Compliance with Program/Exercises: Will assess as treatment progresses. · Recommendations/Intent for next treatment session: \"Next visit will focus on advancements to more challenging activities and reduction in assistance provided\".   Total Treatment Duration:PT Patient Time In/Time Out  Time In: 1780  Time Out: 106 Ines Sauer, PTA

## 2017-07-08 NOTE — PROGRESS NOTES
July 8, 2017         Post Op day: 2 Days Post-Op     Admit Date: 7/6/2017  Admit Diagnosis: Spondylolisthesis of lumbar region [M43.16]  Lumbar spinal stenosis [M48.06]    LAB:    Recent Results (from the past 24 hour(s))   GLUCOSE, POC    Collection Time: 07/07/17 10:36 AM   Result Value Ref Range    Glucose (POC) 131 (H) 65 - 100 mg/dL   EKG, 12 LEAD, INITIAL    Collection Time: 07/07/17 10:45 AM   Result Value Ref Range    Ventricular Rate 59 BPM    Atrial Rate 59 BPM    P-R Interval 178 ms    QRS Duration 92 ms    Q-T Interval 446 ms    QTC Calculation (Bezet) 441 ms    Calculated P Axis 43 degrees    Calculated R Axis 7 degrees    Calculated T Axis 30 degrees    Diagnosis       Sinus bradycardia  Otherwise normal ECG  When compared with ECG of 05-JUN-2014 06:40,  No significant change was found  Confirmed by JOHANNA GUERRERO (), NOVA CHACON ([id number]) on 7/7/2017 4:00:32 PM     CBC W/O DIFF    Collection Time: 07/07/17 12:22 PM   Result Value Ref Range    WBC 7.3 4.3 - 11.1 K/uL    RBC 2.92 (L) 4.23 - 5.67 M/uL    HGB 9.2 (L) 13.6 - 17.2 g/dL    HCT 28.1 (L) 41.1 - 50.3 %    MCV 96.2 79.6 - 97.8 FL    MCH 31.5 26.1 - 32.9 PG    MCHC 32.7 31.4 - 35.0 g/dL    RDW 13.7 11.9 - 14.6 %    PLATELET 91 (L) 632 - 450 K/uL    MPV 10.3 (L) 10.8 - 25.8 FL   METABOLIC PANEL, BASIC    Collection Time: 07/07/17 12:22 PM   Result Value Ref Range    Sodium 142 136 - 145 mmol/L    Potassium 4.4 3.5 - 5.1 mmol/L    Chloride 108 (H) 98 - 107 mmol/L    CO2 26 21 - 32 mmol/L    Anion gap 8 7 - 16 mmol/L    Glucose 144 (H) 65 - 100 mg/dL    BUN 17 8 - 23 MG/DL    Creatinine 1.13 0.8 - 1.5 MG/DL    GFR est AA >60 >60 ml/min/1.73m2    GFR est non-AA >60 >60 ml/min/1.73m2    Calcium 7.8 (L) 8.3 - 10.4 MG/DL   PROTHROMBIN TIME + INR    Collection Time: 07/07/17 12:22 PM   Result Value Ref Range    Prothrombin time 12.0 9.6 - 12.0 sec    INR 1.1 0.9 - 1.2     PLEASE READ & DOCUMENT PPD TEST IN 24 HRS    Collection Time: 07/07/17  5:20 PM   Result Value Ref Range    PPD 0 0neg Negative    mm Induration 0 0 mm     Vital Signs:    Patient Vitals for the past 8 hrs:   BP Temp Pulse Resp SpO2   17 0335 138/73 98 °F (36.7 °C) 70 16 95 %     Temp (24hrs), Av °F (36.7 °C), Min:97.7 °F (36.5 °C), Max:98.5 °F (36.9 °C)        Subjective: Doing ok. No complaints, No SOB, No Chest Pain, No Nausea or Vomiting     Objective: Vital Signs are Stable, No Acute Distress, Alert and Oriented, Neurovascular exam is normal.         Plan: Continue Physical Therapy, Monitor Labs.             Signed By: Vince Frank MD

## 2017-07-08 NOTE — PROGRESS NOTES
Physical Therapy note: Pt declined treatment this AM stating he could not today because he passed out the other day from getting up. His wife agreed with him stating when he gets up it \"puts his heart into stress\". Explained to pt that he needs to move around to get better. He wife states he's getting up going to the BR several times a day. RN notified of pt's refusal. We will continue to encourage participation.

## 2017-07-09 VITALS
SYSTOLIC BLOOD PRESSURE: 146 MMHG | BODY MASS INDEX: 26.22 KG/M2 | HEART RATE: 71 BPM | RESPIRATION RATE: 18 BRPM | TEMPERATURE: 98 F | DIASTOLIC BLOOD PRESSURE: 83 MMHG | OXYGEN SATURATION: 97 % | WEIGHT: 187.25 LBS | HEIGHT: 71 IN

## 2017-07-09 PROCEDURE — 74011250637 HC RX REV CODE- 250/637: Performed by: ORTHOPAEDIC SURGERY

## 2017-07-09 PROCEDURE — 97530 THERAPEUTIC ACTIVITIES: CPT

## 2017-07-09 RX ADMIN — FAMOTIDINE 20 MG: 20 TABLET ORAL at 08:39

## 2017-07-09 RX ADMIN — ACETAMINOPHEN 650 MG: 325 TABLET ORAL at 03:58

## 2017-07-09 RX ADMIN — ACETAMINOPHEN 650 MG: 325 TABLET ORAL at 08:39

## 2017-07-09 RX ADMIN — Medication 10 ML: at 06:00

## 2017-07-09 RX ADMIN — DOCUSATE SODIUM 100 MG: 100 CAPSULE, LIQUID FILLED ORAL at 08:39

## 2017-07-09 NOTE — PROGRESS NOTES
Discharge order this AM  Waited for ambulation w PT   No discharge RN  Worked pt discharge around morning rounds/meds/ for my group.

## 2017-07-09 NOTE — PROGRESS NOTES
Problem: Mobility Impaired (Adult and Pediatric)  Goal: *Acute Goals and Plan of Care (Insert Text)  STG:  (1.)Mr. Shonna Hartley will move from supine to sit and sit to supine , scoot up and down and roll side to side with STAND BY ASSIST within 3 day(s). (2.)Mr. Shonna Hartley will transfer from bed to chair and chair to bed with CONTACT GUARD ASSIST using the least restrictive device within 3 day(s). (3.)Mr. Shonna Hartley will ambulate with CONTACT GUARD ASSIST for 50 feet with the least restrictive device within 3 day(s). (4.)Mr. Shonna Hartley will maintain spinal precautions throughout all functional mobility within 3 days with 0 verbal cues. (5.)Mr. Shonna Hartley will perform standing static and dynamic balance activities x 10 minutes with CONTACT GUARD ASSIST to improve safety within 3 day(s). LTG:  (1.)Mr. Shonna Hartley will move from supine to sit and sit to supine , scoot up and down and roll side to side in bed with SUPERVISION within 7 day(s). (2.)Mr. Shonna Hartley will transfer from bed to chair and chair to bed with SUPERVISION using the least restrictive device within 7 day(s). (3.)Mr. Shonna Hartley will ambulate with SUPERVISION for 100 feet with the least restrictive device within 7 day(s).   ________________________________________________________________________________________________      PHYSICAL THERAPY: Daily Note, Treatment Day: 2nd and AM 7/9/2017  INPATIENT: Hospital Day: 4  [place] / [place] / Product Type: Medicare /      NAME/AGE/GENDER: Kia Mitchell is a 67 y.o. male     PRIMARY DIAGNOSIS: Spondylolisthesis of lumbar region [M43.16]  Lumbar spinal stenosis [M48.06] <principal problem not specified> <principal problem not specified>  Procedure(s) (LRB):  L2-L3 LAMINECTOMY AND FUSION WITH BONE MARROW ASPIRATE, ALLOGRAFT, INSTRUMENTATION L2-L5 WITH TLIF  (N/A)  3 Days Post-Op  ICD-10: Treatment Diagnosis:       · Difficulty in walking, Not elsewhere classified (R26.2) Precaution/Allergies:  Citric acid and Lisinopril       ASSESSMENT:      Mr. Carolina Nieves is a 67 y.o. male s/p above surgery. He lives with spouse and typically ambulates with straight cane, however admits to more frequent falling recently. He is sitting up in chair on contact and agreeable to therapy. Required cues to recall spinal precautions. VC's and supervision to ambulate ~300' with RW. He ascended and descended 5 stairs x 2. Returned to chair in room with family present awaiting d/c. Aba Ya is currently functioning below his baseline. This section established at most recent assessment   PROBLEM LIST (Impairments causing functional limitations):  1. Decreased Strength  2. Decreased ADL/Functional Activities  3. Decreased Transfer Abilities  4. Decreased Ambulation Ability/Technique  5. Decreased Balance  6. Increased Pain  7. Decreased Activity Tolerance  8. Decreased Knowledge of Precautions  9. Decreased [place] with Home Exercise Program  10. Decreased Cognition    INTERVENTIONS PLANNED: (Benefits and precautions of physical therapy have been discussed with the patient.)  1. Balance Exercise  2. Bed Mobility  3. Family Education  4. Gait Training  5. Home Exercise Program (HEP)  6. Therapeutic Activites  7. Therapeutic Exercise/Strengthening  8. Transfer Training  9. patient education  10. Group Therapy      TREATMENT PLAN: Frequency/Duration: twice daily for duration of hospital stay  Rehabilitation Potential For Stated Goals: GOOD      RECOMMENDED REHABILITATION/EQUIPMENT: (at time of discharge pending progress): Continue Skilled Therapy. HISTORY:   History of Present Injury/Illness (Reason for Referral):  Per MD Note: \"The patient returns today with persistent symptoms of lumbar stenosis resulting in neurogenic claudication and functional deficit as previously described. The symptoms are predominately in the low back and bilateral posterior thighs and legs.  The symptoms have been present for 1 year. The symptoms are described as a deep ache and cramping sensation in the backs and legs limiting his ability to walk and stand for more than a couple of minutes. The symptoms are worse with activities. There has been no lasting benefit from conservative efforts including a lumbar orthosis, activity modification, oral medications, and multiple epidural steroid injections. At this point he is ready to proceed with surgical intervention. \"  Past Medical History/Comorbidities:   Mr. 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 He also has no past medical history of Adverse effect of anesthesia; Difficult intubation; Malignant hyperthermia due to anesthesia; Nausea & vomiting; or Pseudocholinesterase deficiency. Mr. Iglesia Fallon  has a past surgical history that includes lumbar fusion (1984-4845); colonoscopy (Dec 2003); heart catheterization (2001 and 2003); left heart cath,percutaneous (2014); cataract removal; tonsillectomy; sinus surgery proc unlisted; heent; orthopaedic; and hernia repair.   Social History/Living Environment:   Home Environment: Private residence  # Steps to Enter: 1  One/Two Story Residence: One story  Living Alone: No  Support Systems: Spouse/Significant Other/Partner  Patient Expects to be Discharged to[de-identified] Private residence  Current DME Used/Available at Home: Cathy Spark, straight, Walker, rollator, Walker, rolling  Prior Level of Function/Work/Activity:  Patient ambulated with straight cane prior to admission. Number of Personal Factors/Comorbidities that affect the Plan of Care: 1-2: MODERATE COMPLEXITY   EXAMINATION:   Most Recent Physical Functioning:   Gross Assessment:                  Posture:  Posture (WDL): Exceptions to WDL  Posture Assessment: Forward head  Balance:  Sitting: Intact  Standing: Impaired  Standing - Static: Fair  Standing - Dynamic : Fair Bed Mobility:     Wheelchair Mobility:     Transfers:  Sit to Stand: Supervision  Stand to Sit: Supervision  Gait:     Base of Support: Center of gravity altered  Speed/[place]a: Fluctuations  Step Length: Left shortened;Right shortened  Gait Abnormalities: Decreased step clearance  Distance (ft): 300 Feet (ft)  Assistive Device: Walker, rolling  Ambulation - Level of Assistance: Contact guard assistance  Interventions: Safety awareness training;Verbal cues       Body Structures Involved:  1. Nerves  2. Bones  3. Joints  4. Muscles  5. Ligaments Body Functions Affected:  1. Sensory/Pain  2. Neuromusculoskeletal  3. Movement Related Activities and Participation Affected:  1. Mobility  2. Self Care  3. Domestic Life  4. Interpersonal Interactions and Relationships  5. Community, Social and [place]   Number of elements that affect the Plan of Care: 4+: HIGH COMPLEXITY   CLINICAL PRESENTATION:   Presentation: Evolving clinical presentation with changing clinical characteristics: MODERATE COMPLEXITY   CLINICAL DECISION MAKIN [facility] Mobility Inpatient Short Form  How much difficulty does the patient currently have. .. Unable A Lot A Little None   1. Turning over in bed (including adjusting bedclothes, sheets and blankets)?    [ ] 1 [ ] 2   [X] 3   [ ] 4   2. Sitting down on and standing up from a chair with arms ( e.g., wheelchair, bedside commode, etc.)   [ ] 1   [ ] 2   [X] 3   [ ] 4   3. Moving from lying on back to sitting on the side of the bed? [ ] 1   [ ] 2   [X] 3   [ ] 4   How much help from another person does the patient currently need. .. Total A Lot A Little None   4. Moving to and from a bed to a chair (including a wheelchair)? [ ] 1   [ ] 2   [X] 3   [ ] 4   5. Need to walk in hospital room? [ ] 1   [X] 2   [ ] 3   [ ] 4   6. Climbing 3-5 steps with a railing? [ ] 1   [X] 2   [ ] 3   [ ] 4   © 2007, Trustees of [address] [place] [address], under license to [employer]. All rights reserved    Score:  Initial: 16 Most Recent: X (Date: -- )     Interpretation of Tool:  Represents activities that are increasingly more difficult (i.e. Bed mobility, Transfers, Gait). Score 24 23 22-20 19-15 14-10 9-7 6       Modifier CH CI CJ CK CL CM CN         · Mobility - Walking and Moving Around:               - CURRENT STATUS:    CK - 40%-59% impaired, limited or restricted               - GOAL STATUS:           CJ - 20%-39% impaired, limited or restricted               - D/C STATUS:                       ---------------To be determined---------------  [place] / [place] / Product Type: Medicare /       Medical Necessity:     · Patient demonstrates good rehab potential due to higher previous functional level. Reason for Services/Other Comments:  · Patient continues to require modification of therapeutic interventions to increase complexity of exercises.    Use of outcome tool(s) and clinical judgement create a POC that gives a: Questionable prediction of patient's progress: MODERATE COMPLEXITY                 TREATMENT:   (In addition to Assessment/Re-Assessment sessions the following treatments were rendered)   Pre-treatment Symptoms/Complaints:    Pain: Initial:     0 Post Session:  0 Therapeutic Activity: (    14 min): Therapeutic activities including gt training on level surface, chair transfers, stair climbing and education in spinal precautions to improve mobility and strength. Required mod Safety awareness training;Verbal cues . Braces/Orthotics/Lines/Etc:   · room air  Treatment/Session Assessment:    · Response to Treatment:  Patient tolerated treatment without difficulties. · Interdisciplinary Collaboration:  · Physical Therapy Assistant and Registered Nurse  · After treatment position/precautions:  · Up in chair  · Bed/Chair-wheels locked  · Bed in low position  · Call light within reach  · RN notified  · Family at bedside  · Compliance with Program/Exercises: Will assess as treatment progresses. · Recommendations/Intent for next treatment session: \"Next visit will focus on advancements to more challenging activities and reduction in assistance provided\".   Total Treatment Duration:PT Patient Time In/Time Out  Time In: 0901  Time Out: Radha Sauer PTA

## 2017-07-09 NOTE — DISCHARGE INSTRUCTIONS
Lumbar Laminectomy: What to Expect at [address] can expect your back to feel stiff or sore after surgery. This should improve in the weeks after surgery. You may have trouble sitting or standing in one position for very long and may need pain medicine in the weeks after your surgery. Your doctor may advise you to work with a physical therapist to strengthen the muscles around your spine and trunk. You will need to learn how to lift, twist, and bend so that you do not put too much strain on your back. This care sheet gives you a general idea about how long it will take for you to recover. But each person recovers at a different pace. Follow the steps below to get better as quickly as possible. How can you care for yourself at home? Activity  · Rest when you feel tired. Getting enough sleep will help you recover. · Try to walk each day. Start by walking a little more than you did the day before. Bit by bit, increase the amount you walk. Walking boosts blood flow and helps prevent pneumonia and constipation. Walking may also decrease your muscle soreness after surgery. · If advised by your doctor, you may need to avoid lifting anything that would cause excessive strain on your back. This may include a child, heavy grocery bags and milk containers, a heavy briefcase or backpack, cat litter or dog food bags, or a vacuum . · Avoid strenuous activities, such as bicycle riding, jogging, weight lifting, or aerobic exercise, until your doctor says it is okay. · Do not drive for 2 to 4 weeks after your surgery or until your doctor says it is okay. · Avoid riding in a car for more than 30 minutes at a time for 2 to 4 weeks after surgery. If you must ride in a car for a longer distance, stop often to walk and stretch your legs. · Try to change your position about every 30 minutes while sitting or standing. This will help decrease your back pain while you are healing.   · You will probably need to take 4 to 6 weeks off from work. It depends on the type of work you do and how you feel. · You may have sex as soon as you feel able, but avoid positions that put stress on your back or cause pain. Diet  · You can eat your normal diet. If your stomach is upset, try bland, low-fat foods like plain rice, broiled chicken, toast, and yogurt. · Drink plenty of fluids (unless your doctor tells you not to). · You may notice that your bowel movements are not regular right after your surgery. This is common. Try to avoid constipation and straining with bowel movements. You may want to take a fiber supplement every day. If you have not had a bowel movement after a couple of days, ask your doctor about taking a mild laxative. Medicines  · Your doctor will tell you if and when you can restart your medicines. He or she will also give you instructions about taking any new medicines. · If you take blood thinners, such as warfarin (Coumadin), clopidogrel (Plavix), or aspirin, be sure to talk to your doctor. He or she will tell you if and when to start taking those medicines again. Make sure that you understand exactly what your doctor wants you to do. · Take pain medicines exactly as directed. ¨ If the doctor gave you a prescription medicine for pain, take it as prescribed. ¨ If you are not taking a prescription pain medicine, ask your doctor if you can take an over-the-counter medicine. · If your doctor prescribed antibiotics, take them as directed. Do not stop taking them just because you feel better. You need to take the full course of antibiotics. · If you think your pain medicine is making you sick to your stomach:  ¨ Take your medicine after meals (unless your doctor has told you not to). ¨ Ask your doctor for a different pain medicine. Incision care  · If you have strips of tape on the cut (incision) the doctor made, leave the tape on for a week or until it falls off.   · Wash the area daily with warm, soapy water and pat it dry. · Keep the area clean and dry. You may cover it with a gauze bandage if it weeps or rubs against clothing. Change the bandage every day. Exercise  · Do back exercises as instructed by your doctor. · Your doctor may advise you to work with a physical therapist to improve the strength and flexibility of your back. Other instructions  · To reduce stiffness and help sore muscles, use a warm water bottle, a heating pad set on low, or a warm cloth on your back. Do not put heat right over the incision. Do not go to sleep with a heating pad on your skin. Follow-up care is a key part of your treatment and safety. Be sure to make and go to all appointments, and call your doctor if you are having problems. It's also a good idea to know your test results and keep a list of the medicines you take. When should you call for help? Call 911 anytime you think you may need emergency care. For example, call if:  · You passed out (lost consciousness). · You have sudden chest pain and shortness of breath, or you cough up blood. · You are unable to move a leg at all. Call your doctor now or seek immediate medical care if:  · You have new or worse symptoms in your legs or buttocks. Symptoms may include:  ¨ Numbness or tingling. ¨ Weakness. ¨ Pain. · You lose bladder or bowel control. · You have loose stitches, or your incision comes open. · You have blood or fluid draining from the incision. · You have signs of infection, such as:  ¨ Increased pain, swelling, warmth, or redness. ¨ Pus draining from the incision. ¨ A fever. ¨ Red streaks leading from the incision. Watch closely for changes in your health, and be sure to contact your doctor if:  · You do not have a bowel movement after taking a laxative. · You are not getting better as expected. Where can you learn more? Go to http://pricila-jose.info/.   Enter Y637 in the search box to learn more about \"Lumbar Laminectomy: What to Expect at Home. \"  Current as of: March 21, 2017  Content Version: 11.3  © 3749-3378 [employer]. Care instructions adapted under license by [employer] (which disclaims liability or warranty for this information). If you have questions about a medical condition or this instruction, always ask your healthcare professional. [place] [address] any warranty or liability for your use of this information.

## 2017-07-10 ENCOUNTER — PATIENT OUTREACH (OUTPATIENT)
Dept: CASE MANAGEMENT | Age: 77
End: 2017-07-10

## 2017-07-10 NOTE — PROGRESS NOTES
Please note I placed a call to patients PCP Dr. Faraz Rodgers and left a detailed vmail requesting a Hospital f/u appt. Within 7 days post d/c. Office may be closed for lunch. Also scheduled f/u call to office and/or check CC for scheduling of appointment. Date/Time of Call:       07/10/17 12:25 PM   What was the patient seen in the ED for? Patient was seen in ED for diagnosis of: Lumbar Stenosis w/neutrogenic claudication   Does the patient understand his/her diagnosis and/or treatment and what happened during the ED visit? Spoke with patient who stated understanding of treatment and diagnosis. Did the patient receive discharge instructions from the ED? Patient stated discharge instructions were received from the ED. Review any discharge instructions (see notes in [facility]). Ask patient if they understand these. Do they have any questions? Patient and Care Coordinator reviewed DC instructions. Patient stated understanding and no questions asked. Were home services ordered (nursing, PT, OT, ST, etc.)? No HH ordered at d/c. If so, has the first visit occurred? If not, why? (Assist with coordination of services if necessary.) N/A   Was any DME ordered? No DME ordered at d/c. If so, has it been received? If not, why?  (Assist with coordination of arranging DME orders if necessary.) N/A   Complete a review of all medications (new, continued and discontinued meds per the D/C instructions and medication tab in [address] [place] [address]). Review of medications has been completed by patient and Care Coordinator. Norton prescribed at d/c. Were all new prescriptions filled? If not, why?  (Assist with obtainment of medications if necessary.) Yes. Does the patient understand the purpose and dosing instructions for all medications? (If patient has questions, provide explanation and education.) Patient stated understanding and purpose for instructions for medications.    Does the patient have any problems in performing ADLs? (If patient is unable to perform ADLs  what is the limiting factor(s)? Do they have a support system that can assist? If no support system is present, discuss possible assistance that they may be able to obtain.) Patient states he is independent with all ADLs. Please note patient also states he has support from wife, and also his daughter who lives near- by. Does the patient have all follow-up appointments scheduled? Has transportation been arranged? 7 day f/up with PCP?    7-14 day f/up with specialist?    If f/up has not been made  what actions has the care coordinator made to accomplish this? Has transportation been arranged? [facility]monary follow-up should be within 7 days of discharge; all others should have PCP follow-up within 7 days of discharge; follow-ups with other specialists as appropriate or ordered.) Patient has f/u with surgeon scheduled with Surgeon on 7/24/17 at 8:20 AM and is within the 7-14 business day protocol. No PCP hospital f/u scheduled with Dr. Michael Merchant at d/c. I attempted to reach office and had to leave a vmail. I left a detailed vmail requesting a return call to either myself or the patient regarding a Hospital f/u appt. I have also scheduled a f/u call to office for 7/11 tomorrow or CC check regarding appt and f/u call to patient as well. Please note when I discussed with patient he stated I dont think Ill be going anywhere in the next 7 days. Educated, encouraged, and advised patient on the importance of the f/u appt. Patient stated well we will just have to see. Patient declines any transportation needs. Please note all efforts will continue to be made to schedule Hospital f/u appt. within 7 business days post d/c. Directors Angela Mercedes RN and Edward Leblanc RN have been notified as well as PCP office. Any other questions or concerns expressed by the patient? No other questions asked.   No further needs identified. Patient expressed gratitude for care and call. MONALISA Call Completed By: Criss Daugherty LPN   Care Coordinator  [facility]          This note will not be viewable in [address]

## 2017-07-10 NOTE — Clinical Note
Please note I have left a detailed vmail at Dr. Yoanna Ratliff office requesting a hospital fu appt. Within 7 business days post d/c. Also requested return call to myself or the patient. Office may be on lunch. Please note patient stated he doesn't think he'll be going anywhere in the next 7 days. Did educate and advise on importance, and patient stated we will see. I still have f/u calls to office and patient scheduled.

## 2017-07-11 ENCOUNTER — PATIENT OUTREACH (OUTPATIENT)
Dept: CASE MANAGEMENT | Age: 77
End: 2017-07-11

## 2017-07-11 NOTE — PROGRESS NOTES
Please note this patient was offered a hosp f/u appt fr Mon 7/17 with PCP office and stated he was told at d/c not to travel for 14 days so he will not be scheduling anything until after he is feeling up to it. Will route to Directors. F/U scheduled for next Tuesday just to check on pt. Fran Duran LPN/ Care Coordinator  [address], [address] / [place], [address]  [email or web address] note will not be viewable in [address]

## 2017-07-18 NOTE — DISCHARGE SUMMARY
Discharge Summary    Patient ID:Jairon Vazquez  [id number]  1940  67 y.o. Admit date: 7/6/2017    Discharge date: 07/09/2017    Admitting Physician:  Monica Cheatham MD    Discharge Physician: Monica Cheatham MD    Reason for Admission: spinal stenosis, spondylolisthesis    Discharge Diagnoses:  Patient Active Problem List   Diagnosis Code    Lumbar stenosis with neurogenic claudication M48.06    Rheumatoid arthritis ([address] M06.9    Hypothyroidism E03.9    Mixed hyperlipidemia E78.2    Chronic idiopathic thrombocytopenia ([address] D69.3    AAA (abdominal aortic aneurysm) ([address] I71.4    Dyspepsia, controlled by Zantac R10.13    Ophthalmic migraine G43. 109    Anemia, chronic disease D63.8    HARI (obstructive sleep apnea) G47.33    Pleural plaque with presence of asbestos J92.0    Left knee DJD M17.12    Chronic low back pain M54.5, G89.29    Adjustment disorder F43.20    Essential hypertension, benign I10    ASCAD - of the native vessel I25.10    Status Post PTCA/stent Z98.61    PVC (premature ventricular contraction) I49.3          SURGEON: Monica Cheatham MD    ASSISTANT: [facility]    A skilled  was deemed necessary for this case due to the intimate proximity of the thecal sac and spinal nerve roots. He was there for the entire duration of the case. PREOP DIAGNOSIS: Lumbar stenosis and kyphosis above a prior lumbar instrumented fusion. POSTOP DIAGNOSIS: Lumbar stenosis and kyphosis above a prior lumbar instrumented fusion. PROCEDURE:  1. Lumbar laminectomy L2 through L3  with bilateral foraminotomies. 2. Lumbar posterolateral fusion  L2 through L3 .  3. Pedicle screw instrumentation  L2 through L5 .  4. Bone marrow aspirate for allograft  5. Translumbar interbody fusion L2 through L3 .  6. Insertion biomechanical device L2 through L3   7. Local autograft  7.  Use of intraoperative microscope for visualization of the neural elements.       ANESTHESIA: General    ESTIMATED BLOOD LOSS:  650 ml    INTRAOPERATIVE COMPLICATIONS: None. POSTOP CONDITION: Stable.     IMPLANTS:   Implant Name Type Inv. Item Serial No.  Lot No. LRB No. Used Action   GRAFT BNE GRAN DBM 2-4MM 10CC -- OSTEOAMP - [id number]   GRAFT BNE GRAN DBM 2-4MM 10CC -- OSTEOAMP [id number] [employer]   N/A 1 Implanted   GRAFT DBM PTTY+ W/CHIP 10ML -- BIO DBM - [id number]   GRAFT DBM PTTY+ W/CHIP 10ML -- BIO DBM   ARCELIA [employer] HOWM [id number] N/A 1 Implanted   SCR SPNE BHUMI 3 6.5X50MM TI --  - [id number]   SCR SPNE BHUMI 3 6.5X50MM TI --    [employer] HOWM [id number] N/A 1 Implanted   SCR SPNE BHUMI 3 6.5X45MM TI --  - [id number]   SCR SPNE BHUMI 3 6.5X45MM TI --    [employer] HOWM [id number] N/A 1 Implanted   KASHIF SPNE BHUMI 3 6.3W068TD TI --  - [id number]   KASHIF SPNE BHUMI 3 6.7O997DX TI --    [employer] HOWM [id number] N/A 2 Implanted   SPACER SPNE VERT AVS 4D 7X25 --  - [id number]   SPACER SPNE VERT AVS 4D 7X25 --    [employer]M [id number] N/A 1 Implanted   BLOCKER SPNE BHUMI 3 TI --  - [id number]    BLOCKER SPNE BHUMI 3 TI --     [employer]IA [employer] HOWM [id number] N/A 8 Implanted       INDICATIONS FOR PROCEDURE: Patient has had low back pain with radiation to the buttocks and lower extremities for an extended period of time. The symptoms and exam findings were felt to be consistent with neurogenic claudication. The preoperative radiographs and other imaging confirmed showed spondylolisthesis and stenosis. Conservative measures have been exhausted as outlined in the H&P. The symptoms progressed to the point where there is difficulty performing any task that requires prolonged standing or walking which interfered with activities of daily living and ability to enjoy life. In the outpatient setting the risks, benefits and potential complications of the above-listed procedure were discussed with her and an informed consent was obtained. Hospital Course: Patient admitted to ortho floor. Antibiotics were given postop. SCD and steffanie hose were in place for DVT prophylaxis. Parker catheter was discontinued on POD # 1. Patient voided normally. Patient did not receive blood transfusion. Patient tolerated pain medications and po diet. Hemovac drain was removed on POD # 2. Dressing remained clean, dry, and intact. Physical Therapy started on the day following surgery and progressed to independent ambulation. Patient remained neurologically stable throughout hospital course. Reports improvement of preoperative pain. At the time of discharge, had understanding of precautions needed following surgery. Discharged to: Home    Condition: Stable:    New Medications: Norco    Follow up: 2 weeks      Discharge instructions:    -Resume pre hospital diet            -Resume home medications per medical continuation form     -Follow up in office as scheduled   -Call doctor immediately if T>100.5, increased pain, swelling, drainage.   -If shortness of breath or chest pain, immediately go to ER  -Post surgical instruction sheet given to patient    Signed:  Sanket Aguilera MD  7/18/2017

## 2017-08-11 ENCOUNTER — PATIENT OUTREACH (OUTPATIENT)
Dept: CASE MANAGEMENT | Age: 77
End: 2017-08-11

## 2017-08-11 NOTE — PROGRESS NOTES
All was verified as previously documented no further contact with pt. Rita Briones LPN/ Care Coordinator  [facility] [address], . [facility] / [place], [address] [facility]  [email or web address] note will not be viewable in [address]

## 2018-02-02 ENCOUNTER — APPOINTMENT (OUTPATIENT)
Dept: CT IMAGING | Age: 78
End: 2018-02-02
Attending: EMERGENCY MEDICINE
Payer: MEDICARE

## 2018-02-02 ENCOUNTER — HOSPITAL ENCOUNTER (EMERGENCY)
Age: 78
Discharge: HOME | End: 2018-02-03
Attending: EMERGENCY MEDICINE
Payer: MEDICARE

## 2018-02-02 ENCOUNTER — APPOINTMENT (OUTPATIENT)
Dept: GENERAL RADIOLOGY | Age: 78
End: 2018-02-02
Attending: EMERGENCY MEDICINE
Payer: MEDICARE

## 2018-02-02 DIAGNOSIS — S61.212A LACERATION OF RIGHT MIDDLE FINGER WITHOUT FOREIGN BODY WITHOUT DAMAGE TO NAIL, INITIAL ENCOUNTER: ICD-10-CM

## 2018-02-02 DIAGNOSIS — S63.289A DISLOCATION OF PIP JOINT OF FINGER, INITIAL ENCOUNTER: Primary | ICD-10-CM

## 2018-02-02 DIAGNOSIS — S00.11XA CONTUSION OF RIGHT PERIOCULAR REGION, INITIAL ENCOUNTER: ICD-10-CM

## 2018-02-02 LAB
ALBUMIN SERPL-MCNC: 3.5 G/DL (ref 3.2–4.6)
ALBUMIN/GLOB SERPL: 0.8 {RATIO} (ref 1.2–3.5)
ALP SERPL-CCNC: 87 U/L (ref 50–136)
ALT SERPL-CCNC: 25 U/L (ref 12–65)
ANION GAP SERPL CALC-SCNC: 9 MMOL/L (ref 7–16)
AST SERPL-CCNC: 29 U/L (ref 15–37)
BASOPHILS # BLD: 0 K/UL (ref 0–0.2)
BASOPHILS NFR BLD: 0 % (ref 0–2)
BILIRUB SERPL-MCNC: 0.5 MG/DL (ref 0.2–1.1)
BUN SERPL-MCNC: 18 MG/DL (ref 8–23)
CALCIUM SERPL-MCNC: 8.6 MG/DL (ref 8.3–10.4)
CHLORIDE SERPL-SCNC: 107 MMOL/L (ref 98–107)
CO2 SERPL-SCNC: 29 MMOL/L (ref 21–32)
CREAT SERPL-MCNC: 1.14 MG/DL (ref 0.8–1.5)
DIFFERENTIAL METHOD BLD: ABNORMAL
EOSINOPHIL # BLD: 0.3 K/UL (ref 0–0.8)
EOSINOPHIL NFR BLD: 5 % (ref 0.5–7.8)
ERYTHROCYTE [DISTWIDTH] IN BLOOD BY AUTOMATED COUNT: 14.8 % (ref 11.9–14.6)
GLOBULIN SER CALC-MCNC: 4.3 G/DL (ref 2.3–3.5)
GLUCOSE SERPL-MCNC: 100 MG/DL (ref 65–100)
HCT VFR BLD AUTO: 37.8 % (ref 41.1–50.3)
HGB BLD-MCNC: 12.2 G/DL (ref 13.6–17.2)
IMM GRANULOCYTES # BLD: 0 K/UL (ref 0–0.5)
IMM GRANULOCYTES NFR BLD AUTO: 0 % (ref 0–5)
INR PPP: 1.1
LYMPHOCYTES # BLD: 2.7 K/UL (ref 0.5–4.6)
LYMPHOCYTES NFR BLD: 47 % (ref 13–44)
MCH RBC QN AUTO: 30.7 PG (ref 26.1–32.9)
MCHC RBC AUTO-ENTMCNC: 32.3 G/DL (ref 31.4–35)
MCV RBC AUTO: 95.2 FL (ref 79.6–97.8)
MONOCYTES # BLD: 0.9 K/UL (ref 0.1–1.3)
MONOCYTES NFR BLD: 16 % (ref 4–12)
NEUTS SEG # BLD: 1.8 K/UL (ref 1.7–8.2)
NEUTS SEG NFR BLD: 32 % (ref 43–78)
PLATELET # BLD AUTO: 105 K/UL (ref 150–450)
PMV BLD AUTO: 10.7 FL (ref 10.8–14.1)
POTASSIUM SERPL-SCNC: 4 MMOL/L (ref 3.5–5.1)
PROT SERPL-MCNC: 7.8 G/DL (ref 6.3–8.2)
PROTHROMBIN TIME: 13.6 SEC (ref 11.5–14.5)
RBC # BLD AUTO: 3.97 M/UL (ref 4.23–5.67)
SODIUM SERPL-SCNC: 145 MMOL/L (ref 136–145)
WBC # BLD AUTO: 5.8 K/UL (ref 4.3–11.1)

## 2018-02-02 PROCEDURE — 70486 CT MAXILLOFACIAL W/O DYE: CPT

## 2018-02-02 PROCEDURE — 73562 X-RAY EXAM OF KNEE 3: CPT

## 2018-02-02 PROCEDURE — 77030002916 HC SUT ETHLN J&J -A

## 2018-02-02 PROCEDURE — 85610 PROTHROMBIN TIME: CPT | Performed by: EMERGENCY MEDICINE

## 2018-02-02 PROCEDURE — 80053 COMPREHEN METABOLIC PANEL: CPT | Performed by: EMERGENCY MEDICINE

## 2018-02-02 PROCEDURE — 99284 EMERGENCY DEPT VISIT MOD MDM: CPT | Performed by: EMERGENCY MEDICINE

## 2018-02-02 PROCEDURE — 85025 COMPLETE CBC W/AUTO DIFF WBC: CPT | Performed by: EMERGENCY MEDICINE

## 2018-02-02 PROCEDURE — 75810000301 HC ER LEVEL 1 CLOSED TREATMNT FRACTURE/DISLOCATION: Performed by: EMERGENCY MEDICINE

## 2018-02-02 PROCEDURE — 73130 X-RAY EXAM OF HAND: CPT

## 2018-02-02 PROCEDURE — 70450 CT HEAD/BRAIN W/O DYE: CPT

## 2018-02-02 PROCEDURE — 75810000293 HC SIMP/SUPERF WND  RPR: Performed by: EMERGENCY MEDICINE

## 2018-02-02 RX ORDER — SODIUM CHLORIDE 0.9 % (FLUSH) 0.9 %
5-10 SYRINGE (ML) INJECTION EVERY 8 HOURS
Status: DISCONTINUED | OUTPATIENT
Start: 2018-02-02 | End: 2018-02-03 | Stop reason: HOSPADM

## 2018-02-02 RX ORDER — SODIUM CHLORIDE 0.9 % (FLUSH) 0.9 %
5-10 SYRINGE (ML) INJECTION AS NEEDED
Status: DISCONTINUED | OUTPATIENT
Start: 2018-02-02 | End: 2018-02-03 | Stop reason: HOSPADM

## 2018-02-03 ENCOUNTER — APPOINTMENT (OUTPATIENT)
Dept: GENERAL RADIOLOGY | Age: 78
End: 2018-02-03
Attending: EMERGENCY MEDICINE
Payer: MEDICARE

## 2018-02-03 VITALS
WEIGHT: 185 LBS | SYSTOLIC BLOOD PRESSURE: 166 MMHG | DIASTOLIC BLOOD PRESSURE: 82 MMHG | OXYGEN SATURATION: 98 % | HEIGHT: 70 IN | BODY MASS INDEX: 26.48 KG/M2 | HEART RATE: 84 BPM | RESPIRATION RATE: 18 BRPM | TEMPERATURE: 98.2 F

## 2018-02-03 PROCEDURE — 74011250637 HC RX REV CODE- 250/637: Performed by: EMERGENCY MEDICINE

## 2018-02-03 PROCEDURE — 90471 IMMUNIZATION ADMIN: CPT | Performed by: EMERGENCY MEDICINE

## 2018-02-03 PROCEDURE — 75810000293 HC SIMP/SUPERF WND  RPR: Performed by: EMERGENCY MEDICINE

## 2018-02-03 PROCEDURE — 90715 TDAP VACCINE 7 YRS/> IM: CPT | Performed by: EMERGENCY MEDICINE

## 2018-02-03 PROCEDURE — 74011250636 HC RX REV CODE- 250/636: Performed by: EMERGENCY MEDICINE

## 2018-02-03 PROCEDURE — 73140 X-RAY EXAM OF FINGER(S): CPT

## 2018-02-03 RX ORDER — HYDROCODONE BITARTRATE AND ACETAMINOPHEN 5; 325 MG/1; MG/1
1-2 TABLET ORAL
Qty: 20 TAB | Refills: 0 | Status: SHIPPED | OUTPATIENT
Start: 2018-02-03 | End: 2018-08-13

## 2018-02-03 RX ORDER — HYDROCODONE BITARTRATE AND ACETAMINOPHEN 10; 325 MG/1; MG/1
1 TABLET ORAL
Status: COMPLETED | OUTPATIENT
Start: 2018-02-03 | End: 2018-02-03

## 2018-02-03 RX ORDER — CEPHALEXIN 500 MG/1
500 CAPSULE ORAL 4 TIMES DAILY
Qty: 28 CAP | Refills: 0 | Status: SHIPPED | OUTPATIENT
Start: 2018-02-03 | End: 2018-02-10

## 2018-02-03 RX ORDER — CEPHALEXIN 500 MG/1
500 CAPSULE ORAL
Status: COMPLETED | OUTPATIENT
Start: 2018-02-03 | End: 2018-02-03

## 2018-02-03 RX ORDER — NAPROXEN SODIUM 550 MG/1
550 TABLET ORAL 2 TIMES DAILY WITH MEALS
Qty: 20 TAB | Refills: 0 | Status: SHIPPED | OUTPATIENT
Start: 2018-02-03 | End: 2018-09-19 | Stop reason: ALTCHOICE

## 2018-02-03 RX ADMIN — CEPHALEXIN 500 MG: 500 CAPSULE ORAL at 00:42

## 2018-02-03 RX ADMIN — TETANUS TOXOID, REDUCED DIPHTHERIA TOXOID AND ACELLULAR PERTUSSIS VACCINE, ADSORBED 0.5 ML: 5; 2.5; 8; 8; 2.5 SUSPENSION INTRAMUSCULAR at 00:38

## 2018-02-03 RX ADMIN — HYDROCODONE BITARTRATE AND ACETAMINOPHEN 1 TABLET: 10; 325 TABLET ORAL at 00:42

## 2018-02-03 NOTE — ED TRIAGE NOTES
Tripped while walking dog. Open fracture of right hand third digit. Swelling under right eye. Bleeding from nose. Pain right knee. Denies LOC.

## 2018-02-03 NOTE — DISCHARGE INSTRUCTIONS
Antibiotic ointment of choice for three days  Keep stitches in for 10 days  May wash gently, but do NOT soak or submerge. Keep clean and dry with bandage  Return if worse in any way    Rest, ice, elevate    Meds as directed  Follow-up family doctor next week if everything seems fine with the finger regarding sensation, and movement. If Anything seems wrong with the finger either numbness, weakness, or less than full range of motion . . . Then follow-up with Dr. Dalila Briggs from [facility]     Return to the ER if worse in any way  Dislocated Finger: Care Instructions  Your Care Instructions  When the bones of a finger are forced out of their normal position, it is called a dislocated finger. This can happen when a finger jams or bends backward. It is common during sports. A doctor can put your finger back in its normal position. You probably knew that something was wrong with your finger right away. This is because a dislocated finger usually hurts a lot. And it doesn't look straight. Your doctor may have put a splint on the finger. This will keep it in position while it heals. Your doctor may also recommend exercises to strengthen your finger. Rest and home treatment can also help you get better. If you damaged bones or muscles, you may need more treatment. Follow-up care is a key part of your treatment and safety. Be sure to make and go to all appointments, and call your doctor if you are having problems. It's also a good idea to know your test results and keep a list of the medicines you take. How can you care for yourself at home? · If your doctor put a splint on your finger, wear it as directed. Do not remove it until your doctor says you can. · Do not do anything that makes the pain worse. · If your finger is swollen, put ice or a cold pack on it for 10 to 20 minutes at a time. Try to do this every 1 to 2 hours for the next 3 days (when you are awake) or until the swelling goes down.  Put a thin cloth between the ice and your skin. · Prop up your hand on a pillow when you ice it or anytime you sit or lie down during the next 3 days. Try to keep it above the level of your heart. This will help reduce swelling. · Take your medicines exactly as prescribed. Call your doctor if you think you are having a problem with your medicine. · Ask your doctor if you can take an over-the-counter pain medicine, such as acetaminophen (Tylenol), ibuprofen (Advil, Motrin), or naproxen (Aleve). Be safe with medicines. Read and follow all instructions on the label. · If your doctor recommends exercises, do them as directed. When should you call for help? Call your doctor now or seek immediate medical care if:  ? · You have new or worse pain. ? · Your finger is cool or pale or changes color. ? · You have tingling, weakness, or numbness in the finger. ? Watch closely for changes in your health, and be sure to contact your doctor if:  ? · You do not get better as expected. Where can you learn more? Go to http://pricila-jose.info/. Enter R117 in the search box to learn more about \"Dislocated Finger: Care Instructions. \"  Current as of: March 21, 2017  Content Version: 11.4  © 4152-7406 [employer]. Care instructions adapted under license by [employer] (which disclaims liability or warranty for this information). If you have questions about a medical condition or this instruction, always ask your healthcare professional. [place] [address] any warranty or liability for your use of this information.

## 2018-02-03 NOTE — ED NOTES
I have reviewed discharge instructions with the patient. The patient verbalized understanding. Patient left ED via Discharge Method: wheelchair to Home with (insert name of family/friend, self, transport via daughter). Opportunity for questions and clarification provided. Patient given 3 scripts. To continue your aftercare when you leave the hospital, you may receive an automated call from our care team to check in on how you are doing. This is a free service and part of our promise to provide the best care and service to meet your aftercare needs.  If you have questions, or wish to unsubscribe from this service please call [phone]. Thank you for Choosing our [facility] Emergency Department.

## 2018-02-03 NOTE — ED PROVIDER NOTES
HPI Comments: Got tripped up walknig their ni    Patient is a 65 y.o. male presenting with fall. The history is provided by the patient. Fall   The accident occurred less than 1 hour ago. The fall occurred while walking. He fell from a height of ground level. He landed on concrete. The point of impact was the head. The pain is present in the head (r long finger). The pain is mild. He was ambulatory at the scene. Associated symptoms include laceration. Pertinent negatives include no numbness, no abdominal pain, no nausea, no vomiting, no hematuria, no headaches, no extremity weakness, no loss of consciousness and no tingling. He has tried nothing for the symptoms. It is unknown when the patient last had a tetanus shot.         Past Medical History:   Diagnosis Date    [facility]     ASCAD - of the native vessel 6/13/2016    CAD (coronary artery disease)     3 stents  had M.I. 2001    [facility])     melanoma    Chronic pain     back    [facility]     Crohn's colitis (Dr Rebecca Light) 8/1/2012    crohns's disease     stable    Disorder of bone and cartilage, unspecified 8/11/2014    Dyslipidemia 6/13/2016    GERD (gastroesophageal reflux disease)     under control with med    History of skin cancer     ear and right forearm    Hypercholesterolemia     Hypertension     MI (myocardial infarction)     2001    Obesity     30.5 bmi    Other specified inflammatory polyarthropathies(714.89) 1/9/2013    PVC (premature ventricular contraction)     on Kardia RS    PVC (premature ventricular contraction)     RA (rheumatoid arthritis) ([facility]     takes remicaid    Status Post PTCA/stent 6/13/2016    Thrombocytopenia, unspecified 4/10/2014    Thyroid disease     hypo. under control with med    Trochanteric bursitis 5/1/2013    Unspecified sleep apnea     not using c-pap       Past Surgical History:   Procedure Laterality Date    HX CATARACT REMOVAL      gadiel iol    HX COLONOSCOPY Dec 2003   [facility] HX HEART CATHETERIZATION  2001 and 2003    1 stent 2001 then 2 stents 2003 per pt    HX HEENT      floater laser surgery    HX HERNIA REPAIR      right    HX LUMBAR FUSION  2010-2011    X 2    HX ORTHOPAEDIC      back surgery x 2    HX OTHER SURGICAL  08/2017    back surgery, Dr Kip Escobar  2014    SINUS SURGERY PROC UNLISTED           Family History:   Problem Relation Age of Onset    Heart Disease Mother     Hypertension Mother     Heart Disease Father     Hypertension Father     Other Brother      AAA    Gall Bladder Disease Brother        Social History     Social History    Marital status:      Spouse name: N/A    Number of children: N/A    Years of education: N/A     Occupational History    Not on file. Social History Main Topics    Smoking status: Former Smoker     Packs/day: 0.50     Years: 20.00     Quit date: 1/1/1979    Smokeless tobacco: Never Used      Comment: quit 1098    Alcohol use No    Drug use: No    Sexual activity: Not on file     Other Topics Concern    Not on file     Social History Narrative    Wife has Parkinsons near end stage         ALLERGIES: Citric acid and Lisinopril    Review of Systems   HENT: Positive for facial swelling. Negative for dental problem and nosebleeds. Eyes: Negative for pain and redness. Respiratory: Negative for shortness of breath and stridor. Cardiovascular: Negative for chest pain. Gastrointestinal: Negative for abdominal pain, nausea and vomiting. Genitourinary: Negative for difficulty urinating and hematuria. Musculoskeletal: Positive for arthralgias. Negative for back pain, extremity weakness, gait problem, joint swelling, myalgias, neck pain and neck stiffness. Skin: Positive for wound. Negative for pallor. Neurological: Negative for dizziness, tingling, loss of consciousness, syncope, weakness, numbness and headaches.    All other systems reviewed and are negative. Vitals:    02/02/18 2201 02/03/18 0123   BP: (!) 196/94 166/82   Pulse: 79 84   Resp: 18    Temp: 97.5 °F (36.4 °C) 98.2 °F (36.8 °C)   SpO2: 97% 98%   Weight: 83.9 kg (185 lb)    Height: 5' 10\" (1.778 m)             Physical Exam   Constitutional: He is oriented to person, place, and time. He appears well-developed and well-nourished. No distress. HENT:   Head: Normocephalic. Abrasion to nose,  Juancarlos ecchymotic right periorbital contusion   Eyes: Conjunctivae and EOM are normal. Right eye exhibits no discharge. Left eye exhibits no discharge. Neck: Normal range of motion. Neck supple. Pulmonary/Chest: Effort normal. No respiratory distress. Musculoskeletal: He exhibits tenderness. Right hand: He exhibits decreased range of motion, tenderness, deformity and laceration. Hands:  Neurological: He is alert and oriented to person, place, and time. He has normal strength. He exhibits normal muscle tone. cni 2-12 grossly  Nl gait,  Nl speech     Skin: Skin is warm and dry. Laceration noted. No rash noted. He is not diaphoretic. Psychiatric: He has a normal mood and affect. His behavior is normal.   Nursing note and vitals reviewed. MDM  Number of Diagnoses or Management Options  Contusion of right periocular region, initial encounter:   Dislocation of PIP joint of finger, initial encounter:   Laceration of right middle finger without foreign body without damage to nail, initial encounter:   Diagnosis management comments: Medical decision making note:  Fell  Facial contusion  Finger lac and dislocation,  Reduced, and sutured  This concludes the \"medical decision making note\" part of this emergency department visit note.           ED Course       Wound Repair  Performed by: attendingLocation details: right long finger  Wound length:2.5 cm or less  Anesthesia: digital block    Anesthesia:  Local Anesthetic: lidocaine 1% without epinephrine  Anesthetic total: 4 mL  Irrigation solution: saline  Irrigation method: syringe  Debridement: none  Skin closure: 4-0 nylon  Number of sutures: 3  Technique: simple  Approximation: close  Dressing: antibiotic ointment and non-adhesive packing strip  Patient tolerance: Patient tolerated the procedure well with no immediate complications  My total time at bedside, performing this procedure was 1-15 minutes. DISLOCATION-UPPER EXT (ASAP ONLY)  Performed by: Karely Finch  Authorized by: Karely Finch     Consent:     Consent obtained:  Verbal    Consent given by:  Patient    Risks discussed:  Nerve damage and irreducible dislocation    Alternatives discussed:  No treatment  Location:     Location:  Finger    Finger location:  R long finger    Finger dislocation type: PIP    Pre-procedure assessment:     Pre-procedure imaging:  X-ray (radioloogist mis read xray as normal)    Distal perfusion: normal    Anesthesia (see MAR for exact dosages): Anesthesia method:  Nerve block    Block needle gauge:  27 G    Block anesthetic:  Lidocaine 1% w/o epi    Block technique:  Digital, duhhh    Block injection procedure:  Anatomic landmarks identified    Block outcome:  Anesthesia achieved  Procedure details:     Manipulation performed: yes      Shoulder reduction method: recreat amelia, traction, replace. Reduction successful: yes      Reduction confirmed with imaging: yes    Post-procedure assessment:     Neurological function: normal      Distal perfusion: normal      Range of motion: improved      Patient tolerance of procedure:   Tolerated well, no immediate complications

## 2018-02-06 ENCOUNTER — HOSPITAL ENCOUNTER (EMERGENCY)
Age: 78
Discharge: HOME | End: 2018-02-06
Attending: EMERGENCY MEDICINE
Payer: MEDICARE

## 2018-02-06 ENCOUNTER — APPOINTMENT (OUTPATIENT)
Dept: CT IMAGING | Age: 78
End: 2018-02-06
Attending: EMERGENCY MEDICINE
Payer: MEDICARE

## 2018-02-06 VITALS
RESPIRATION RATE: 18 BRPM | DIASTOLIC BLOOD PRESSURE: 88 MMHG | OXYGEN SATURATION: 96 % | HEIGHT: 70 IN | HEART RATE: 81 BPM | TEMPERATURE: 97.7 F | WEIGHT: 185 LBS | BODY MASS INDEX: 26.48 KG/M2 | SYSTOLIC BLOOD PRESSURE: 187 MMHG

## 2018-02-06 DIAGNOSIS — F07.81 POST CONCUSSION SYNDROME: Primary | ICD-10-CM

## 2018-02-06 DIAGNOSIS — I10 ACCELERATED HYPERTENSION: ICD-10-CM

## 2018-02-06 PROCEDURE — 99284 EMERGENCY DEPT VISIT MOD MDM: CPT | Performed by: EMERGENCY MEDICINE

## 2018-02-06 PROCEDURE — 74011250637 HC RX REV CODE- 250/637: Performed by: EMERGENCY MEDICINE

## 2018-02-06 PROCEDURE — 70450 CT HEAD/BRAIN W/O DYE: CPT

## 2018-02-06 RX ORDER — CLONIDINE HYDROCHLORIDE 0.1 MG/1
0.2 TABLET ORAL
Status: COMPLETED | OUTPATIENT
Start: 2018-02-06 | End: 2018-02-06

## 2018-02-06 RX ORDER — METOPROLOL TARTRATE 25 MG/1
25 TABLET, FILM COATED ORAL 2 TIMES DAILY
Qty: 60 TAB | Refills: 0 | Status: SHIPPED | OUTPATIENT
Start: 2018-02-06 | End: 2018-02-08 | Stop reason: SDUPTHER

## 2018-02-06 RX ADMIN — CLONIDINE HYDROCHLORIDE 0.2 MG: 0.1 TABLET ORAL at 22:59

## 2018-02-06 NOTE — ED TRIAGE NOTES
Pt was seen here on the 2nd for a fall and had a head and face ct done. Pt states he has been having a headache since the fall. Pt states the pain sometimes is more intense than other.

## 2018-02-07 NOTE — ED NOTES
D/c instructions to pt and family. Medication education given for rx x1. All questions answered, pt and family verbalize understanding. Pt alert, no active bleeding, oriented, neuro intact. Pt medicated for HTN. Pt up OOB to standing position without assist or dizziness. Pt to w/c, POV.

## 2018-02-07 NOTE — ED PROVIDER NOTES
HPI Comments: Patient was referred to the emergency department by optometrist after evaluation for injuries sustained 4 days ago after a fall. The patient was seen in this emergency department 4 days ago after the fall in which he sustained a head injury and facial injuries. CT of the head and maxillofacial CT at that time were unremarkable for evidence of fracture or intracranial hemorrhage. Patient states he's had a persistent frontal headache as well as episodes of disequilibrium associated with sudden position changes and nausea since the injury. This is despite taking hydrocodone as prescribed for pain management. He was seeing optometrist today due to some conjunctival hemorrhage in the right eye. Patient is a 65 y.o. male presenting with headaches. The history is provided by the patient. Headache    This is a new problem. The current episode started more than 2 days ago. The problem occurs constantly. The problem has not changed since onset. Associated with: recent head injury. The pain is located in the frontal region. The quality of the pain is described as dull. The pain is at a severity of 6/10. The pain is moderate. Associated symptoms include dizziness and nausea. Pertinent negatives include no anorexia, no fever, no malaise/fatigue, no chest pressure, no near-syncope, no orthopnea, no palpitations, no syncope, no shortness of breath, no weakness, no tingling, no visual change and no vomiting. He has tried oral narcotic analgesics for the symptoms. The treatment provided moderate relief.         Past Medical History:   Diagnosis Date    [facility]     ASCAD - of the native vessel 6/13/2016    CAD (coronary artery disease)     3 stents  had M.I. 2001    Cancer [facility])     melanoma    Chronic pain     back    [facility]     Crohn's colitis (Dr Etta White) 8/1/2012    crohns's disease     stable    Disorder of bone and cartilage, unspecified 8/11/2014    Dyslipidemia 6/13/2016    GERD (gastroesophageal reflux disease)     under control with med    History of skin cancer     ear and right forearm    Hypercholesterolemia     Hypertension     MI (myocardial infarction)     2001    Obesity     30.5 bmi    Other specified inflammatory polyarthropathies(714.89) 1/9/2013    PVC (premature ventricular contraction)     on Kardia RS    PVC (premature ventricular contraction)     RA (rheumatoid arthritis) ([address]     takes remicaid    Status Post PTCA/stent 6/13/2016    Thrombocytopenia, unspecified 4/10/2014    Thyroid disease     hypo. under control with med    Trochanteric bursitis 5/1/2013    Unspecified sleep apnea     not using c-pap       Past Surgical History:   Procedure Laterality Date    HX CATARACT REMOVAL      gadiel iol    HX COLONOSCOPY  Dec 2003   Patrick Fake HX HEART CATHETERIZATION  2001 and 2003    1 stent 2001 then 2 stents 2003 per pt    HX HEENT      floater laser surgery    HX HERNIA REPAIR      right    HX LUMBAR FUSION  2010-2011    X 2    HX ORTHOPAEDIC      back surgery x 2    HX OTHER SURGICAL  08/2017    back surgery, Dr José Miguel Hubbard  2014    SINUS SURGERY PROC UNLISTED           Family History:   Problem Relation Age of Onset    Heart Disease Mother     Hypertension Mother     Heart Disease Father     Hypertension Father     Other Brother      AAA    Gall Bladder Disease Brother        Social History     Social History    Marital status:      Spouse name: N/A    Number of children: N/A    Years of education: N/A     Occupational History    Not on file.      Social History Main Topics    Smoking status: Former Smoker     Packs/day: 0.50     Years: 20.00     Quit date: 1/1/1979    Smokeless tobacco: Never Used      Comment: quit 1098    Alcohol use No    Drug use: No    Sexual activity: Not on file     Other Topics Concern    Not on file     Social History Narrative    Wife has Parkinsons near end stage         ALLERGIES: Citric acid and Lisinopril    Review of Systems   Constitutional: Negative for fever and malaise/fatigue. Respiratory: Negative for shortness of breath. Cardiovascular: Negative for palpitations, orthopnea, syncope and near-syncope. Gastrointestinal: Positive for nausea. Negative for anorexia and vomiting. Neurological: Positive for dizziness and headaches. Negative for tingling and weakness. All other systems reviewed and are negative. Vitals:    02/06/18 1823   BP: 159/90   Pulse: 70   Resp: 18   Temp: 97.7 °F (36.5 °C)   SpO2: 96%   Weight: 83.9 kg (185 lb)   Height: 5' 10\" (1.778 m)            Physical Exam   Constitutional: He is oriented to person, place, and time. He appears well-developed and well-nourished. No distress. HENT:   Head: Normocephalic. Right Ear: External ear normal.   Left Ear: External ear normal.   Mouth/Throat: Oropharynx is clear and moist. No oropharyngeal exudate. Patient still has dried blood to the bridge of his nose, and some mild tenderness to the right maxilla. Eyes: EOM are normal. Pupils are equal, round, and reactive to light. Some conjunctival noted to the lower half of the right eye   Neck: Normal range of motion. Neck supple. Cardiovascular: Normal rate, regular rhythm and normal heart sounds. Pulmonary/Chest: Effort normal and breath sounds normal.   Abdominal: Soft. Bowel sounds are normal.   Musculoskeletal: Normal range of motion. Bandages to the right hand   Neurological: He is alert and oriented to person, place, and time. Skin: Skin is warm and dry. Psychiatric: He has a normal mood and affect. His behavior is normal.   Nursing note and vitals reviewed. MDM  Number of Diagnoses or Management Options  Diagnosis management comments: Given the patient's persistent symptoms and history of Plavix use a repeat CT will be obtained to rule out subdural hematoma formation.   Symptoms otherwise consistent with a postconcussion syndrome. Amount and/or Complexity of Data Reviewed  Tests in the radiology section of CPT®: ordered and reviewed    Risk of Complications, Morbidity, and/or Mortality  Presenting problems: low  Diagnostic procedures: low  Management options: low    Patient Progress  Patient progress: stable        ED Course       Procedures      Check of vital signs prior to discharge and indicates significant hypertension. Upon further questioning the patient is monitoring his blood pressure home for evaluation of hypertension by his primary care provider regarding back to October. He apparently has an appointment for follow-up in March. He reports blood pressure measurements at home up to around 345 systolic going back 2-3 months. He is currently on no treatment.   Given the significantly elevated blood pressure at dose of clonidine will be given in the emergency department  With prescription for antihypertensive medication  Until follow-up with his primary care provider

## 2018-02-07 NOTE — DISCHARGE INSTRUCTIONS
High Blood Pressure: Care Instructions  Your Care Instructions    If your blood pressure is usually above 140/90, you have high blood pressure, or hypertension. That means the top number is 140 or higher or the bottom number is 90 or higher, or both. Despite what a lot of people think, high blood pressure usually doesn't cause headaches or make you feel dizzy or lightheaded. It usually has no symptoms. But it does increase your risk for heart attack, stroke, and kidney or eye damage. The higher your blood pressure, the more your risk increases. Your doctor will give you a goal for your blood pressure. Your goal will be based on your health and your age. An example of a goal is to keep your blood pressure below 140/90. Lifestyle changes, such as eating healthy and being active, are always important to help lower blood pressure. You might also take medicine to reach your blood pressure goal.  Follow-up care is a key part of your treatment and safety. Be sure to make and go to all appointments, and call your doctor if you are having problems. It's also a good idea to know your test results and keep a list of the medicines you take. How can you care for yourself at home? Medical treatment  · If you stop taking your medicine, your blood pressure will go back up. You may take one or more types of medicine to lower your blood pressure. Be safe with medicines. Take your medicine exactly as prescribed. Call your doctor if you think you are having a problem with your medicine. · Talk to your doctor before you start taking aspirin every day. Aspirin can help certain people lower their risk of a heart attack or stroke. But taking aspirin isn't right for everyone, because it can cause serious bleeding. · See your doctor regularly. You may need to see the doctor more often at first or until your blood pressure comes down.   · If you are taking blood pressure medicine, talk to your doctor before you take decongestants or anti-inflammatory medicine, such as ibuprofen. Some of these medicines can raise blood pressure. · Learn how to check your blood pressure at home. Lifestyle changes  · Stay at a healthy weight. This is especially important if you put on weight around the waist. Losing even 10 pounds can help you lower your blood pressure. · If your doctor recommends it, get more exercise. Walking is a good choice. Bit by bit, increase the amount you walk every day. Try for at least 30 minutes on most days of the week. You also may want to swim, bike, or do other activities. · Avoid or limit alcohol. Talk to your doctor about whether you can drink any alcohol. · Try to limit how much sodium you eat to less than 2,300 milligrams (mg) a day. Your doctor may ask you to try to eat less than 1,500 mg a day. · Eat plenty of fruits (such as bananas and oranges), vegetables, legumes, whole grains, and low-fat dairy products. · Lower the amount of saturated fat in your diet. Saturated fat is found in animal products such as milk, cheese, and meat. Limiting these foods may help you lose weight and also lower your risk for heart disease. · Do not smoke. Smoking increases your risk for heart attack and stroke. If you need help quitting, talk to your doctor about stop-smoking programs and medicines. These can increase your chances of quitting for good. When should you call for help? Call 911 anytime you think you may need emergency care. This may mean having symptoms that suggest that your blood pressure is causing a serious heart or blood vessel problem. Your blood pressure may be over 180/110. ? For example, call 911 if:  ? · You have symptoms of a heart attack. These may include:  ¨ Chest pain or pressure, or a strange feeling in the chest.  ¨ Sweating. ¨ Shortness of breath. ¨ Nausea or vomiting.   ¨ Pain, pressure, or a strange feeling in the back, neck, jaw, or upper belly or in one or both shoulders or arms.  ¨ Lightheadedness or sudden weakness. ¨ A fast or irregular heartbeat. ? · You have symptoms of a stroke. These may include:  ¨ Sudden numbness, tingling, weakness, or loss of movement in your face, arm, or leg, especially on only one side of your body. ¨ Sudden vision changes. ¨ Sudden trouble speaking. ¨ Sudden confusion or trouble understanding simple statements. ¨ Sudden problems with walking or balance. ¨ A sudden, severe headache that is different from past headaches. ? · You have severe back or belly pain. ?Do not wait until your blood pressure comes down on its own. Get help right away. ?Call your doctor now or seek immediate care if:  ? · Your blood pressure is much higher than normal (such as 180/110 or higher), but you don't have symptoms. ? · You think high blood pressure is causing symptoms, such as:  ¨ Severe headache. ¨ Blurry vision. ? Watch closely for changes in your health, and be sure to contact your doctor if:  ? · Your blood pressure measures 140/90 or higher at least 2 times. That means the top number is 140 or higher or the bottom number is 90 or higher, or both. ? · You think you may be having side effects from your blood pressure medicine. ? · Your blood pressure is usually normal, but it goes above normal at least 2 times. Where can you learn more? Go to http://pricila-jose.info/. Enter B602 in the search box to learn more about \"High Blood Pressure: Care Instructions. \"  Current as of: September 21, 2016  Content Version: 11.4  © 6296-7439 [employer]. Care instructions adapted under license by [employer] (which disclaims liability or warranty for this information). If you have questions about a medical condition or this instruction, always ask your healthcare professional. [place] [address] any warranty or liability for your use of this information.        Postconcussion Syndrome: Care Instructions  Your Care Instructions    Postconcussion syndrome occurs after a blow to the head or body. Common symptoms are changes in the ability to concentrate, think, remember, or solve problems. Symptoms, which may include headaches, personality changes, and dizziness, may be related to stress from the events surrounding the accident that caused the injury. Follow-up care is a key part of your treatment and safety. Be sure to make and go to all appointments, and call your doctor if you are having problems. It's also a good idea to know your test results and keep a list of the medicines you take. How can you care for yourself at home? Pain  · Rest is the best treatment for postconcussion syndrome. · Do not drive if you have taken a prescription pain medicine. · Rest in a quiet, dark room until your headache is gone. Close your eyes and try to relax or go to sleep. Do not watch TV or read. · Put a cold, moist cloth or cold pack on the painful area for 10 to 20 minutes at a time. Put a thin cloth between the cold pack and your skin. · Have someone gently massage your neck and shoulders. · Take your medicines exactly as prescribed. Call your doctor if you think you are having a problem with your medicine. You will get more details on the specific medicines your doctor prescribes. Stress  · Try to reduce stress. Some ways to do this include:  ¨ Taking slow, deep breaths. ¨ Soaking in a warm bath. ¨ Listening to soothing music. ¨ Taking a yoga class. ¨ Having a massage or back rub. ¨ Drinking a warm, nonalcoholic, noncaffeinated beverage. · Get enough sleep. · Eat a healthy, balanced diet. A balanced diet includes whole grains, dairy, fruits and vegetables, and protein. Eat a variety of foods from each of those groups so you get all the nutrients you need. · Avoid alcohol and illegal drugs. · Try relaxation exercises, such as breathing and muscle relaxation exercises.   · Talk to your doctor about counseling. It may help you deal with stress from your accident. When should you call for help? Watch closely for changes in your health, and be sure to contact your doctor if:  ? · You do not get better as expected. ? · Your symptoms, such as headaches, trouble concentrating, or changes in mood, get worse. Where can you learn more? Go to http://pricila-jose.info/. Enter N727 in the search box to learn more about \"Postconcussion Syndrome: Care Instructions. \"  Current as of: October 14, 2016  Content Version: 11.4  © 7377-7534 [employer]. Care instructions adapted under license by [employer] (which disclaims liability or warranty for this information). If you have questions about a medical condition or this instruction, always ask your healthcare professional. [address] any warranty or liability for your use of this information.

## 2018-02-07 NOTE — ED NOTES
Pt stable, sitting up in bed, HOB 45, pt alert, interactive with family and staff, NAD at this time.

## 2018-03-26 ENCOUNTER — HOSPITAL ENCOUNTER (OUTPATIENT)
Dept: PHYSICAL THERAPY | Age: 78
Discharge: HOME | End: 2018-03-26
Payer: MEDICARE

## 2018-03-26 PROCEDURE — 97140 MANUAL THERAPY 1/> REGIONS: CPT

## 2018-03-26 PROCEDURE — G8982 BODY POS GOAL STATUS: HCPCS

## 2018-03-26 PROCEDURE — 97162 PT EVAL MOD COMPLEX 30 MIN: CPT

## 2018-03-26 PROCEDURE — G8981 BODY POS CURRENT STATUS: HCPCS

## 2018-03-27 NOTE — PROGRESS NOTES
Ambulatory/Rehab Services H2 Model Falls Risk Assessment    Risk Factor Pts. ·   Confusion/Disorientation/Impulsivity  []    4 ·   Symptomatic Depression  []   2 ·   Altered Elimination  []   1 ·   Dizziness/Vertigo  []   1 ·   Gender (Male)  [x]   1 ·   Any administered antiepileptics (anticonvulsants):  []   2 ·   Any administered benzodiazepines:  []   1 ·   Visual Impairment (specify):  []   1 ·   Portable Oxygen Use  []   1 ·   Orthostatic ? BP  []   1 ·   History of Recent Falls (within 3 mos.)  []   5     Ability to Rise from Chair (choose one) Pts. ·   Ability to rise in a single movement  []   0 ·   Pushes up, successful in one attempt  [x]   1 ·   Multiple attempts, but successful  []   3 ·   Unable to rise without assistance  []   4   Total: (5 or greater = High Risk) 2     Falls Prevention Plan:   []                Physical Limitations to Exercise (specify):   []                Mobility Assistance Device (type):   []                Exercise/Equipment Adaptation (specify):    ©2010 [facility] Patent #[id number].  Federal Law prohibits the replication, distribution or use without written permission from [facility]

## 2018-03-27 NOTE — THERAPY EVALUATION
Antionette Laureano  : 1940  [place]  Secondary: Eladio James 74 at [facility]  [address] [place] [address], [place], [address] [place] [address]  [phone]   [phone]       OUTPATIENT PHYSICAL THERAPY:Initial Assessment 3/26/2018    ICD-10: Treatment Diagnosis: M54.5 low back pain and R26.89 other abnormalities of gait and mobility   Precautions: bending and lifting at the waist   Allergies: Citric acid and Lisinopril   Fall Risk Score: 2 (? 5 = High Risk)  MD Orders: evaluate and treat  MEDICAL/REFERRING DIAGNOSIS:  Low back pain [M54.5]   DATE OF ONSET:2017-after his 3rd lumbar surgery  REFERRING PHYSICIAN: Edwin GIRARD,*  RETURN PHYSICIAN APPOINTMENT:2018     INITIAL ASSESSMENT:  Mr. Lazaro Self is a 65 y.o. male presenting to physical therapy with complaints of B low back pain and tightness with radicular symptoms into L lower leg to L foot since he had his 3rd lumbar surgery in 2017-pain level is 3-4/10 with moderate lumbar paraspinal spasm with trigger points in both gluteus medius -flattened lumbar curve -level pelvis and level leg length -ambulates independently with minimal + antalgic gait -trunk range of motion is limited in B sidebending due to increased pain -weakness in B quads and hamstrings makes for falls precautions LE range of motion is wfl -very good candidate for skilled physical therapy to include manual therapy, pain modalities as needed and therapeutic exercises to include  lumbar exercises with LE strengthening  -motivated patient     PROBLEM LIST (Impacting functional limitations):  1. Decreased Strength  2. Decreased ADL/Functional Activities  3. Decreased Transfer Abilities  4. Decreased Ambulation Ability/Technique  5. Decreased Balance  6. Increased Pain  7. Decreased Activity Tolerance  8. Decreased Pacing Skills  9.  Decreased Flexibility/Joint Mobility INTERVENTIONS PLANNED:  1. Cold  2. Electrical Stimulation  3. Gait Training  4. Heat  5. Home Exercise Program (HEP)  6. Manual Therapy  7. Range of Motion (ROM)  8. Therapeutic Exercise/Strengthening   TREATMENT PLAN:  Effective Dates: 3-26-18 to 5-7-18. Frequency/Duration: 2 times a week for 6 weeks  GOALS: (Goals have been discussed and agreed upon with patient.)  Short-Term Functional Goals: Time Frame: 4-7-18  1. Low back pain is less than 3-4/10 to progress to DC goal  2. Decreased lumbar spasm   3. More extended posture  Instruction in exercise program to allow increased ADLs. Discharge Goals: Time Frame:5-7-18  1. Low back pain is 2/10 to allow most home ADLs including light lifting  2. Trunk range of motion is 75% in all ranges to allow increased personal care ability including dressing and washing and in and out of car and driving   3. Independent ambulation with minimal antalgic gait to allow walking community distances  4. Able to sleep at least 6-7 hours without wakened by back pain   5.  able to sit/drive for more than 1-2 hours   6. Outcome measure score is improved from 25/50 to 15/50  Rehabilitation Potential For Stated Goals: Good  Regarding Asiya Mak's therapy, I certify that the treatment plan above will be carried out by a therapist or under their direction. Thank you for this referral,  Elina Bolton PT     Referring Physician Signature: Adrian Marquez,*              Date                    The information in this section was collected on 3-26-18 (except where otherwise noted). HISTORY:   History of Present Injury/Illness (Reason for Referral):  3rd lumbar surgery in July/2017-low back pain with LE weakness and radicular symptoms   Past Medical History/Comorbidities:   Mr. 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 He also has no past medical history of Adverse effect of anesthesia; Difficult intubation; Malignant hyperthermia due to anesthesia; Nausea & vomiting; or Pseudocholinesterase deficiency. Mr. Tesfaye Hilliard  has a past surgical history that includes hx lumbar fusion ([id number]); hx colonoscopy (Dec 2003); hx heart catheterization (2001 and 2003); pr left heart cath,percutaneous (2014); hx cataract removal; hx tonsillectomy; pr sinus surgery proc unlisted; hx heent; hx orthopaedic; hx hernia repair; and hx other surgical (08/2017).   Social History/Living Environment:   Home Environment: Private residence  # Steps to Enter: 1  Rails to Enter: No  Wheelchair Ramp: No  One/Two Story Residence: One story  Living Alone: No  Support Systems: Child(biju)  Patient Expects to be Discharged to[de-identified] Private residence  Current DME Used/Available at Home: None  Tub or Shower Type: Tub/Shower combination  Prior Level of Function/Work/Activity:  Independent with home ADLs but with increased low back pain and LE weakness  Current Medications:       Current Outpatient Prescriptions:     leflunomide (ARAVA) 20 mg tablet, TAKE 1 TABLET EVERY DAY, Disp: 90 Tab, Rfl: 1    metoprolol tartrate (LOPRESSOR) 25 mg tablet, Take 1 Tab by mouth two (2) times a day., Disp: 180 Tab, Rfl: 1    levothyroxine (SYNTHROID) 88 mcg tablet, TAKE 1 TAB BY MOUTH DAILY (BEFORE BREAKFAST). , Disp: 90 Tab, Rfl: 1    losartan (COZAAR) 50 mg tablet, Take 1 Tab by mouth daily (after dinner). , Disp: 90 Tab, Rfl: 1    HYDROcodone-acetaminophen (NORCO) 5-325 mg per tablet, Take 1-2 Tabs by mouth every six (6) hours as needed for Pain. Max Daily Amount: 8 Tabs., Disp: 20 Tab, Rfl: 0    naproxen sodium (ANAPROX DS) 550 mg tablet, Take 1 Tab by mouth two (2) times daily (with meals). , Disp: 20 Tab, Rfl: 0    fluticasone (FLONASE) 50 mcg/actuation nasal spray, 2 Sprays by Both Nostrils route as needed for Rhinitis., Disp: , Rfl:     atorvastatin (LIPITOR) 40 mg tablet, Take 1 Tab by mouth every evening., Disp: 90 Tab, Rfl: 1    clopidogrel (PLAVIX) 75 mg tab, TAKE 1 TABLET EVERY DAY, Disp: 90 Tab, Rfl: 3    nitroglycerin (NITROSTAT) 0.4 mg SL tablet, 1 Tab by SubLINGual route every five (5) minutes as needed for Chest Pain., Disp: 1 Bottle, Rfl: 3    INFLIXIMAB (REMICADE IV), by IntraVENous route. Every 8 weeks, Disp: , Rfl:     aspirin delayed-release 81 mg tablet, Take 81 mg by mouth nightly., Disp: , Rfl:     ranitidine (ZANTAC) 150 mg tablet, Take 150 mg by mouth as needed. , Disp: , Rfl:    Date Last Reviewed:  3/26/2018   Number of Personal Factors/Comorbidities that affect the Plan of Care:  (patient has a history of HTN, chronic pain ,3 lumbar surgeries, CAD, HTN, and GERD) 1-2: MODERATE COMPLEXITY   EXAMINATION:   Observation/Orthostatic Postural Assessment:          Ambulates with very forward head on neck posture with rounded shoulders and flattened lumbar curve   Palpation:         Very tight B lumbar paraspinals and tender B gluteus medius   ROM:         LE range of motion is wfl     Trunk range of motion -    Flexion-75% with tight low back and hamstrings   Extension-40%  B sidebending- 50% with increased low back pain   B rotation I-66% with guarding   Strength:          Ankles are ankles are 4-/5 except 3+/5 in L foot inversion     R quads and hamstrings are 3+ to 4-/5     L quads and hamstrings are 3+/5     R hip flexor is 4-/5     L hip flexor is 3+ to 4-/5   Special Tests:          Negative spring/compression/valsalva/ negative straight leg raise test with tight hamstrings     Neurological Screen:    Radiating pain and tingling from L knee to L foot   Mental Status:        Alert and oriented      Body Structures Involved:  1. Bones  2. Joints  3. Muscles Body Functions Affected:  1. Sensory/Pain  2. Neuromusculoskeletal  3. Movement Related Activities and Participation Affected:  1. Learning and Applying Knowledge  2. General Tasks and Demands  3. Mobility  4. Self Care  5. Domestic Life   Number of elements (examined above) that affect the Plan of Care: 3: MODERATE COMPLEXITY   CLINICAL PRESENTATION:   Presentation: Evolving clinical presentation with changing clinical characteristics: MODERATE COMPLEXITY   CLINICAL DECISION MAKING:   Outcome Measure: Tool Used: Modified Oswestry Low Back Pain Questionnaire  Score:  Initial: 25/50  Most Recent: X/50 (Date: -- )   Interpretation of Score: Each section is scored on a 0-5 scale, 5 representing the greatest disability. The scores of each section are added together for a total score of 50. Score 0 1-10 11-20 21-30 31-40 41-49 50   Modifier CH CI CJ CK CL CM CN     ? Changing and Maintaining Body Position:    [id number] - CURRENT STATUS: CK - 40%-59% impaired, limited or restricted    - GOAL STATUS: CJ - 20%-39% impaired, limited or restricted    - D/C STATUS:  ---------------To be determined---------------    Medical Necessity:   · Patient is expected to demonstrate progress in strength, range of motion, balance and coordination to increase independence with ADLs and improve safety during walking and transfers. · Skilled intervention continues to be required due to B low back pain with LE weakness is affecting function and gait .   Reason for Services/Other Comments:  · Patient continues to require modification of therapeutic interventions to increase complexity of exercises. Use of outcome tool(s) and clinical judgement create a POC that gives a:  (outcome measure illustrates up to 59% impairment ) Questionable prediction of patient's progress: MODERATE COMPLEXITY            TREATMENT:   (In addition to Assessment/Re-Assessment sessions the following treatments were rendered)  Pre-treatment Symptoms/Complaints:  I have had 3 low back surgeries with a lot of metal in my body -I am feeling okay today but it could be different tomorrow   Pain: Initial:   Pain Intensity 1: 3  Pain Location 1: Back, Leg  Pain Orientation 1: Left, Right, Lower  Pain Intervention(s) 1: Shower, Rest  Post Session:  2/10 -decreased low back spasm -tolerated muscle stimulation with heat well      Therapeutic Exercise: ( ): reviewed in exercises and hot/cold modalities and more erect posture x 5 minutes     Exercises per grid below to improve mobility, strength, balance and coordination. Required minimal verbal cues to promote proper body alignment, promote proper body posture and promote proper body mechanics. Progressed resistance, range, repetitions and complexity of movement as indicated. Date:  3-26-18 Date:   Date:     Activity/Exercise Parameters Parameters Parameters   Knee to chest      hamstring      Piriformis stretch      Sit to stand       Corner stretch       Backward shoulder shrugs                Manual Therapy (    Soft Tissue Mobilization Duration  Duration: 14 Minutes to B lumbars and L gluteus medius while inn R sidelying position-effleurage and petrisage for tightness    Therapeutic Modalities: for pain and edema                 Lumbo-Sacral Spine Heat  Type: Moist pack  Duration : 15 minutes  Patient Position: Lying right side                             Lumbo-Sacral Spine Electric Stimulation  Type:  Interferential  Placement: B lumbars   Duration : 15 minutes  Patient Position: Lying right side NO CHARGE FOR MUSCLE STIMULATION  HEP: As above; handouts given to patient for all exercises. Treatment/Session Assessment:    · Response to Treatment: relief with manual therapy and muscle stimulation with heat. · Compliance with Program/Exercises: compliant most of the time. · Recommendations/Intent for next treatment session: \"Next visit will focus on advancements to more challenging activities\". push lumbar rehab and LE strengthening   ·   Total Treatment Duration: manual therapy x 14 minutes /muscle stimulation with heat x 15 minutes/exercises review x 5 minutes /evaluation x 26 minutes -total treatment time was 60 minutes     PT Patient Time In/Time Out  Time In: 1430  Time Out: Hair 1163, PT

## 2018-03-29 ENCOUNTER — APPOINTMENT (OUTPATIENT)
Dept: PHYSICAL THERAPY | Age: 78
End: 2018-03-29

## 2018-04-03 ENCOUNTER — HOSPITAL ENCOUNTER (OUTPATIENT)
Dept: PHYSICAL THERAPY | Age: 78
Discharge: HOME | End: 2018-04-03
Payer: MEDICARE

## 2018-04-03 PROCEDURE — 97110 THERAPEUTIC EXERCISES: CPT

## 2018-04-03 PROCEDURE — 97140 MANUAL THERAPY 1/> REGIONS: CPT

## 2018-04-03 NOTE — PROGRESS NOTES
Janet Whitaker  : 1940  [place]  Secondary: Eladio James 74 at [facility]  [address] [facility], [place], [address] [place] [address]  [phone]   [phone]       OUTPATIENT PHYSICAL THERAPY:Daily Note 4/3/2018    ICD-10: Treatment Diagnosis: M54.5 low back pain and R26.89 other abnormalities of gait and mobility   Precautions: bending and lifting at the waist   Allergies: Citric acid and Lisinopril   Fall Risk Score: 2 (? 5 = High Risk)  MD Orders: evaluate and treat  MEDICAL/REFERRING DIAGNOSIS:  Low back pain [M54.5]   DATE OF ONSET:2017-after his 3rd lumbar surgery  REFERRING PHYSICIAN: Pablo GIRARD,*  RETURN PHYSICIAN APPOINTMENT:2018     INITIAL ASSESSMENT:  Mr. Brandy Johnson is a 65 y.o. male presenting to physical therapy with complaints of B low back pain and tightness with radicular symptoms into L lower leg to L foot since he had his 3rd lumbar surgery in 2017-pain level is 3-4/10 with moderate lumbar paraspinal spasm with trigger points in both gluteus medius -flattened lumbar curve -level pelvis and level leg length -ambulates independently with minimal + antalgic gait -trunk range of motion is limited in B sidebending due to increased pain -weakness in B quads and hamstrings makes for falls precautions LE range of motion is wfl -very good candidate for skilled physical therapy to include manual therapy, pain modalities as needed and therapeutic exercises to include  lumbar exercises with LE strengthening  -motivated patient     PROBLEM LIST (Impacting functional limitations):  1. Decreased Strength  2. Decreased ADL/Functional Activities  3. Decreased Transfer Abilities  4. Decreased Ambulation Ability/Technique  5. Decreased Balance  6. Increased Pain  7. Decreased Activity Tolerance  8. Decreased Pacing Skills  9. Decreased Flexibility/Joint Mobility INTERVENTIONS PLANNED:  1. Cold  2.  Electrical Stimulation  3. Gait Training  4. Heat  5. Home Exercise Program (HEP)  6. Manual Therapy  7. Range of Motion (ROM)  8. Therapeutic Exercise/Strengthening   TREATMENT PLAN:  Effective Dates: 3-26-18 to 5-7-18. Frequency/Duration: 2 times a week for 6 weeks  GOALS: (Goals have been discussed and agreed upon with patient.)  Short-Term Functional Goals: Time Frame: 4-7-18  1. Low back pain is less than 3-4/10 to progress to DC goal  2. Decreased lumbar spasm   3. More extended posture  Instruction in exercise program to allow increased ADLs. Discharge Goals: Time Frame:5-7-18  1. Low back pain is 2/10 to allow most home ADLs including light lifting  2. Trunk range of motion is 75% in all ranges to allow increased personal care ability including dressing and washing and in and out of car and driving   3. Independent ambulation with minimal antalgic gait to allow walking community distances  4. Able to sleep at least 6-7 hours without wakened by back pain   5.  able to sit/drive for more than 1-2 hours   6. Outcome measure score is improved from 25/50 to 15/50  Rehabilitation Potential For Stated Goals: Good  Regarding Jamir Mak's therapy, I certify that the treatment plan above will be carried out by a therapist or under their direction. Thank you for this referral,  Zain Zamudio PTA     Referring Physician Signature: Krish May,*              Date                    The information in this section was collected on 3-26-18 (except where otherwise noted). HISTORY:   History of Present Injury/Illness (Reason for Referral):  3rd lumbar surgery in July/2017-low back pain with LE weakness and radicular symptoms   Past Medical History/Comorbidities:   Mr. Batsheva Mustafa  has a past medical history of Aneurysm ([facility] ASCAD - of the native vessel (6/13/2016); CAD (coronary artery disease); Cancer ([address] Chronic pain; Coagulation disorder ([facility]  Crohn's colitis (Dr Carey Lackey) (8/1/2012); crohns's disease; Disorder of bone and cartilage, unspecified (8/11/2014); Dyslipidemia (6/13/2016); GERD (gastroesophageal reflux disease); History of skin cancer; Hypercholesterolemia; Hypertension; MI (myocardial infarction) ([facility] Obesity; 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 He also has no past medical history of Adverse effect of anesthesia; Difficult intubation; Malignant hyperthermia due to anesthesia; Nausea & vomiting; or Pseudocholinesterase deficiency. Mr. Ramu Bonner  has a past surgical history that includes hx lumbar fusion (1325-3238); hx colonoscopy (Dec 2003); hx heart catheterization (2001 and 2003); pr left heart cath,percutaneous (2014); hx cataract removal; hx tonsillectomy; pr sinus surgery proc unlisted; hx heent; hx orthopaedic; hx hernia repair; and hx other surgical (08/2017). Social History/Living Environment:      Prior Level of Function/Work/Activity:  Independent with home ADLs but with increased low back pain and LE weakness  Current Medications:       Current Outpatient Prescriptions:     leflunomide (ARAVA) 20 mg tablet, TAKE 1 TABLET EVERY DAY, Disp: 90 Tab, Rfl: 1    metoprolol tartrate (LOPRESSOR) 25 mg tablet, Take 1 Tab by mouth two (2) times a day., Disp: 180 Tab, Rfl: 1    levothyroxine (SYNTHROID) 88 mcg tablet, TAKE 1 TAB BY MOUTH DAILY (BEFORE BREAKFAST). , Disp: 90 Tab, Rfl: 1    losartan (COZAAR) 50 mg tablet, Take 1 Tab by mouth daily (after dinner). , Disp: 90 Tab, Rfl: 1    HYDROcodone-acetaminophen (NORCO) 5-325 mg per tablet, Take 1-2 Tabs by mouth every six (6) hours as needed for Pain.  Max Daily Amount: 8 Tabs., Disp: 20 Tab, Rfl: 0    naproxen sodium (ANAPROX DS) 550 mg tablet, Take 1 Tab by mouth two (2) times daily (with meals). , Disp: 20 Tab, Rfl: 0    fluticasone (FLONASE) 50 mcg/actuation nasal spray, 2 Sprays by Both Nostrils route as needed for Rhinitis., Disp: , Rfl:     atorvastatin (LIPITOR) 40 mg tablet, Take 1 Tab by mouth every evening., Disp: 90 Tab, Rfl: 1    clopidogrel (PLAVIX) 75 mg tab, TAKE 1 TABLET EVERY DAY, Disp: 90 Tab, Rfl: 3    nitroglycerin (NITROSTAT) 0.4 mg SL tablet, 1 Tab by SubLINGual route every five (5) minutes as needed for Chest Pain., Disp: 1 Bottle, Rfl: 3    INFLIXIMAB (REMICADE IV), by IntraVENous route. Every 8 weeks, Disp: , Rfl:     aspirin delayed-release 81 mg tablet, Take 81 mg by mouth nightly., Disp: , Rfl:     ranitidine (ZANTAC) 150 mg tablet, Take 150 mg by mouth as needed. , Disp: , Rfl:    Date Last Reviewed:  4/3/2018   Number of Personal Factors/Comorbidities that affect the Plan of Care:  (patient has a history of HTN, chronic pain ,3 lumbar surgeries, CAD, HTN, and GERD) 1-2: MODERATE COMPLEXITY   EXAMINATION:   Observation/Orthostatic Postural Assessment:          Ambulates with very forward head on neck posture with rounded shoulders and flattened lumbar curve   Palpation:         Very tight B lumbar paraspinals and tender B gluteus medius   ROM:         LE range of motion is wfl     Trunk range of motion -    Flexion-75% with tight low back and hamstrings   Extension-40%  B sidebending- 50% with increased low back pain   B rotation I-66% with guarding   Strength:          Ankles are ankles are 4-/5 except 3+/5 in L foot inversion     R quads and hamstrings are 3+ to 4-/5     L quads and hamstrings are 3+/5     R hip flexor is 4-/5     L hip flexor is 3+ to 4-/5   Special Tests:          Negative spring/compression/valsalva/ negative straight leg raise test with tight hamstrings     Neurological Screen:    Radiating pain and tingling from L knee to L foot   Mental Status:        Alert and oriented      Body Structures Involved:  1. Bones  2. Joints  3. Muscles Body Functions Affected:  1. Sensory/Pain  2. Neuromusculoskeletal  3. Movement Related Activities and Participation Affected:  1. Learning and Applying Knowledge  2. General Tasks and Demands  3. Mobility  4. Self Care  5. Domestic Life   Number of elements (examined above) that affect the Plan of Care: 3: MODERATE COMPLEXITY   CLINICAL PRESENTATION:   Presentation: Evolving clinical presentation with changing clinical characteristics: MODERATE COMPLEXITY   CLINICAL DECISION MAKING:   Outcome Measure: Tool Used: Modified Oswestry Low Back Pain Questionnaire  Score:  Initial: 25/50  Most Recent: X/50 (Date: -- )   Interpretation of Score: Each section is scored on a 0-5 scale, 5 representing the greatest disability. The scores of each section are added together for a total score of 50. Score 0 1-10 11-20 21-30 31-40 41-49 50   Modifier CH CI CJ CK CL CM CN     ? Changing and Maintaining Body Position:    [id number] - CURRENT STATUS: CK - 40%-59% impaired, limited or restricted    - GOAL STATUS: CJ - 20%-39% impaired, limited or restricted    - D/C STATUS:  ---------------To be determined---------------    Medical Necessity:   · Patient is expected to demonstrate progress in strength, range of motion, balance and coordination to increase independence with ADLs and improve safety during walking and transfers. · Skilled intervention continues to be required due to B low back pain with LE weakness is affecting function and gait . Reason for Services/Other Comments:  · Patient continues to require modification of therapeutic interventions to increase complexity of exercises.    Use of outcome tool(s) and clinical judgement create a POC that gives a:  (outcome measure illustrates up to 59% impairment ) Questionable prediction of patient's progress: MODERATE COMPLEXITY            TREATMENT:   (In addition to Assessment/Re-Assessment sessions the following treatments were rendered)  Pre-treatment Symptoms/Complaints:  Main C/O is increased back and leg pain with standing and walking community distances. Also reports lower extremity weakness left worse than right     Pain: Initial:   Pain Intensity 1: 3  Pain Location 1: Back, Leg  Pain Orientation 1: Right, Left  Post Session:  2/10      Therapeutic Exercise: (20 Minutes):      Exercises per grid below to improve mobility, strength, balance and coordination. Required minimal verbal cues to promote proper body alignment, promote proper body posture and promote proper body mechanics. Progressed resistance, range, repetitions and complexity of movement as indicated. Date:  3-26-18 Date:  4-3-18 Date:     Activity/Exercise Parameters Parameters Parameters   Knee to chest  4 x 20 sec    hamstring  4 x 15 sec strap    Piriformis stretch  4 x 20 sec    Sit to stand       Corner stretch       Backward shoulder shrugs      Abdominal bracing  X 10        Manual Therapy (    Soft Tissue Mobilization Duration  Duration: 25 Minutes To B lumbosacral region including posterior hip musculature in right side lying ; effleurage,petrissage and acupressure    Therapeutic Modalities: for pain and edema                 Lumbo-Sacral Spine Heat  Type: Moist pack  Duration : 10 minutes  Patient Position: Lying right side                             Lumbo-Sacral Spine Electric Stimulation  Type: Interferential  Placement: lumbar region  Duration : 10 minutes  Patient Position: Lying right side                                                  NO CHARGE FOR MUSCLE STIMULATION  HEP: As above; handouts given to patient for all exercises. Treatment/Session Assessment:    · Response to Treatment: decreased tightness and tenderness after session  · Compliance with Program/Exercises: compliant most of the time. · Recommendations/Intent for next treatment session: \"Next visit will focus on advancements to more challenging activities\".   ·   Total Treatment Duration: 55 minutes     PT Patient Time In/Time Out  Time In: 1330  Time Out: [address], [facility]

## 2018-04-13 ENCOUNTER — HOSPITAL ENCOUNTER (OUTPATIENT)
Dept: PHYSICAL THERAPY | Age: 78
Discharge: HOME | End: 2018-04-13
Payer: MEDICARE

## 2018-04-13 PROCEDURE — 97110 THERAPEUTIC EXERCISES: CPT

## 2018-04-13 PROCEDURE — 97140 MANUAL THERAPY 1/> REGIONS: CPT

## 2018-04-13 NOTE — PROGRESS NOTES
Tevin Villela  : 1940  [place]  Secondary: Eladio James 74 at [facility]  [address] [facility], [facility], [address] [place] [address]  [phone]   [phone]       OUTPATIENT PHYSICAL THERAPY:Daily Note 2018    ICD-10: Treatment Diagnosis: M54.5 low back pain and R26.89 other abnormalities of gait and mobility   Precautions: bending and lifting at the waist   Allergies: Citric acid and Lisinopril   Fall Risk Score: 2 (? 5 = High Risk)  MD Orders: evaluate and treat  MEDICAL/REFERRING DIAGNOSIS:  Low back pain [M54.5]   DATE OF ONSET:2017-after his 3rd lumbar surgery  REFERRING PHYSICIAN: Leona GIRARD,*  RETURN PHYSICIAN APPOINTMENT:2018     INITIAL ASSESSMENT:  Mr. Cuong Sotomayor is a 65 y.o. male presenting to physical therapy with complaints of B low back pain and tightness with radicular symptoms into L lower leg to L foot since he had his 3rd lumbar surgery in 2017-pain level is 3-4/10 with moderate lumbar paraspinal spasm with trigger points in both gluteus medius -flattened lumbar curve -level pelvis and level leg length -ambulates independently with minimal + antalgic gait -trunk range of motion is limited in B sidebending due to increased pain -weakness in B quads and hamstrings makes for falls precautions LE range of motion is wfl -very good candidate for skilled physical therapy to include manual therapy, pain modalities as needed and therapeutic exercises to include  lumbar exercises with LE strengthening  -motivated patient     PROBLEM LIST (Impacting functional limitations):  1. Decreased Strength  2. Decreased ADL/Functional Activities  3. Decreased Transfer Abilities  4. Decreased Ambulation Ability/Technique  5. Decreased Balance  6. Increased Pain  7. Decreased Activity Tolerance  8. Decreased Pacing Skills  9. Decreased Flexibility/Joint Mobility INTERVENTIONS PLANNED:  1. Cold  2.  Electrical Stimulation  3. Gait Training  4. Heat  5. Home Exercise Program (HEP)  6. Manual Therapy  7. Range of Motion (ROM)  8. Therapeutic Exercise/Strengthening   TREATMENT PLAN:  Effective Dates: 3-26-18 to 5-7-18. Frequency/Duration: 2 times a week for 6 weeks  GOALS: (Goals have been discussed and agreed upon with patient.)  Short-Term Functional Goals: Time Frame: 4-7-18  1. Low back pain is less than 3-4/10 to progress to DC goal  2. Decreased lumbar spasm   3. More extended posture  Instruction in exercise program to allow increased ADLs. Discharge Goals: Time Frame:5-7-18  1. Low back pain is 2/10 to allow most home ADLs including light lifting  2. Trunk range of motion is 75% in all ranges to allow increased personal care ability including dressing and washing and in and out of car and driving   3. Independent ambulation with minimal antalgic gait to allow walking community distances  4. Able to sleep at least 6-7 hours without wakened by back pain   5.  able to sit/drive for more than 1-2 hours   6. Outcome measure score is improved from 25/50 to 15/50  Rehabilitation Potential For Stated Goals: Good  Regarding Iglseia File Obdulio's therapy, I certify that the treatment plan above will be carried out by a therapist or under their direction. Thank you for this referral,  Alonzo Spence PTA     Referring Physician Signature: Loren Sterling,*              Date                    The information in this section was collected on 3-26-18 (except where otherwise noted). HISTORY:   History of Present Injury/Illness (Reason for Referral):  3rd lumbar surgery in July/2017-low back pain with LE weakness and radicular symptoms   Past Medical History/Comorbidities:   Mr. Azra Dominique  has a past medical history of Aneurysm ([facility] ASCAD - of the native vessel (6/13/2016); CAD (coronary artery disease); Cancer ([address] Chronic pain; Coagulation disorder ([facility]  Crohn's colitis (Dr Zeenat Curtis) (8/1/2012); crohns's disease; Disorder of bone and cartilage, unspecified (8/11/2014); Dyslipidemia (6/13/2016); GERD (gastroesophageal reflux disease); History of skin cancer; Hypercholesterolemia; Hypertension; MI (my[facility] infarction) ([facility] Obesity; 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 He also has no past medical history of Adverse effect of anesthesia; Difficult intubation; Malignant hyperthermia due to anesthesia; Nausea & vomiting; or Pseudocholinesterase deficiency. Mr. Sid Aguilera  has a past surgical history that includes hx lumbar fusion ([id number]); hx colonoscopy (Dec 2003); hx heart catheterization (2001 and 2003); pr left heart cath,percutaneous (2014); hx cataract removal; hx tonsillectomy; pr sinus surgery proc unlisted; hx heent; hx orthopaedic; hx hernia repair; and hx other surgical (08/2017). Social History/Living Environment:      Prior Level of Function/Work/Activity:  Independent with home ADLs but with increased low back pain and LE weakness  Current Medications:       Current Outpatient Prescriptions:     atorvastatin (LIPITOR) 40 mg tablet, TAKE 1 TABLET EVERY EVENING, Disp: 90 Tab, Rfl: 1    leflunomide (ARAVA) 20 mg tablet, TAKE 1 TABLET EVERY DAY, Disp: 90 Tab, Rfl: 1    metoprolol tartrate (LOPRESSOR) 25 mg tablet, Take 1 Tab by mouth two (2) times a day., Disp: 180 Tab, Rfl: 1    levothyroxine (SYNTHROID) 88 mcg tablet, TAKE 1 TAB BY MOUTH DAILY (BEFORE BREAKFAST). , Disp: 90 Tab, Rfl: 1    losartan (COZAAR) 50 mg tablet, Take 1 Tab by mouth daily (after dinner). , Disp: 90 Tab, Rfl: 1    HYDROcodone-acetaminophen (NORCO) 5-325 mg per tablet, Take 1-2 Tabs by mouth every six (6) hours as needed for Pain. Max Daily Amount: 8 Tabs., Disp: 20 Tab, Rfl: 0    naproxen sodium (ANAPROX DS) 550 mg tablet, Take 1 Tab by mouth two (2) times daily (with meals). , Disp: 20 Tab, Rfl: 0    fluticasone (FLONASE) 50 mcg/actuation nasal spray, 2 Sprays by Both Nostrils route as needed for Rhinitis., Disp: , Rfl:     clopidogrel (PLAVIX) 75 mg tab, TAKE 1 TABLET EVERY DAY, Disp: 90 Tab, Rfl: 3    nitroglycerin (NITROSTAT) 0.4 mg SL tablet, 1 Tab by SubLINGual route every five (5) minutes as needed for Chest Pain., Disp: 1 Bottle, Rfl: 3    INFLIXIMAB (REMICADE IV), by IntraVENous route. Every 8 weeks, Disp: , Rfl:     aspirin delayed-release 81 mg tablet, Take 81 mg by mouth nightly., Disp: , Rfl:     ranitidine (ZANTAC) 150 mg tablet, Take 150 mg by mouth as needed. , Disp: , Rfl:    Date Last Reviewed:  4/13/2018   Number of Personal Factors/Comorbidities that affect the Plan of Care:  (patient has a history of HTN, chronic pain ,3 lumbar surgeries, CAD, HTN, and GERD) 1-2: MODERATE COMPLEXITY   EXAMINATION:   Observation/Orthostatic Postural Assessment:          Ambulates with very forward head on neck posture with rounded shoulders and flattened lumbar curve   Palpation:         Very tight B lumbar paraspinals and tender B gluteus medius   ROM:         LE range of motion is wfl     Trunk range of motion -    Flexion-75% with tight low back and hamstrings   Extension-40%  B sidebending- 50% with increased low back pain   B rotation I-66% with guarding   Strength:          Ankles are ankles are 4-/5 except 3+/5 in L foot inversion     R quads and hamstrings are 3+ to 4-/5     L quads and hamstrings are 3+/5     R hip flexor is 4-/5     L hip flexor is 3+ to 4-/5   Special Tests:          Negative spring/compression/valsalva/ negative straight leg raise test with tight hamstrings     Neurological Screen:    Radiating pain and tingling from L knee to L foot   Mental Status:        Alert and oriented      Body Structures Involved:  1. Bones  2. Joints  3. Muscles Body Functions Affected:  1. Sensory/Pain  2. Neuromusculoskeletal  3. Movement Related Activities and Participation Affected:  1. Learning and Applying Knowledge  2. General Tasks and Demands  3. Mobility  4. Self Care  5. Domestic Life   Number of elements (examined above) that affect the Plan of Care: 3: MODERATE COMPLEXITY   CLINICAL PRESENTATION:   Presentation: Evolving clinical presentation with changing clinical characteristics: MODERATE COMPLEXITY   CLINICAL DECISION MAKING:   Outcome Measure: Tool Used: Modified Oswestry Low Back Pain Questionnaire  Score:  Initial: 25/50  Most Recent: X/50 (Date: -- )   Interpretation of Score: Each section is scored on a 0-5 scale, 5 representing the greatest disability. The scores of each section are added together for a total score of 50. Score 0 1-10 11-20 21-30 31-40 41-49 50   Modifier CH CI CJ CK CL CM CN     ? Changing and Maintaining Body Position:    [id number] - CURRENT STATUS: CK - 40%-59% impaired, limited or restricted    - GOAL STATUS: CJ - 20%-39% impaired, limited or restricted    - D/C STATUS:  ---------------To be determined---------------    Medical Necessity:   · Patient is expected to demonstrate progress in strength, range of motion, balance and coordination to increase independence with ADLs and improve safety during walking and transfers. · Skilled intervention continues to be required due to B low back pain with LE weakness is affecting function and gait . Reason for Services/Other Comments:  · Patient continues to require modification of therapeutic interventions to increase complexity of exercises.    Use of outcome tool(s) and clinical judgement create a POC that gives a:  (outcome measure illustrates up to 59% impairment ) Questionable prediction of patient's progress: MODERATE COMPLEXITY            TREATMENT:   (In addition to Assessment/Re-Assessment sessions the following treatments were rendered)  Pre-treatment Symptoms/Complaints:  Reports some improvement since starting therapy however continues to report back pain,LE weakness and numbness especially with standing still or walking distances     Pain: Initial:   Pain Intensity 1: 2  Pain Location 1: Back, Leg  Pain Orientation 1: Right, Left  Post Session:  1/10      Therapeutic Exercise: (25 Minutes):      Exercises per grid below to improve mobility, strength, balance and coordination. Required minimal verbal cues to promote proper body alignment, promote proper body posture and promote proper body mechanics. Progressed resistance, range, repetitions and complexity of movement as indicated. Date:  3-26-18 Date:  4-3-18 Date:  4-13-18   Activity/Exercise Parameters Parameters Parameters   Knee to chest  4 x 20 sec X 4   hamstring  4 x 15 sec strap Strap x 4   Piriformis stretch  4 x 20 sec X 3   Sit to stand       Corner stretch    X 3   Backward shoulder shrugs      Abdominal bracing  X 10 X 10   SLR   AB bracing 2 x 5   Double knee to chest   Pain free range x 3             Manual Therapy (    Soft Tissue Mobilization Duration  Duration: 15 Minutes To B lumbosacral region including posterior hip musculature in right side lying ; effleurage,petrissage and acupressure    Therapeutic Modalities: for pain and edema                 Lumbo-Sacral Spine Heat  Type: Moist pack  Duration : 10 minutes  Patient Position: Lying right side                             Lumbo-Sacral Spine Electric Stimulation  Type: Interferential  Placement: lumbar region  Duration : 10 minutes  Patient Position: Lying right side                                                  NO CHARGE FOR MUSCLE STIMULATION  HEP: As above; handouts given to patient for all exercises. Treatment/Session Assessment:    · Response to Treatment: decreased tightness and tenderness after session  · Compliance with Program/Exercises: compliant most of the time.   · Recommendations/Intent for next treatment session: \"Next visit will focus on advancements to more challenging activities\".   ·   Total Treatment Duration: 50 minutes     PT Patient Time In/Time Out  Time In: 1520  Time Out: 1620    Keke German, PTA

## 2018-04-16 ENCOUNTER — HOSPITAL ENCOUNTER (OUTPATIENT)
Dept: PHYSICAL THERAPY | Age: 78
Discharge: HOME | End: 2018-04-16
Payer: MEDICARE

## 2018-04-16 PROCEDURE — 97140 MANUAL THERAPY 1/> REGIONS: CPT

## 2018-04-16 PROCEDURE — 97110 THERAPEUTIC EXERCISES: CPT

## 2018-04-16 NOTE — PROGRESS NOTES
Ifrah Heart  : 1940  [place]  Secondary: Eladio James [facility] at [facility]  [address] [place] [address], [place], [address] [place] [address]  [phone]   [phone]       OUTPATIENT PHYSICAL THERAPY:Daily Note 2018    ICD-10: Treatment Diagnosis: M54.5 low back pain and R26.89 other abnormalities of gait and mobility   Precautions: bending and lifting at the waist   Allergies: Citric acid and Lisinopril   Fall Risk Score: 2 (? 5 = High Risk)  MD Orders: evaluate and treat  MEDICAL/REFERRING DIAGNOSIS:  Low back pain [M54.5]   DATE OF ONSET:2017-after his 3rd lumbar surgery  REFERRING PHYSICIAN: Raymundo GIRARD,*  RETURN PHYSICIAN APPOINTMENT:2018     INITIAL ASSESSMENT:  Mr. Gladis Aase is a 65 y.o. male presenting to physical therapy with complaints of B low back pain and tightness with radicular symptoms into L lower leg to L foot since he had his 3rd lumbar surgery in 2017-pain level is 3-4/10 with moderate lumbar paraspinal spasm with trigger points in both gluteus medius -flattened lumbar curve -level pelvis and level leg length -ambulates independently with minimal + antalgic gait -trunk range of motion is limited in B sidebending due to increased pain -weakness in B quads and hamstrings makes for falls precautions LE range of motion is wfl -very good candidate for skilled physical therapy to include manual therapy, pain modalities as needed and therapeutic exercises to include  lumbar exercises with LE strengthening  -motivated patient     PROBLEM LIST (Impacting functional limitations):  1. Decreased Strength  2. Decreased ADL/Functional Activities  3. Decreased Transfer Abilities  4. Decreased Ambulation Ability/Technique  5. Decreased Balance  6. Increased Pain  7. Decreased Activity Tolerance  8. Decreased Pacing Skills  9. Decreased Flexibility/Joint Mobility INTERVENTIONS PLANNED:  1. Cold  2.  Electrical Stimulation  3. Gait Training  4. Heat  5. Home Exercise Program (HEP)  6. Manual Therapy  7. Range of Motion (ROM)  8. Therapeutic Exercise/Strengthening   TREATMENT PLAN:  Effective Dates: 3-26-18 to 5-7-18. Frequency/Duration: 2 times a week for 6 weeks  GOALS: (Goals have been discussed and agreed upon with patient.)  Short-Term Functional Goals: Time Frame: 4-7-18  1. Low back pain is less than 3-4/10 to progress to DC goal  2. Decreased lumbar spasm   3. More extended posture  Instruction in exercise program to allow increased ADLs. Discharge Goals: Time Frame:5-7-18  1. Low back pain is 2/10 to allow most home ADLs including light lifting  2. Trunk range of motion is 75% in all ranges to allow increased personal care ability including dressing and washing and in and out of car and driving   3. Independent ambulation with minimal antalgic gait to allow walking community distances  4. Able to sleep at least 6-7 hours without wakened by back pain   5.  able to sit/drive for more than 1-2 hours   6. Outcome measure score is improved from 25/50 to 15/50  Rehabilitation Potential For Stated Goals: Good  Regarding Cory Franco Obdulio's therapy, I certify that the treatment plan above will be carried out by a therapist or under their direction. Thank you for this referral,  Roro Hilliard, PT     Referring Physician Signature: Chiqui Solorzano,*              Date                    The information in this section was collected on 3-26-18 (except where otherwise noted). HISTORY:   History of Present Injury/Illness (Reason for Referral):  3rd lumbar surgery in July/2017-low back pain with LE weakness and radicular symptoms   Past Medical History/Comorbidities:   Mr. Kwadwo Dietz  has a past medical history of Aneurysm ([facility] ASCAD - of the native vessel (6/13/2016); CAD (coronary artery disease); Cancer ([address] Chronic pain; Coagulation disorder ([facility]  Crohn's colitis (Dr Jay Silverio) (8/1/2012); crohns's disease; Disorder of bone and cartilage, unspecified (8/11/2014); Dyslipidemia (6/13/2016); GERD (gastroesophageal reflux disease); History of skin cancer; Hypercholesterolemia; Hypertension; MI ([facility] infarction) ([facility] Obesity; 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 He also has no past medical history of Adverse effect of anesthesia; Difficult intubation; Malignant hyperthermia due to anesthesia; Nausea & vomiting; or Pseudocholinesterase deficiency. Mr. Ariela Rod  has a past surgical history that includes hx lumbar fusion ([id number]); hx colonoscopy (Dec 2003); hx heart catheterization (2001 and 2003); pr left heart cath,percutaneous (2014); hx cataract removal; hx tonsillectomy; pr sinus surgery proc unlisted; hx heent; hx orthopaedic; hx hernia repair; and hx other surgical (08/2017). Social History/Living Environment:      Prior Level of Function/Work/Activity:  Independent with home ADLs but with increased low back pain and LE weakness  Current Medications:       Current Outpatient Prescriptions:     atorvastatin (LIPITOR) 40 mg tablet, TAKE 1 TABLET EVERY EVENING, Disp: 90 Tab, Rfl: 1    leflunomide (ARAVA) 20 mg tablet, TAKE 1 TABLET EVERY DAY, Disp: 90 Tab, Rfl: 1    metoprolol tartrate (LOPRESSOR) 25 mg tablet, Take 1 Tab by mouth two (2) times a day., Disp: 180 Tab, Rfl: 1    levothyroxine (SYNTHROID) 88 mcg tablet, TAKE 1 TAB BY MOUTH DAILY (BEFORE BREAKFAST). , Disp: 90 Tab, Rfl: 1    losartan (COZAAR) 50 mg tablet, Take 1 Tab by mouth daily (after dinner). , Disp: 90 Tab, Rfl: 1    HYDROcodone-acetaminophen (NORCO) 5-325 mg per tablet, Take 1-2 Tabs by mouth every six (6) hours as needed for Pain.  Max Daily Amount: 8 Tabs., Disp: 20 Tab, Rfl: 0    naproxen sodium (ANAPROX DS) 550 mg tablet, Take 1 Tab by mouth two (2) times daily (with meals). , Disp: 20 Tab, Rfl: 0    fluticasone (FLONASE) 50 mcg/actuation nasal spray, 2 Sprays by Both Nostrils route as needed for Rhinitis., Disp: , Rfl:     clopidogrel (PLAVIX) 75 mg tab, TAKE 1 TABLET EVERY DAY, Disp: 90 Tab, Rfl: 3    nitroglycerin (NITROSTAT) 0.4 mg SL tablet, 1 Tab by SubLINGual route every five (5) minutes as needed for Chest Pain., Disp: 1 Bottle, Rfl: 3    INFLIXIMAB (REMICADE IV), by IntraVENous route. Every 8 weeks, Disp: , Rfl:     aspirin delayed-release 81 mg tablet, Take 81 mg by mouth nightly., Disp: , Rfl:     ranitidine (ZANTAC) 150 mg tablet, Take 150 mg by mouth as needed. , Disp: , Rfl:    Date Last Reviewed:  4/16/2018   Number of Personal Factors/Comorbidities that affect the Plan of Care:  (patient has a history of HTN, chronic pain ,3 lumbar surgeries, CAD, HTN, and GERD) 1-2: MODERATE COMPLEXITY   EXAMINATION:   Observation/Orthostatic Postural Assessment:          Ambulates with very forward head on neck posture with rounded shoulders and flattened lumbar curve   Palpation:         Very tight B lumbar paraspinals and tender B gluteus medius   ROM:         LE range of motion is wfl     Trunk range of motion -    Flexion-75% with tight low back and hamstrings   Extension-40%  B sidebending- 50% with increased low back pain   B rotation I-66% with guarding   Strength:          Ankles are ankles are 4-/5 except 3+/5 in L foot inversion     R quads and hamstrings are 3+ to 4-/5     L quads and hamstrings are 3+/5     R hip flexor is 4-/5     L hip flexor is 3+ to 4-/5   Special Tests:          Negative spring/compression/valsalva/ negative straight leg raise test with tight hamstrings     Neurological Screen:    Radiating pain and tingling from L knee to L foot   Mental Status:        Alert and oriented      Body Structures Involved:  1. Bones  2. Joints  3. Muscles Body Functions Affected:  1. Sensory/Pain  2. Neuromusculoskeletal  3. Movement Related Activities and Participation Affected:  1. Learning and Applying Knowledge  2. General Tasks and Demands  3. Mobility  4. Self Care  5. Domestic Life   Number of elements (examined above) that affect the Plan of Care: 3: MODERATE COMPLEXITY   CLINICAL PRESENTATION:   Presentation: Evolving clinical presentation with changing clinical characteristics: MODERATE COMPLEXITY   CLINICAL DECISION MAKING:   Outcome Measure: Tool Used: Modified Oswestry Low Back Pain Questionnaire  Score:  Initial: 25/50  Most Recent: X/50 (Date: -- )   Interpretation of Score: Each section is scored on a 0-5 scale, 5 representing the greatest disability. The scores of each section are added together for a total score of 50. Score 0 1-10 11-20 21-30 31-40 41-49 50   Modifier CH CI CJ CK CL CM CN     ? Changing and Maintaining Body Position:    [id number] - CURRENT STATUS: CK - 40%-59% impaired, limited or restricted    - GOAL STATUS: CJ - 20%-39% impaired, limited or restricted    - D/C STATUS:  ---------------To be determined---------------    Medical Necessity:   · Patient is expected to demonstrate progress in strength, range of motion, balance and coordination to increase independence with ADLs and improve safety during walking and transfers. · Skilled intervention continues to be required due to B low back pain with LE weakness is affecting function and gait . Reason for Services/Other Comments:  · Patient continues to require modification of therapeutic interventions to increase complexity of exercises.    Use of outcome tool(s) and clinical judgement create a POC that gives a:  (outcome measure illustrates up to 59% impairment ) Questionable prediction of patient's progress: MODERATE COMPLEXITY            TREATMENT:   (In addition to Assessment/Re-Assessment sessions the following treatments were rendered)  Pre-treatment Symptoms/Complaints:  Patient reports moderate pain in right lower back this morning and reports increased pain pain with prolonged walking and standing. Pain: Initial:   Pain Intensity 1: 5  Pain Location 1: Back  Pain Orientation 1: Lower, Right  Post Session:  5/10      Therapeutic Exercise: (30 Minutes):      Exercises per grid below to improve mobility, strength, balance and coordination. Required minimal verbal cues to promote proper body alignment, promote proper body posture and promote proper body mechanics. Progressed resistance, range, repetitions and complexity of movement as indicated. Date:  4-16-18 Date:  4-3-18 Date:  4-13-18   Activity/Exercise Parameters Parameters Parameters   Knee to chest [id number], B 4 x 20 sec X 4   hamstring [id number], B 4 x 15 sec strap Strap x 4   Piriformis stretch [id number], B 4 x 20 sec X 3   Sit to stand  2x5, no UE use     Corner stretch    X 3   Backward shoulder shrugs      Abdominal bracing  X 10 X 10   SLR 3x5, B  AB bracing 2 x 5   Double knee to chest   Pain free range x 3             Manual Therapy (    Soft Tissue Mobilization Duration  Duration: 15 Minutes To right lumbosacral region including posterior hip musculature in left side lying ; effleurage,petrissage and acupressure    Therapeutic Modalities: for pain and edema                 Lumbo-Sacral Spine Heat  Type: Moist pack  Duration : 10 minutes  Patient Position: Lying left side                                                                                HEP: As above; handouts given to patient for all exercises. Treatment/Session Assessment:    · Response to Treatment: Patient tolerated all treatments and exercises and reported no increased in pain following session. Patient reports 0/10 pain at rest and only has pain with standing and walking. · Compliance with Program/Exercises: compliant most of the time.   · Recommendations/Intent for next treatment session: \"Next visit will focus on advancements to more challenging activities\".   ·   Total Treatment Duration: 55 minutes     PT Patient Time In/Time Out  Time In: 0900  Time Out: 1200 MELISSA Vanegas, PT

## 2018-04-19 ENCOUNTER — HOSPITAL ENCOUNTER (OUTPATIENT)
Dept: PHYSICAL THERAPY | Age: 78
Discharge: HOME | End: 2018-04-19
Payer: MEDICARE

## 2018-04-19 PROCEDURE — 97140 MANUAL THERAPY 1/> REGIONS: CPT

## 2018-04-19 PROCEDURE — 97110 THERAPEUTIC EXERCISES: CPT

## 2018-04-19 NOTE — PROGRESS NOTES
Joya Miller  : 1940  [place]  Secondary: Eladio James [facility] at [facility]  [address] [facility], [facility], [address] [place] [address]  [phone]   [phone]       OUTPATIENT PHYSICAL THERAPY:Daily Note 2018    ICD-10: Treatment Diagnosis: M54.5 low back pain and R26.89 other abnormalities of gait and mobility   Precautions: bending and lifting at the waist   Allergies: Citric acid and Lisinopril   Fall Risk Score: 2 (? 5 = High Risk)  MD Orders: evaluate and treat  MEDICAL/REFERRING DIAGNOSIS:  Low back pain [M54.5]   DATE OF ONSET:2017-after his 3rd lumbar surgery  REFERRING PHYSICIAN: Jesenia GIRARD,*  RETURN PHYSICIAN APPOINTMENT:2018     INITIAL ASSESSMENT:  Mr. Kwadwo Dietz is a 65 y.o. male presenting to physical therapy with complaints of B low back pain and tightness with radicular symptoms into L lower leg to L foot since he had his 3rd lumbar surgery in 2017-pain level is 3-4/10 with moderate lumbar paraspinal spasm with trigger points in both gluteus medius -flattened lumbar curve -level pelvis and level leg length -ambulates independently with minimal + antalgic gait -trunk range of motion is limited in B sidebending due to increased pain -weakness in B quads and hamstrings makes for falls precautions LE range of motion is wfl -very good candidate for skilled physical therapy to include manual therapy, pain modalities as needed and therapeutic exercises to include  lumbar exercises with LE strengthening  -motivated patient     PROBLEM LIST (Impacting functional limitations):  1. Decreased Strength  2. Decreased ADL/Functional Activities  3. Decreased Transfer Abilities  4. Decreased Ambulation Ability/Technique  5. Decreased Balance  6. Increased Pain  7. Decreased Activity Tolerance  8. Decreased Pacing Skills  9. Decreased Flexibility/Joint Mobility INTERVENTIONS PLANNED:  1. Cold  2.  Electrical Stimulation  3. Gait Training  4. Heat  5. Home Exercise Program (HEP)  6. Manual Therapy  7. Range of Motion (ROM)  8. Therapeutic Exercise/Strengthening   TREATMENT PLAN:  Effective Dates: 3-26-18 to 5-7-18. Frequency/Duration: 2 times a week for 6 weeks  GOALS: (Goals have been discussed and agreed upon with patient.)  Short-Term Functional Goals: Time Frame: 4-7-18  1. Low back pain is less than 3-4/10 to progress to DC goal  2. Decreased lumbar spasm   3. More extended posture  Instruction in exercise program to allow increased ADLs. Discharge Goals: Time Frame:5-7-18  1. Low back pain is 2/10 to allow most home ADLs including light lifting  2. Trunk range of motion is 75% in all ranges to allow increased personal care ability including dressing and washing and in and out of car and driving   3. Independent ambulation with minimal antalgic gait to allow walking community distances  4. Able to sleep at least 6-7 hours without wakened by back pain   5.  able to sit/drive for more than 1-2 hours   6. Outcome measure score is improved from 25/50 to 15/50  Rehabilitation Potential For Stated Goals: Good  Regarding Alyhsa Mak's therapy, I certify that the treatment plan above will be carried out by a therapist or under their direction. Thank you for this referral,  Philippe Chaney PTA     Referring Physician Signature: Chip Ahumada,*              Date                    The information in this section was collected on 3-26-18 (except where otherwise noted). HISTORY:   History of Present Injury/Illness (Reason for Referral):  3rd lumbar surgery in July/2017-low back pain with LE weakness and radicular symptoms   Past Medical History/Comorbidities:   Mr. 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 He also has no past medical history of Adverse effect of anesthesia; Difficult intubation; Malignant hyperthermia due to anesthesia; Nausea & vomiting; or Pseudocholinesterase deficiency. Mr. Bev Bello  has a past surgical history that includes hx lumbar fusion ([id number]); hx colonoscopy (Dec 2003); hx heart catheterization (2001 and 2003); pr left heart cath,percutaneous (2014); hx cataract removal; hx tonsillectomy; pr sinus surgery proc unlisted; hx heent; hx orthopaedic; hx hernia repair; and hx other surgical (08/2017). Social History/Living Environment:      Prior Level of Function/Work/Activity:  Independent with home ADLs but with increased low back pain and LE weakness  Current Medications:       Current Outpatient Prescriptions:     atorvastatin (LIPITOR) 40 mg tablet, TAKE 1 TABLET EVERY EVENING, Disp: 90 Tab, Rfl: 1    leflunomide (ARAVA) 20 mg tablet, TAKE 1 TABLET EVERY DAY, Disp: 90 Tab, Rfl: 1    metoprolol tartrate (LOPRESSOR) 25 mg tablet, Take 1 Tab by mouth two (2) times a day., Disp: 180 Tab, Rfl: 1    levothyroxine (SYNTHROID) 88 mcg tablet, TAKE 1 TAB BY MOUTH DAILY (BEFORE BREAKFAST). , Disp: 90 Tab, Rfl: 1    losartan (COZAAR) 50 mg tablet, Take 1 Tab by mouth daily (after dinner). , Disp: 90 Tab, Rfl: 1    HYDROcodone-acetaminophen (NORCO) 5-325 mg per tablet, Take 1-2 Tabs by mouth every six (6) hours as needed for Pain. Max Daily Amount: 8 Tabs., Disp: 20 Tab, Rfl: 0    naproxen sodium (ANAPROX DS) 550 mg tablet, Take 1 Tab by mouth two (2) times daily (with meals). , Disp: 20 Tab, Rfl: 0    fluticasone (FLONASE) 50 mcg/actuation nasal spray, 2 Sprays by Both Nostrils route as needed for Rhinitis., Disp: , Rfl:     clopidogrel (PLAVIX) 75 mg tab, TAKE 1 TABLET EVERY DAY, Disp: 90 Tab, Rfl: 3    nitroglycerin (NITROSTAT) 0.4 mg SL tablet, 1 Tab by SubLINGual route every five (5) minutes as needed for Chest Pain., Disp: 1 Bottle, Rfl: 3    INFLIXIMAB (REMICADE IV), by IntraVENous route. Every 8 weeks, Disp: , Rfl:     aspirin delayed-release 81 mg tablet, Take 81 mg by mouth nightly., Disp: , Rfl:     ranitidine (ZANTAC) 150 mg tablet, Take 150 mg by mouth as needed. , Disp: , Rfl:    Date Last Reviewed:  4/19/2018   Number of Personal Factors/Comorbidities that affect the Plan of Care:  (patient has a history of HTN, chronic pain ,3 lumbar surgeries, CAD, HTN, and GERD) 1-2: MODERATE COMPLEXITY   EXAMINATION:   Observation/Orthostatic Postural Assessment:          Ambulates with very forward head on neck posture with rounded shoulders and flattened lumbar curve   Palpation:         Very tight B lumbar paraspinals and tender B gluteus medius   ROM:         LE range of motion is wfl     Trunk range of motion -    Flexion-75% with tight low back and hamstrings   Extension-40%  B sidebending- 50% with increased low back pain   B rotation I-66% with guarding   Strength:          Ankles are ankles are 4-/5 except 3+/5 in L foot inversion     R quads and hamstrings are 3+ to 4-/5     L quads and hamstrings are 3+/5     R hip flexor is 4-/5     L hip flexor is 3+ to 4-/5   Special Tests:          Negative spring/compression/valsalva/ negative straight leg raise test with tight hamstrings     Neurological Screen:    Radiating pain and tingling from L knee to L foot   Mental Status:        Alert and oriented      Body Structures Involved:  1. Bones  2. Joints  3. Muscles Body Functions Affected:  1. Sensory/Pain  2. Neuromusculoskeletal  3. Movement Related Activities and Participation Affected:  1. Learning and Applying Knowledge  2. General Tasks and Demands  3. Mobility  4. Self Care  5. Domestic Life   Number of elements (examined above) that affect the Plan of Care: 3: MODERATE COMPLEXITY   CLINICAL PRESENTATION:   Presentation: Evolving clinical presentation with changing clinical characteristics: MODERATE COMPLEXITY   CLINICAL DECISION MAKING:   Outcome Measure: Tool Used: Modified Oswestry Low Back Pain Questionnaire  Score:  Initial: 25/50  Most Recent: X/50 (Date: -- )   Interpretation of Score: Each section is scored on a 0-5 scale, 5 representing the greatest disability. The scores of each section are added together for a total score of 50. Score 0 1-10 11-20 21-30 31-40 41-49 50   Modifier CH CI CJ CK CL CM CN     ? Changing and Maintaining Body Position:    [id number] - CURRENT STATUS: CK - 40%-59% impaired, limited or restricted    - GOAL STATUS: CJ - 20%-39% impaired, limited or restricted    - D/C STATUS:  ---------------To be determined---------------    Medical Necessity:   · Patient is expected to demonstrate progress in strength, range of motion, balance and coordination to increase independence with ADLs and improve safety during walking and transfers. · Skilled intervention continues to be required due to B low back pain with LE weakness is affecting function and gait . Reason for Services/Other Comments:  · Patient continues to require modification of therapeutic interventions to increase complexity of exercises.    Use of outcome tool(s) and clinical judgement create a POC that gives a:  (outcome measure illustrates up to 59% impairment ) Questionable prediction of patient's progress: MODERATE COMPLEXITY            TREATMENT:   (In addition to Assessment/Re-Assessment sessions the following treatments were rendered)  Pre-treatment Symptoms/Complaints:  Patient states his pain is about the same. States he can only walk 100 to 150 feet before he has to stop due to pain. .. Pain: Initial:   Pain Intensity 1: 5  Pain Location 1: Back  Pain Orientation 1: Lower  Post Session:  5/10      Therapeutic Exercise: (30 Minutes):      Exercises per grid below to improve mobility, strength, balance and coordination. Required minimal verbal cues to promote proper body alignment, promote proper body posture and promote proper body mechanics. Progressed resistance, range, repetitions and complexity of movement as indicated. Date:  4-16-18 Date:  4-19-18 Date:  4-13-18   Activity/Exercise Parameters Parameters Parameters   Knee to chest [id number], B 3 x 20 sec X 4   hamstring [id number], B Strap 5 x 10 sec Strap x 4   Piriformis stretch [id number], B 4 x 20 sec X 3   Sit to stand  2x5, no UE use X 5    Corner stretch   reviewed X 3   Backward shoulder shrugs      Abdominal bracing  X 10 X 10   SLR 3x5, B 1 x 10   3 x 5 with TA AB bracing 2 x 5   Double knee to chest  3 x 20 sec Pain free range x 3   clamshells  2 x 10    Hip abduction  Green band 2 x 10                    Manual Therapy (    Soft Tissue Mobilization Duration  Duration: 15 Minutes To right lumbosacral region including posterior hip musculature in left side lying ; effleurage,petrissage and acupressure    Therapeutic Modalities: for pain and edema                 Lumbo-Sacral Spine Heat  Type: Moist pack  Duration : 10 minutes  Patient Position: Lying left side                                                                                HEP: As above; handouts given to patient for all exercises. Treatment/Session Assessment:    · Response to Treatment: Decreased tenderness after manual therapy. Requires frequent verbal and tactile cues to perform exercises properly. · Compliance with Program/Exercises: compliant most of the time.   · Recommendations/Intent for next treatment session: \"Next visit will focus on advancements to more challenging activities\".   ·   Total Treatment Duration: 55 minutes     PT Patient Time In/Time Out  Time In: 1000  Time Out: 1100    Hermelinda Ramsey PTA

## 2018-04-24 ENCOUNTER — HOSPITAL ENCOUNTER (OUTPATIENT)
Dept: PHYSICAL THERAPY | Age: 78
Discharge: HOME | End: 2018-04-24
Payer: MEDICARE

## 2018-04-24 PROCEDURE — 97140 MANUAL THERAPY 1/> REGIONS: CPT

## 2018-04-24 PROCEDURE — 97110 THERAPEUTIC EXERCISES: CPT

## 2018-04-24 NOTE — PROGRESS NOTES
Dearorin Amaya  : 1940  [place]  Secondary: Eladio James [facility] at [facility]  [address] [facility], [facility], [address]  [phone]   [phone]       OUTPATIENT PHYSICAL THERAPY:Daily Note 2018    ICD-10: Treatment Diagnosis: M54.5 low back pain and R26.89 other abnormalities of gait and mobility   Precautions: bending and lifting at the waist   Allergies: Citric acid and Lisinopril   Fall Risk Score: 2 (? 5 = High Risk)  MD Orders: evaluate and treat  MEDICAL/REFERRING DIAGNOSIS:  Low back pain [M54.5]   DATE OF ONSET:2017-after his 3rd lumbar surgery  REFERRING PHYSICIAN: Tami GIRARD,*  RETURN PHYSICIAN APPOINTMENT:2018     INITIAL ASSESSMENT:  Mr. Davia Felty is a 65 y.o. male presenting to physical therapy with complaints of B low back pain and tightness with radicular symptoms into L lower leg to L foot since he had his 3rd lumbar surgery in 2017-pain level is 3-4/10 with moderate lumbar paraspinal spasm with trigger points in both gluteus medius -flattened lumbar curve -level pelvis and level leg length -ambulates independently with minimal + antalgic gait -trunk range of motion is limited in B sidebending due to increased pain -weakness in B quads and hamstrings makes for falls precautions LE range of motion is wfl -very good candidate for skilled physical therapy to include manual therapy, pain modalities as needed and therapeutic exercises to include  lumbar exercises with LE strengthening  -motivated patient     PROBLEM LIST (Impacting functional limitations):  1. Decreased Strength  2. Decreased ADL/Functional Activities  3. Decreased Transfer Abilities  4. Decreased Ambulation Ability/Technique  5. Decreased Balance  6. Increased Pain  7. Decreased Activity Tolerance  8. Decreased Pacing Skills  9. Decreased Flexibility/Joint Mobility INTERVENTIONS PLANNED:  1. Cold  2.  Electrical Stimulation  3. Gait Training  4. Heat  5. Home Exercise Program (HEP)  6. Manual Therapy  7. Range of Motion (ROM)  8. Therapeutic Exercise/Strengthening   TREATMENT PLAN:  Effective Dates: 3-26-18 to 5-7-18. Frequency/Duration: 2 times a week for 6 weeks  GOALS: (Goals have been discussed and agreed upon with patient.)  Short-Term Functional Goals: Time Frame: 4-7-18  1. Low back pain is less than 3-4/10 to progress to DC goal  2. Decreased lumbar spasm   3. More extended posture  Instruction in exercise program to allow increased ADLs. Discharge Goals: Time Frame:5-7-18  1. Low back pain is 2/10 to allow most home ADLs including light lifting  2. Trunk range of motion is 75% in all ranges to allow increased personal care ability including dressing and washing and in and out of car and driving   3. Independent ambulation with minimal antalgic gait to allow walking community distances  4. Able to sleep at least 6-7 hours without wakened by back pain   5.  able to sit/drive for more than 1-2 hours   6. Outcome measure score is improved from 25/50 to 15/50  Rehabilitation Potential For Stated Goals: Good  Regarding Joanne Mak's therapy, I certify that the treatment plan above will be carried out by a therapist or under their direction. Thank you for this referral,  German Smith PTA     Referring Physician Signature: Nel Rowland,*              Date                    The information in this section was collected on 3-26-18 (except where otherwise noted). HISTORY:   History of Present Injury/Illness (Reason for Referral):  3rd lumbar surgery in July/2017-low back pain with LE weakness and radicular symptoms   Past Medical History/Comorbidities:   Mr. 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 He also has no past medical history of Adverse effect of anesthesia; Difficult intubation; Malignant hyperthermia due to anesthesia; Nausea & vomiting; or Pseudocholinesterase deficiency. Mr. Batsheva Mustafa  has a past surgical history that includes hx lumbar fusion ([id number]); hx colonoscopy (Dec 2003); hx heart catheterization (2001 and 2003); pr left heart cath,percutaneous (2014); hx cataract removal; hx tonsillectomy; pr sinus surgery proc unlisted; hx heent; hx orthopaedic; hx hernia repair; and hx other surgical (08/2017). Social History/Living Environment:      Prior Level of Function/Work/Activity:  Independent with home ADLs but with increased low back pain and LE weakness  Current Medications:       Current Outpatient Prescriptions:     atorvastatin (LIPITOR) 40 mg tablet, TAKE 1 TABLET EVERY EVENING, Disp: 90 Tab, Rfl: 1    leflunomide (ARAVA) 20 mg tablet, TAKE 1 TABLET EVERY DAY, Disp: 90 Tab, Rfl: 1    metoprolol tartrate (LOPRESSOR) 25 mg tablet, Take 1 Tab by mouth two (2) times a day., Disp: 180 Tab, Rfl: 1    levothyroxine (SYNTHROID) 88 mcg tablet, TAKE 1 TAB BY MOUTH DAILY (BEFORE BREAKFAST). , Disp: 90 Tab, Rfl: 1    losartan (COZAAR) 50 mg tablet, Take 1 Tab by mouth daily (after dinner). , Disp: 90 Tab, Rfl: 1    HYDROcodone-acetaminophen (NORCO) 5-325 mg per tablet, Take 1-2 Tabs by mouth every six (6) hours as needed for Pain. Max Daily Amount: 8 Tabs., Disp: 20 Tab, Rfl: 0    naproxen sodium (ANAPROX DS) 550 mg tablet, Take 1 Tab by mouth two (2) times daily (with meals). , Disp: 20 Tab, Rfl: 0    fluticasone (FLONASE) 50 mcg/actuation nasal spray, 2 Sprays by Both Nostrils route as needed for Rhinitis., Disp: , Rfl:     clopidogrel (PLAVIX) 75 mg tab, TAKE 1 TABLET EVERY DAY, Disp: 90 Tab, Rfl: 3    nitroglycerin (NITROSTAT) 0.4 mg SL tablet, 1 Tab by SubLINGual route every five (5) minutes as needed for Chest Pain., Disp: 1 Bottle, Rfl: 3    INFLIXIMAB (REMICADE IV), by IntraVENous route. Every 8 weeks, Disp: , Rfl:     aspirin delayed-release 81 mg tablet, Take 81 mg by mouth nightly., Disp: , Rfl:     ranitidine (ZANTAC) 150 mg tablet, Take 150 mg by mouth as needed. , Disp: , Rfl:    Date Last Reviewed:  4/24/2018   Number of Personal Factors/Comorbidities that affect the Plan of Care:  (patient has a history of HTN, chronic pain ,3 lumbar surgeries, CAD, HTN, and GERD) 1-2: MODERATE COMPLEXITY   EXAMINATION:   Observation/Orthostatic Postural Assessment:          Ambulates with very forward head on neck posture with rounded shoulders and flattened lumbar curve   Palpation:         Very tight B lumbar paraspinals and tender B gluteus medius   ROM:         LE range of motion is wfl     Trunk range of motion -    Flexion-75% with tight low back and hamstrings   Extension-40%  B sidebending- 50% with increased low back pain   B rotation I-66% with guarding   Strength:          Ankles are ankles are 4-/5 except 3+/5 in L foot inversion     R quads and hamstrings are 3+ to 4-/5     L quads and hamstrings are 3+/5     R hip flexor is 4-/5     L hip flexor is 3+ to 4-/5   Special Tests:          Negative spring/compression/valsalva/ negative straight leg raise test with tight hamstrings     Neurological Screen:    Radiating pain and tingling from L knee to L foot   Mental Status:        Alert and oriented      Body Structures Involved:  1. Bones  2. Joints  3. Muscles Body Functions Affected:  1. Sensory/Pain  2. Neuromusculoskeletal  3. Movement Related Activities and Participation Affected:  1. Learning and Applying Knowledge  2. General Tasks and Demands  3. Mobility  4. Self Care  5. Domestic Life   Number of elements (examined above) that affect the Plan of Care: 3: MODERATE COMPLEXITY   CLINICAL PRESENTATION:   Presentation: Evolving clinical presentation with changing clinical characteristics: MODERATE COMPLEXITY   CLINICAL DECISION MAKING:   Outcome Measure: Tool Used: Modified Oswestry Low Back Pain Questionnaire  Score:  Initial: 25/50  Most Recent: X/50 (Date: -- )   Interpretation of Score: Each section is scored on a 0-5 scale, 5 representing the greatest disability. The scores of each section are added together for a total score of 50. Score 0 1-10 11-20 21-30 31-40 41-49 50   Modifier CH CI CJ CK CL CM CN     ? Changing and Maintaining Body Position:    [id number] - CURRENT STATUS: CK - 40%-59% impaired, limited or restricted    - GOAL STATUS: CJ - 20%-39% impaired, limited or restricted    - D/C STATUS:  ---------------To be determined---------------    Medical Necessity:   · Patient is expected to demonstrate progress in strength, range of motion, balance and coordination to increase independence with ADLs and improve safety during walking and transfers. · Skilled intervention continues to be required due to B low back pain with LE weakness is affecting function and gait . Reason for Services/Other Comments:  · Patient continues to require modification of therapeutic interventions to increase complexity of exercises.    Use of outcome tool(s) and clinical judgement create a POC that gives a:  (outcome measure illustrates up to 59% impairment ) Questionable prediction of patient's progress: MODERATE COMPLEXITY            TREATMENT:   (In addition to Assessment/Re-Assessment sessions the following treatments were rendered)  Pre-treatment Symptoms/Complaints:  Patient reports some improvement since starting therapy reporting the pain is a little less, however pain increases significantly walking any distance. .. Pain: Initial:   Pain Intensity 1: 5  Pain Location 1: Back  Pain Orientation 1: Lower  Post Session:  3/10      Therapeutic Exercise: (30 Minutes):      Exercises per grid below to improve mobility, strength, balance and coordination. Required minimal verbal cues to promote proper body alignment, promote proper body posture and promote proper body mechanics. Progressed resistance, range, repetitions and complexity of movement as indicated. Date:  4-16-18 Date:  4-19-18 Date:  4-24-18   Activity/Exercise Parameters Parameters Parameters   Knee to chest [id number], B 3 x 20 sec X 4   hamstring [id number], B Strap 5 x 10 sec Active with towel 2 x 5 each   Piriformis stretch [id number], B 4 x 20 sec X 3   Sit to stand  2x5, no UE use X 5    Corner stretch   reviewed X 5 with difficulty   Backward shoulder shrugs      Abdominal bracing  X 10 X 10 and with knee lifts   SLR 3x5, B 1 x 10   3 x 5 with TA AB bracing 2 x 5   Double knee to chest  3 x 20 sec Pain free range x 3   clamshells  2 x 10 2 x 12   Hip abduction  Green band 2 x 10 Blue band 3 x 10   Alternate knee lifts   Ab bracing 2 x 5 each               Manual Therapy (    Soft Tissue Mobilization Duration  Duration: 25 Minutes Soft tissue mobilization to lumbosacral and hip region in left side lying. Therapeutic Modalities: for pain and edema                 Lumbo-Sacral Spine Heat  Type: Moist pack  Duration : 10 minutes  Patient Position: Lying left side                                                                                HEP: As above; handouts given to patient for all exercises. Treatment/Session Assessment:    · Response to Treatment:  Requires frequent verbal and tactile cues to perform exercises properly.  Encouraged pt to perform exercises at least twice per day and focus on proper tehnique  · Compliance with Program/Exercises: compliant most of the time. · Recommendations/Intent for next treatment session: \"Next visit will focus on advancements to more challenging activities\".   ·   Total Treatment Duration: 65 minutes     PT Patient Time In/Time Out  Time In: 0900  Time Out: [address] [place], PTA

## 2018-04-27 ENCOUNTER — HOSPITAL ENCOUNTER (OUTPATIENT)
Dept: PHYSICAL THERAPY | Age: 78
Discharge: HOME | End: 2018-04-27
Payer: MEDICARE

## 2018-04-27 PROCEDURE — 97140 MANUAL THERAPY 1/> REGIONS: CPT

## 2018-04-27 PROCEDURE — 97110 THERAPEUTIC EXERCISES: CPT

## 2018-04-27 NOTE — PROGRESS NOTES
Ladell Angelucci  : 1940  [place]  Secondary: Eladio James [facility] at [facility]  [address] [facility] [place], [address] [place] [address]  [phone]   [phone]       OUTPATIENT PHYSICAL THERAPY:Daily Note 2018    ICD-10: Treatment Diagnosis: M54.5 low back pain and R26.89 other abnormalities of gait and mobility   Precautions: bending and lifting at the waist   Allergies: Citric acid and Lisinopril   Fall Risk Score: 2 (? 5 = High Risk)  MD Orders: evaluate and treat  MEDICAL/REFERRING DIAGNOSIS:  Low back pain [M54.5]   DATE OF ONSET:2017-after his 3rd lumbar surgery  REFERRING PHYSICIAN: Rosa GIRARD,*  RETURN PHYSICIAN APPOINTMENT:2018     INITIAL ASSESSMENT:  Mr. Michael Arnold is a 65 y.o. male presenting to physical therapy with complaints of B low back pain and tightness with radicular symptoms into L lower leg to L foot since he had his 3rd lumbar surgery in 2017-pain level is 3-4/10 with moderate lumbar paraspinal spasm with trigger points in both gluteus medius -flattened lumbar curve -level pelvis and level leg length -ambulates independently with minimal + antalgic gait -trunk range of motion is limited in B sidebending due to increased pain -weakness in B quads and hamstrings makes for falls precautions LE range of motion is wfl -very good candidate for skilled physical therapy to include manual therapy, pain modalities as needed and therapeutic exercises to include  lumbar exercises with LE strengthening  -motivated patient     PROBLEM LIST (Impacting functional limitations):  1. Decreased Strength  2. Decreased ADL/Functional Activities  3. Decreased Transfer Abilities  4. Decreased Ambulation Ability/Technique  5. Decreased Balance  6. Increased Pain  7. Decreased Activity Tolerance  8. Decreased Pacing Skills  9. Decreased Flexibility/Joint Mobility INTERVENTIONS PLANNED:  1. Cold  2.  Electrical Stimulation  3. Gait Training  4. Heat  5. Home Exercise Program (HEP)  6. Manual Therapy  7. Range of Motion (ROM)  8. Therapeutic Exercise/Strengthening   TREATMENT PLAN:  Effective Dates: 3-26-18 to 5-7-18. Frequency/Duration: 2 times a week for 6 weeks  GOALS: (Goals have been discussed and agreed upon with patient.)  Short-Term Functional Goals: Time Frame: 4-7-18  1. Low back pain is less than 3-4/10 to progress to DC goal  2. Decreased lumbar spasm   3. More extended posture  Instruction in exercise program to allow increased ADLs. Discharge Goals: Time Frame:5-7-18  1. Low back pain is 2/10 to allow most home ADLs including light lifting  2. Trunk range of motion is 75% in all ranges to allow increased personal care ability including dressing and washing and in and out of car and driving   3. Independent ambulation with minimal antalgic gait to allow walking community distances  4. Able to sleep at least 6-7 hours without wakened by back pain   5.  able to sit/drive for more than 1-2 hours   6. Outcome measure score is improved from 25/50 to 15/50  Rehabilitation Potential For Stated Goals: Good  Regarding Chichokala Aquiles Obdulio's therapy, I certify that the treatment plan above will be carried out by a therapist or under their direction. Thank you for this referral,  Hermelinda Ramsey PTA     Referring Physician Signature: Katherin Elizabeth,*              Date                    The information in this section was collected on 3-26-18 (except where otherwise noted). HISTORY:   History of Present Injury/Illness (Reason for Referral):  3rd lumbar surgery in July/2017-low back pain with LE weakness and radicular symptoms   Past Medical History/Comorbidities:   Mr. Lorrie Gonzalez  has a past medical history of Aneurysm ([facility] ASCAD - of the native vessel (6/13/2016); CAD (coronary artery disease); Cancer ([address] Chronic pain; Coagulation disorder ([facility] [facility]  Crohn's colitis ([facility]) (8/1/2012); crohns's disease; Disorder of bone and cartilage, unspecified (8/11/2014); Dyslipidemia (6/13/2016); GERD (gastroesophageal reflux disease); History of skin cancer; Hypercholesterolemia; Hypertension; [facility]) ([facility] Obesity; 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 He also has no past medical history of Adverse effect of anesthesia; Difficult intubation; Malignant hyperthermia due to anesthesia; Nausea & vomiting; or Pseudocholinesterase deficiency. Mr. Koko Crooks  has a past surgical history that includes hx lumbar fusion ([id number]); hx colonoscopy (Dec 2003); hx heart catheterization (2001 and 2003); pr left heart cath,percutaneous (2014); hx cataract removal; hx tonsillectomy; pr sinus surgery proc unlisted; hx heent; hx orthopaedic; hx hernia repair; and hx other surgical (08/2017). Social History/Living Environment:      Prior Level of Function/Work/Activity:  Independent with home ADLs but with increased low back pain and LE weakness  Current Medications:       Current Outpatient Prescriptions:     atorvastatin (LIPITOR) 40 mg tablet, TAKE 1 TABLET EVERY EVENING, Disp: 90 Tab, Rfl: 1    leflunomide (ARAVA) 20 mg tablet, TAKE 1 TABLET EVERY DAY, Disp: 90 Tab, Rfl: 1    metoprolol tartrate (LOPRESSOR) 25 mg tablet, Take 1 Tab by mouth two (2) times a day., Disp: 180 Tab, Rfl: 1    levothyroxine (SYNTHROID) 88 mcg tablet, TAKE 1 TAB BY MOUTH DAILY (BEFORE BREAKFAST). , Disp: 90 Tab, Rfl: 1    losartan (COZAAR) 50 mg tablet, Take 1 Tab by mouth daily (after dinner). , Disp: 90 Tab, Rfl: 1    HYDROcodone-acetaminophen (NORCO) 5-325 mg per tablet, Take 1-2 Tabs by mouth every six (6) hours as needed for Pain. Max Daily Amount: 8 Tabs., Disp: 20 Tab, Rfl: 0    naproxen sodium (ANAPROX DS) 550 mg tablet, Take 1 Tab by mouth two (2) times daily (with meals). , Disp: 20 Tab, Rfl: 0    fluticasone (FLONASE) 50 mcg/actuation nasal spray, 2 Sprays by Both Nostrils route as needed for Rhinitis., Disp: , Rfl:     clopidogrel (PLAVIX) 75 mg tab, TAKE 1 TABLET EVERY DAY, Disp: 90 Tab, Rfl: 3    nitroglycerin (NITROSTAT) 0.4 mg SL tablet, 1 Tab by SubLINGual route every five (5) minutes as needed for Chest Pain., Disp: 1 Bottle, Rfl: 3    INFLIXIMAB (REMICADE IV), by IntraVENous route. Every 8 weeks, Disp: , Rfl:     aspirin delayed-release 81 mg tablet, Take 81 mg by mouth nightly., Disp: , Rfl:     ranitidine (ZANTAC) 150 mg tablet, Take 150 mg by mouth as needed. , Disp: , Rfl:    Date Last Reviewed:  4/27/2018   Number of Personal Factors/Comorbidities that affect the Plan of Care:  (patient has a history of HTN, chronic pain ,3 lumbar surgeries, CAD, HTN, and GERD) 1-2: MODERATE COMPLEXITY   EXAMINATION:   Observation/Orthostatic Postural Assessment:          Ambulates with very forward head on neck posture with rounded shoulders and flattened lumbar curve   Palpation:         Very tight B lumbar paraspinals and tender B gluteus medius   ROM:         LE range of motion is wfl     Trunk range of motion -    Flexion-75% with tight low back and hamstrings   Extension-40%  B sidebending- 50% with increased low back pain   B rotation I-66% with guarding   Strength:          Ankles are ankles are 4-/5 except 3+/5 in L foot inversion     R quads and hamstrings are 3+ to 4-/5     L quads and hamstrings are 3+/5     R hip flexor is 4-/5     L hip flexor is 3+ to 4-/5   Special Tests:          Negative spring/compression/valsalva/ negative straight leg raise test with tight hamstrings     Neurological Screen:    Radiating pain and tingling from L knee to L foot   Mental Status:        Alert and oriented      Body Structures Involved:  1. Bones  2. Joints  3. Muscles Body Functions Affected:  1. Sensory/Pain  2. Neuromusculoskeletal  3. Movement Related Activities and Participation Affected:  1. Learning and Applying Knowledge  2. General Tasks and Demands  3. Mobility  4. Self Care  5. Domestic Life   Number of elements (examined above) that affect the Plan of Care: 3: MODERATE COMPLEXITY   CLINICAL PRESENTATION:   Presentation: Evolving clinical presentation with changing clinical characteristics: MODERATE COMPLEXITY   CLINICAL DECISION MAKING:   Outcome Measure: Tool Used: Modified Oswestry Low Back Pain Questionnaire  Score:  Initial: 25/50  Most Recent: X/50 (Date: -- )   Interpretation of Score: Each section is scored on a 0-5 scale, 5 representing the greatest disability. The scores of each section are added together for a total score of 50. Score 0 1-10 11-20 21-30 31-40 41-49 50   Modifier CH CI CJ CK CL CM CN     ? Changing and Maintaining Body Position:    [id number] - CURRENT STATUS: CK - 40%-59% impaired, limited or restricted    - GOAL STATUS: CJ - 20%-39% impaired, limited or restricted    - D/C STATUS:  ---------------To be determined---------------    Medical Necessity:   · Patient is expected to demonstrate progress in strength, range of motion, balance and coordination to increase independence with ADLs and improve safety during walking and transfers. · Skilled intervention continues to be required due to B low back pain with LE weakness is affecting function and gait . Reason for Services/Other Comments:  · Patient continues to require modification of therapeutic interventions to increase complexity of exercises.    Use of outcome tool(s) and clinical judgement create a POC that gives a:  (outcome measure illustrates up to 59% impairment ) Questionable prediction of patient's progress: MODERATE COMPLEXITY            TREATMENT:   (In addition to Assessment/Re-Assessment sessions the following treatments were rendered)  Pre-treatment Symptoms/Complaints:  Patient reports some improvement since starting therapy however continues to report significant back pain with ambulation and performing ADL'S. .      Pain: Initial:   Pain Intensity 1: 3  Pain Location 1: Back  Pain Orientation 1: Lower  Post Session:  1/10      Therapeutic Exercise: (30 Minutes):      Exercises per grid below to improve mobility, strength, balance and coordination. Required minimal verbal cues to promote proper body alignment, promote proper body posture and promote proper body mechanics. Progressed resistance, range, repetitions and complexity of movement as indicated. Date:  4-27-18 Date:  4-19-18 Date:  4-24-18   Activity/Exercise Parameters Parameters Parameters   Knee to chest 3 x 20 sec 3 x 20 sec X 4   hamstring Strap 4 x 15 sec Strap 5 x 10 sec Active with towel 2 x 5 each   Piriformis stretch 4 x 20 sec 4 x 20 sec X 3   Sit to stand  3 x 5 X 5    Corner stretch  3 x 20 sec reviewed X 5 with difficulty   Backward shoulder shrugs      Abdominal bracing With strengthening exercises X 10 X 10 and with knee lifts   SLR 1 x 10  2 x 5 1 x 10   3 x 5 with TA AB bracing 2 x 5   Double knee to chest  3 x 20 sec Pain free range x 3   clamshells 2 x 10 2 x 10 2 x 12   Hip abduction Blue band 3 x 10 Green band 2 x 10 Blue band 3 x 10   Alternate knee lifts   Ab bracing 2 x 5 each   ambulation 120 feet minimal back pain           Manual Therapy (    Soft Tissue Mobilization Duration  Duration: 15 Minutes Soft tissue mobilization to lumbosacral and hip region in left side lying. Therapeutic Modalities: for pain and edema                                                                                                  HEP: As above; handouts given to patient for all exercises. Treatment/Session Assessment:    · Response to Treatment:  Requires frequent verbal and tactile cues to perform exercises properly.    · Compliance with Program/Exercises: compliant most of the time. · Recommendations/Intent for next treatment session: \"Next visit will focus on advancements to more challenging activities\".   ·   Total Treatment Duration: 50 minutes     PT Patient Time In/Time Out  Time In: 1100  Time Out: 1150    Reddy Orr PTA

## 2018-05-01 ENCOUNTER — HOSPITAL ENCOUNTER (OUTPATIENT)
Dept: PHYSICAL THERAPY | Age: 78
Discharge: HOME | End: 2018-05-01
Payer: MEDICARE

## 2018-05-01 PROCEDURE — 97110 THERAPEUTIC EXERCISES: CPT

## 2018-05-01 NOTE — PROGRESS NOTES
Ruthann Guy  : 1940  [place]  Secondary: Eladio James [facility] at [facility]  [address], [facility] [address] [place] [address]  [phone]   [phone]       OUTPATIENT PHYSICAL [address] Note 2018    ICD-10: Treatment Diagnosis: M54.5 low back pain and R26.89 other abnormalities of gait and mobility   Precautions: bending and lifting at the waist   Allergies: Citric acid and Lisinopril   Fall Risk Score: 2 (? 5 = High Risk)  MD Orders: evaluate and treat  MEDICAL/REFERRING DIAGNOSIS:  Low back pain [M54.5]   DATE OF ONSET:2017-after his 3rd lumbar surgery  REFERRING PHYSICIAN: William GIRARD,*  RETURN PHYSICIAN APPOINTMENT:2018     INITIAL ASSESSMENT:  Mr. Danisha Blum is a 65 y.o. male presenting to physical therapy with complaints of B low back pain and tightness with radicular symptoms into L lower leg to L foot since he had his 3rd lumbar surgery in 2017-pain level is 3-4/10 with moderate lumbar paraspinal spasm with trigger points in both gluteus medius -flattened lumbar curve -level pelvis and level leg length -ambulates independently with minimal + antalgic gait -trunk range of motion is limited in B sidebending due to increased pain -weakness in B quads and hamstrings makes for falls precautions LE range of motion is wfl -very good candidate for skilled physical therapy to include manual therapy, pain modalities as needed and therapeutic exercises to include  lumbar exercises with LE strengthening  -motivated patient     PROBLEM LIST (Impacting functional limitations):  1. Decreased Strength  2. Decreased ADL/Functional Activities  3. Decreased Transfer Abilities  4. Decreased Ambulation Ability/Technique  5. Decreased Balance  6. Increased Pain  7. Decreased Activity Tolerance  8. Decreased Pacing Skills  9. Decreased Flexibility/Joint Mobility INTERVENTIONS PLANNED:  1. Cold  2.  Electrical Stimulation  3. Gait Training  4. Heat  5. Home Exercise Program (HEP)  6. Manual Therapy  7. Range of Motion (ROM)  8. Therapeutic Exercise/Strengthening   TREATMENT PLAN:  Effective Dates: 3-26-18 to 5-7-18. Frequency/Duration: 2 times a week for 6 weeks  GOALS: (Goals have been discussed and agreed upon with patient.)  Short-Term Functional Goals: Time Frame: 4-7-18  1. Low back pain is less than 3-4/10 to progress to DC goal  2. Decreased lumbar spasm   3. More extended posture  Instruction in exercise program to allow increased ADLs. Discharge Goals: Time Frame:5-7-18  1. Low back pain is 2/10 to allow most home ADLs including light lifting  2. Trunk range of motion is 75% in all ranges to allow increased personal care ability including dressing and washing and in and out of car and driving   3. Independent ambulation with minimal antalgic gait to allow walking community distances  4. Able to sleep at least 6-7 hours without wakened by back pain   5.  able to sit/drive for more than 1-2 hours   6. Outcome measure score is improved from 25/50 to 15/50  Rehabilitation Potential For Stated Goals: Good  Regarding Darien Mak's therapy, I certify that the treatment plan above will be carried out by a therapist or under their direction. Thank you for this referral,  Pattie Rai PTA     Referring Physician Signature: Georgia Bell,*              Date                    The information in this section was collected on 3-26-18 (except where otherwise noted). HISTORY:   History of Present Injury/Illness (Reason for Referral):  3rd lumbar surgery in July/2017-low back pain with LE weakness and radicular symptoms   Past Medical History/Comorbidities:   Mr. Koko Crooks  has a past medical history of Aneurysm ([facility] [address] [facility] ASCAD - of the native vessel (6/13/2016); CAD (coronary artery disease); Cancer ([address] Chronic pain; Coagulation disorder (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 He also has no past medical history of Adverse effect of anesthesia; Difficult intubation; Malignant hyperthermia due to anesthesia; Nausea & vomiting; or Pseudocholinesterase deficiency. Mr. Sid Aguilera  has a past surgical history that includes hx lumbar fusion ([id number]); hx colonoscopy (Dec 2003); hx heart catheterization (2001 and 2003); pr left heart cath,percutaneous (2014); hx cataract removal; hx tonsillectomy; pr sinus surgery proc unlisted; hx heent; hx orthopaedic; hx hernia repair; and hx other surgical (08/2017). Social History/Living Environment:      Prior Level of Function/Work/Activity:  Independent with home ADLs but with increased low back pain and LE weakness  Current Medications:       Current Outpatient Prescriptions:     atorvastatin (LIPITOR) 40 mg tablet, TAKE 1 TABLET EVERY EVENING, Disp: 90 Tab, Rfl: 1    leflunomide (ARAVA) 20 mg tablet, TAKE 1 TABLET EVERY DAY, Disp: 90 Tab, Rfl: 1    metoprolol tartrate (LOPRESSOR) 25 mg tablet, Take 1 Tab by mouth two (2) times a day., Disp: 180 Tab, Rfl: 1    levothyroxine (SYNTHROID) 88 mcg tablet, TAKE 1 TAB BY MOUTH DAILY (BEFORE BREAKFAST). , Disp: 90 Tab, Rfl: 1    losartan (COZAAR) 50 mg tablet, Take 1 Tab by mouth daily (after dinner). , Disp: 90 Tab, Rfl: 1    HYDROcodone-acetaminophen (NORCO) 5-325 mg per tablet, Take 1-2 Tabs by mouth every six (6) hours as needed for Pain. Max Daily Amount: 8 Tabs., Disp: 20 Tab, Rfl: 0    naproxen sodium (ANAPROX DS) 550 mg tablet, Take 1 Tab by mouth two (2) times daily (with meals). , Disp: 20 Tab, Rfl: 0    fluticasone (FLONASE) 50 mcg/actuation nasal spray, 2 Sprays by Both Nostrils route as needed for Rhinitis., Disp: , Rfl:     clopidogrel (PLAVIX) 75 mg tab, TAKE 1 TABLET EVERY DAY, Disp: 90 Tab, Rfl: 3    nitroglycerin (NITROSTAT) 0.4 mg SL tablet, 1 Tab by SubLINGual route every five (5) minutes as needed for Chest Pain., Disp: 1 Bottle, Rfl: 3    INFLIXIMAB (REMICADE IV), by IntraVENous route. Every 8 weeks, Disp: , Rfl:     aspirin delayed-release 81 mg tablet, Take 81 mg by mouth nightly., Disp: , Rfl:     ranitidine (ZANTAC) 150 mg tablet, Take 150 mg by mouth as needed. , Disp: , Rfl:    Date Last Reviewed:  5/1/2018   Number of Personal Factors/Comorbidities that affect the Plan of Care:  (patient has a history of HTN, chronic pain ,3 lumbar surgeries, CAD, HTN, and GERD) 1-2: MODERATE COMPLEXITY   EXAMINATION:   Observation/Orthostatic Postural Assessment:          Ambulates with very forward head on neck posture with rounded shoulders and flattened lumbar curve   Palpation:         Very tight B lumbar paraspinals and tender B gluteus medius   ROM:         LE range of motion is wfl     Trunk range of motion -    Flexion-75% with tight low back and hamstrings   Extension-40%  B sidebending- 50% with increased low back pain   B rotation I-66% with guarding   Strength:          Ankles are ankles are 4-/5 except 3+/5 in L foot inversion     R quads and hamstrings are 3+ to 4-/5     L quads and hamstrings are 3+/5     R hip flexor is 4-/5     L hip flexor is 3+ to 4-/5   Special Tests:          Negative spring/compression/valsalva/ negative straight leg raise test with tight hamstrings     Neurological Screen:    Radiating pain and tingling from L knee to L foot   Mental Status:        Alert and oriented      Body Structures Involved:  1. Bones  2. Joints  3. Muscles Body Functions Affected:  1. Sensory/Pain  2. Neuromusculoskeletal  3. Movement Related Activities and Participation Affected:  1. Learning and Applying Knowledge  2. General Tasks and Demands  3. Mobility  4. Self Care  5. Domestic Life   Number of elements (examined above) that affect the Plan of Care: 3: MODERATE COMPLEXITY   CLINICAL PRESENTATION:   Presentation: Evolving clinical presentation with changing clinical characteristics: MODERATE COMPLEXITY   CLINICAL DECISION MAKING:   Outcome Measure: Tool Used: Modified Oswestry Low Back Pain Questionnaire  Score:  Initial: 25/50  Most Recent: X/50 (Date: -- )   Interpretation of Score: Each section is scored on a 0-5 scale, 5 representing the greatest disability. The scores of each section are added together for a total score of 50. Score 0 1-10 11-20 21-30 31-40 41-49 50   Modifier CH CI CJ CK CL CM CN     ? Changing and Maintaining Body Position:    [id number] - CURRENT STATUS: CK - 40%-59% impaired, limited or restricted    - GOAL STATUS: CJ - 20%-39% impaired, limited or restricted    - D/C STATUS:  ---------------To be determined---------------    Medical Necessity:   · Patient is expected to demonstrate progress in strength, range of motion, balance and coordination to increase independence with ADLs and improve safety during walking and transfers. · Skilled intervention continues to be required due to B low back pain with LE weakness is affecting function and gait . Reason for Services/Other Comments:  · Patient continues to require modification of therapeutic interventions to increase complexity of exercises.    Use of outcome tool(s) and clinical judgement create a POC that gives a:  (outcome measure illustrates up to 59% impairment ) Questionable prediction of patient's progress: MODERATE COMPLEXITY            TREATMENT:   (In addition to Assessment/Re-Assessment sessions the following treatments were rendered)  Pre-treatment Symptoms/Complaints:  Patient reports pain is about the same. continues to C/O significant back pain after standing 5-10  Minutes or walking greater than 100 yards. .      Pain: Initial:   Pain Intensity 1: 4  Pain Location 1: Back  Pain Orientation 1: Lower  Post Session:  1/10      Therapeutic Exercise: (40 Minutes):      Exercises per grid below to improve mobility, strength, balance and coordination. Required minimal verbal cues to promote proper body alignment, promote proper body posture and promote proper body mechanics. Progressed resistance, range, repetitions and complexity of movement as indicated. Date:  4-27-18 Date:  5-1-18 Date:  4-24-18   Activity/Exercise Parameters Parameters Parameters   Knee to chest 3 x 20 sec 3 x 20 sec X 4   hamstring Strap 4 x 15 sec Strap 3 x 20 sec Active with towel 2 x 5 each   Piriformis stretch 4 x 20 sec 3 x 20 sec X 3   Sit to stand  3 x 5 1 x 10  1 x 5    Corner stretch  3 x 20 sec  X 5 with difficulty   Backward shoulder shrugs      Abdominal bracing With strengthening exercises With strengthening exercises X 10 and with knee lifts   SLR 1 x 10  2 x 5 2 x 10 AB bracing 2 x 5   Double knee to chest  X 2 Pain free range x 3   clamshells 2 x 10 2 x 10 2 x 12   Hip abduction Blue band 3 x 10 Black band 3 x 10 Blue band 3 x 10   Alternate knee lifts   Ab bracing 2 x 5 each   ambulation 120 feet minimal back pain     NU step  10 minutes level 3                      Manual Therapy (      .    Therapeutic Modalities: for pain and edema                 Lumbo-Sacral Spine Heat  Type: Moist pack  Duration : 10 minutes  Patient Position: Lying left side                                                                                HEP: As above; handouts given to patient for all exercises. Treatment/Session Assessment:    · Response to Treatment:  Requires frequent verbal and tactile cues to perform exercises properly.    · Compliance with Program/Exercises: compliant most of the time. · Recommendations/Intent for next treatment session: \"Next visit will focus on advancements to more challenging activities\".   ·   Total Treatment Duration: 50 minutes     PT Patient Time In/Time Out  Time In: 1430  Time Out: 1530    Elsie Murdock, PTA

## 2018-05-07 ENCOUNTER — HOSPITAL ENCOUNTER (OUTPATIENT)
Dept: PHYSICAL THERAPY | Age: 78
Discharge: HOME | End: 2018-05-07
Payer: MEDICARE

## 2018-05-07 PROCEDURE — G8981 BODY POS CURRENT STATUS: HCPCS

## 2018-05-07 PROCEDURE — 97140 MANUAL THERAPY 1/> REGIONS: CPT

## 2018-05-07 PROCEDURE — G8982 BODY POS GOAL STATUS: HCPCS

## 2018-05-07 PROCEDURE — 97110 THERAPEUTIC EXERCISES: CPT

## 2018-05-07 NOTE — THERAPY RECERTIFICATION
Jaky Adamson  : 1940  [place]  Secondary: Eladio James 74 at [facility]  [address] [place] [address], [place], [address] [place] [address]  [phone]   [phone]       OUTPATIENT PHYSICAL THERAPY:Daily Note, Progress Report and Recertification [id number]    ICD-10: Treatment Diagnosis: M54.5 low back pain and R26.89 other abnormalities of gait and mobility   Precautions: bending and lifting at the waist   Allergies: Citric acid and Lisinopril   Fall Risk Score: 2 (? 5 = High Risk)  MD Orders: evaluate and treat  MEDICAL/REFERRING DIAGNOSIS:  Low back pain [M54.5]   DATE OF ONSET:2017-after his 3rd lumbar surgery  REFERRING PHYSICIAN: Jean Pierre GIRARD,*  RETURN PHYSICIAN APPOINTMENT:2018     INITIAL ASSESSMENT:  Mr. Yohana Green is a 65 y.o. male presenting to physical therapy with complaints of B low back pain and tightness with radicular symptoms into L lower leg to L foot since he had his 3rd lumbar surgery in 2017-pain level is 3-4/10 with moderate lumbar paraspinal spasm with trigger points in both gluteus medius -flattened lumbar curve -level pelvis and level leg length -ambulates independently with minimal + antalgic gait -trunk range of motion is limited in B sidebending due to increased pain -weakness in B quads and hamstrings makes for falls precautions LE range of motion is wfl -very good candidate for skilled physical therapy to include manual therapy, pain modalities as needed and therapeutic exercises to include  lumbar exercises with LE strengthening  -motivated patient     PROBLEM LIST (Impacting functional limitations):  1. Decreased Strength  2. Decreased ADL/Functional Activities  3. Decreased Transfer Abilities  4. Decreased Ambulation Ability/Technique  5. Decreased Balance  6. Increased Pain  7. Decreased Activity Tolerance  8. Decreased Pacing Skills  9.  Decreased Flexibility/Joint Mobility INTERVENTIONS PLANNED:  1. Cold  2. Electrical Stimulation  3. Gait Training  4. Heat  5. Home Exercise Program (HEP)  6. Manual Therapy  7. Range of Motion (ROM)  8. Therapeutic Exercise/Strengthening   TREATMENT PLAN:  Effective Dates: 5-14-18 to 6-28-18  Frequency/Duration: 1 times a week for 6 weeks  GOALS: (Goals have been discussed and agreed upon with patient.)  Short-Term Functional Goals: Time Frame: 4-7-18  1. Low back pain is less than 3-4/10 to progress to DC goal-ONGOING  2. Decreased lumbar spasm -GOAL MET  3. More extended posture-ONGOING  Instruction in exercise program to allow increased ADLs. -GOAL MET  Discharge Goals: Time Frame:6-28-18  1. Low back pain is 2/10 to allow most home ADLs including light lifting-ONGOING  2. Trunk range of motion is 75% in all ranges to allow increased personal care ability including dressing and washing and in and out of car and driving -GOAL MET  3. Independent ambulation with minimal antalgic gait to allow walking community distances-GOAL MET  4. Able to sleep at least 6-7 hours without wakened by back pain -GOAL MET  5.  able to sit/drive for more than 1-2 hours -ONGOING  6.  Outcome measure score is improved from 25/50 to 15/07-[id number] SCORE IS 18/50    Patient has been seen for 9 visits of low back rehab from 3-26-18 to 5-7-18 with fair progress up until this weekend and feels he may have over done things and his pain is as high as 8/10  prior to therapy on 5-7-18-pain as low as 3/10 with decreased lumbar spasm -LE strength is 3+ to 4-/5 in quads -independent ambulation with minimal antalgic gait -more erect posture with cues-flattened lumbar curve from numerous lumbar surgeries-independent with increased low back and LE exercises -motivated patient -feel he may benefit from a TENS unit for chronic low back pain as patient states he needs to be active caring for his wife who has Parkinsons and maybe early dementia per patient-recertify patient 1 x week x 6 weeks for low back pain relief and LE strengthening with more erect posture from 5-14-18 to 6-28-18-pleasant  gentleman to work with      Rehabilitation Potential For Stated Goals: Good  Regarding Alicia Dana therapy, I certify that the treatment plan above will be carried out by a therapist or under their direction. Thank you for this referral,  Tom Light PT     Referring Physician Signature: Mike Gigi,*              Date                    The information in this section was collected on 3-26-18 (except where otherwise noted). HISTORY:   History of Present Injury/Illness (Reason for Referral):  3rd lumbar surgery in July/2017-low back pain with LE weakness and radicular symptoms   Past Medical History/Comorbidities:   Mr. 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 He also has no past medical history of Adverse effect of anesthesia; Difficult intubation; Malignant hyperthermia due to anesthesia; Nausea & vomiting; or Pseudocholinesterase deficiency.   Mr. Rosette Shukla  has a past surgical history that includes hx lumbar fusion ([id number]); hx colonoscopy (Dec 2003); hx heart catheterization (2001 and 2003); pr left heart cath,percutaneous (2014); hx cataract removal; hx tonsillectomy; pr sinus surgery proc unlisted; hx heent; hx orthopaedic; hx hernia repair; and hx other surgical (08/2017). Social History/Living Environment:      Prior Level of Function/Work/Activity:  Independent with home ADLs but with increased low back pain and LE weakness  Current Medications:       Current Outpatient Prescriptions:     atorvastatin (LIPITOR) 40 mg tablet, TAKE 1 TABLET EVERY EVENING, Disp: 90 Tab, Rfl: 1    leflunomide (ARAVA) 20 mg tablet, TAKE 1 TABLET EVERY DAY, Disp: 90 Tab, Rfl: 1    metoprolol tartrate (LOPRESSOR) 25 mg tablet, Take 1 Tab by mouth two (2) times a day., Disp: 180 Tab, Rfl: 1    levothyroxine (SYNTHROID) 88 mcg tablet, TAKE 1 TAB BY MOUTH DAILY (BEFORE BREAKFAST). , Disp: 90 Tab, Rfl: 1    losartan (COZAAR) 50 mg tablet, Take 1 Tab by mouth daily (after dinner). , Disp: 90 Tab, Rfl: 1    HYDROcodone-acetaminophen (NORCO) 5-325 mg per tablet, Take 1-2 Tabs by mouth every six (6) hours as needed for Pain. Max Daily Amount: 8 Tabs., Disp: 20 Tab, Rfl: 0    naproxen sodium (ANAPROX DS) 550 mg tablet, Take 1 Tab by mouth two (2) times daily (with meals). , Disp: 20 Tab, Rfl: 0    fluticasone (FLONASE) 50 mcg/actuation nasal spray, 2 Sprays by Both Nostrils route as needed for Rhinitis., Disp: , Rfl:     clopidogrel (PLAVIX) 75 mg tab, TAKE 1 TABLET EVERY DAY, Disp: 90 Tab, Rfl: 3    nitroglycerin (NITROSTAT) 0.4 mg SL tablet, 1 Tab by SubLINGual route every five (5) minutes as needed for Chest Pain., Disp: 1 Bottle, Rfl: 3    INFLIXIMAB (REMICADE IV), by IntraVENous route. Every 8 weeks, Disp: , Rfl:     aspirin delayed-release 81 mg tablet, Take 81 mg by mouth nightly., Disp: , Rfl:     ranitidine (ZANTAC) 150 mg tablet, Take 150 mg by mouth as needed. , Disp: , Rfl:    Date Last Reviewed:  5/7/2018   Number of Personal Factors/Comorbidities that affect the Plan of Care:  (patient has a history of HTN, chronic pain ,3 lumbar surgeries, CAD, HTN, and GERD) 1-2: MODERATE COMPLEXITY   EXAMINATION:   Observation/Orthostatic Postural Assessment:          Ambulates with less forward head on neck posture with rounded shoulders and flattened lumbar curve   Palpation:         Very tight B lumbar paraspinals and tender B gluteus medius are decreased   ROM:         LE range of motion is wfl     Trunk range of motion -    Flexion-80% with tight low back and hamstrings   Extension-40% with right side pain   B sidebending- 66% with increased R  low back pain   B rotation I-66% with guarding to the R    Strength: Ankles are ankles are 4-/5 except 3+/5 in L foot inversion     R quads and hamstrings are 3+ to 4-/5     L quads and hamstrings are 3+ to 4-/5     R hip flexor is 4-/5     L hip flexor is 3+ to 4-/5     Special Tests:          Negative spring/compression/valsalva/ negative straight leg raise test with tight hamstrings     Neurological Screen:    Radiating pain and tingling from L knee to L foot   Mental Status:        Alert and oriented      Body Structures Involved:  1. Bones  2. Joints  3. Muscles Body Functions Affected:  1. Sensory/Pain  2. Neuromusculoskeletal  3. Movement Related Activities and Participation Affected:  1. Learning and Applying Knowledge  2. General Tasks and Demands  3. Mobility  4. Self Care  5. Domestic Life   Number of elements (examined above) that affect the Plan of Care: 3: MODERATE COMPLEXITY   CLINICAL PRESENTATION:   Presentation: Evolving clinical presentation with changing clinical characteristics: MODERATE COMPLEXITY   CLINICAL DECISION MAKING:   Outcome Measure: Tool Used: Modified Oswestry Low Back Pain Questionnaire  Score:  Initial: 25/50  Most Recent: 1850 (Date: 5-7-18-- )   Interpretation of Score: Each section is scored on a 0-5 scale, 5 representing the greatest disability. The scores of each section are added together for a total score of 50.     Score 0 1-10 11-20 21-30 31-40 41-49 50   Modifier CH CI CJ CK CL CM CN     Changing and Maintaining Body Position:     - CURRENT STATUS: CJ - 20%-39% impaired, limited or restricted    - GOAL STATUS: CJ - 20%-39% impaired, limited or restricted  ?  - D/C STATUS:  ---------------To be determined---------------       :  Medical Necessity:   · Patient is expected to demonstrate progress in strength, range of motion, balance and coordination to increase independence with ADLs and improve safety during walking and transfers. · Skilled intervention continues to be required due to B low back pain with LE weakness is affecting function and gait . Reason for Services/Other Comments:  · Patient continues to require modification of therapeutic interventions to increase complexity of exercises. Use of outcome tool(s) and clinical judgement create a POC that gives a:  (outcome measure illustrates up to 59% impairment ) Questionable prediction of patient's progress: MODERATE COMPLEXITY            TREATMENT:   (In addition to Assessment/Re-Assessment sessions the following treatments were rendered)  Pre-treatment Symptoms/Complaints:  Patient reports pain is increased in low back this morning to 8/10 -able to use cart at store with shopping and standing as he does the cooking and shopping. Pain: Initial:   Pain Intensity 1: 8  Pain Location 1: Back  Pain Orientation 1: Lower, Left, Right  Pain Intervention(s) 1: Exercise, Heat applied  Post Session:  3/10 -relief with manual therapy and muscle stimulation     Therapeutic Exercise: ( ): x 20 minutes     Exercises per grid below to improve mobility, strength, balance and coordination. Required minimal verbal cues to promote proper body alignment, promote proper body posture and promote proper body mechanics. Progressed resistance, range, repetitions and complexity of movement as indicated.    Date:  4-27-18 Date:  5-1-18 Date:  5-7-18   Activity/Exercise Parameters Parameters Parameters   Knee to chest 3 x 20 sec 3 x 20 sec X 2 minutes with towel    hamstring Strap 4 x 15 sec Strap 3 x 20 sec X 2 minutes with belt    Piriformis stretch 4 x 20 sec 3 x 20 sec X 2 minutes    Sit to stand  3 x 5 1 x 10  1 x 5    Corner stretch  3 x 20 sec     Backward shoulder shrugs      Abdominal bracing With strengthening exercises With strengthening exercises 2 x 10 with 3 second hold    SLR 1 x 10  2 x 5 2 x 10    Double knee to chest  X 2    clamshells 2 x 10 2 x 10    Hip abduction Blue band 3 x 10 Black band 3 x 10 Black band 2 x 10   Alternate knee lifts      ambulation 120 feet minimal back pain     NU step  10 minutes level 3    Gluteal sets   2 x 10 with 3 second hold   Hip adduction   2 x 10 with yellow theraball         Manual Therapy (    Soft Tissue Mobilization Duration  Duration: 25 Minutes to B lumbars and upper gluteals while in R sidelying position-effleurage and petrisage for tightness. Level pelvis    Therapeutic Modalities: for pain and edema                                               Lumbo-Sacral Spine Electric Stimulation  Type: Interferential  Placement: B lumbars   Duration : 15 minutes  Patient Position: Lying right side                                                  NO CHARGE FOR MUSCLE STIMULATION    HEP: As above; handouts given to patient for all exercises. Treatment/Session Assessment:    · Response to Treatment:  Relief with manual therapy and electrical stimulation -good candidate for Tens unit -instructed patient in possible on line purchase   · Compliance with Program/Exercises: compliant most of the time. · Recommendations/Intent for next treatment session: \"Next visit will focus on advancements to more challenging activities\". push low back rehab and LE strengthening x 1 x week x 6 weeks from 5-14-18 to 6-28-18  ·   Total Treatment Duration: 60 minutes     PT Patient Time In/Time Out  Time In: 0900  Time Out: 102 [facility],

## 2018-05-18 ENCOUNTER — HOSPITAL ENCOUNTER (OUTPATIENT)
Dept: PHYSICAL THERAPY | Age: 78
Discharge: HOME | End: 2018-05-18
Payer: MEDICARE

## 2018-05-18 PROCEDURE — 97140 MANUAL THERAPY 1/> REGIONS: CPT

## 2018-05-18 PROCEDURE — 97110 THERAPEUTIC EXERCISES: CPT

## 2018-05-18 NOTE — PROGRESS NOTES
Kia Mitchell  : 1940  [place]  Secondary: Eladio James [address] at [address], [place], [address] [place] [address]  [phone]   [phone]       OUTPATIENT PHYSICAL THERAPY:Daily Note 2018    ICD-10: Treatment Diagnosis: M54.5 low back pain and R26.89 other abnormalities of gait and mobility   Precautions: bending and lifting at the waist   Allergies: Citric acid and Lisinopril   Fall Risk Score: 2 (? 5 = High Risk)  MD Orders: evaluate and treat  MEDICAL/REFERRING DIAGNOSIS:  Low back pain [M54.5]   DATE OF ONSET:2017-after his 3rd lumbar surgery  REFERRING PHYSICIAN: Geena GIRARD,*  RETURN PHYSICIAN APPOINTMENT:2018     INITIAL ASSESSMENT:  Mr. Shonna Hartley is a 65 y.o. male presenting to physical therapy with complaints of B low back pain and tightness with radicular symptoms into L lower leg to L foot since he had his 3rd lumbar surgery in 2017-pain level is 3-4/10 with moderate lumbar paraspinal spasm with trigger points in both gluteus medius -flattened lumbar curve -level pelvis and level leg length -ambulates independently with minimal + antalgic gait -trunk range of motion is limited in B sidebending due to increased pain -weakness in B quads and hamstrings makes for falls precautions LE range of motion is wfl -very good candidate for skilled physical therapy to include manual therapy, pain modalities as needed and therapeutic exercises to include  lumbar exercises with LE strengthening  -motivated patient     PROBLEM LIST (Impacting functional limitations):  1. Decreased Strength  2. Decreased ADL/Functional Activities  3. Decreased Transfer Abilities  4. Decreased Ambulation Ability/Technique  5. Decreased Balance  6. Increased Pain  7. Decreased Activity Tolerance  8. Decreased Pacing Skills  9. Decreased Flexibility/Joint Mobility INTERVENTIONS PLANNED:  1. Cold  2.  Electrical Stimulation  3. Gait Training  4. Heat  5. Home Exercise Program (HEP)  6. Manual Therapy  7. Range of Motion (ROM)  8. Therapeutic Exercise/Strengthening   TREATMENT PLAN:  Effective Dates: 5-14-18 to 6-28-18  Frequency/Duration: 1 times a week for 6 weeks  GOALS: (Goals have been discussed and agreed upon with patient.)  Short-Term Functional Goals: Time Frame: 4-7-18  1. Low back pain is less than 3-4/10 to progress to DC goal-ONGOING  2. Decreased lumbar spasm -GOAL MET  3. More extended posture-ONGOING  Instruction in exercise program to allow increased ADLs. -GOAL MET  Discharge Goals: Time Frame:6-28-18  1. Low back pain is 2/10 to allow most home ADLs including light lifting-ONGOING  2. Trunk range of motion is 75% in all ranges to allow increased personal care ability including dressing and washing and in and out of car and driving -GOAL MET  3. Independent ambulation with minimal antalgic gait to allow walking community distances-GOAL MET  4. Able to sleep at least 6-7 hours without wakened by back pain -GOAL MET  5.  able to sit/drive for more than 1-2 hours -ONGOING  6.  Outcome measure score is improved from 25/50 to 15/90-SAXANWE-CDUENV SCORE IS 18/50    Patient has been seen for 9 visits of low back rehab from 3-26-18 to 5-7-18 with fair progress up until this weekend and feels he may have over done things and his pain is as high as 8/10  prior to therapy on 5-7-18-pain as low as 3/10 with decreased lumbar spasm -LE strength is 3+ to 4-/5 in quads -independent ambulation with minimal antalgic gait -more erect posture with cues-flattened lumbar curve from numerous lumbar surgeries-independent with increased low back and LE exercises -motivated patient -feel he may benefit from a TENS unit for chronic low back pain as patient states he needs to be active caring for his wife who has Parkinsons and maybe early dementia per patient-recertify patient 1 x week x 6 weeks for low back pain relief and LE strengthening with more erect posture from 5-14-18 to 6-28-18-pleasant  gentleman to work with      Rehabilitation Potential For Stated Goals: Good  Regarding Marg[facility] [address] 65[address] therapy, I certify that the treatment plan above will be carried out by a therapist or under their direction. Thank you for this referral,  Hermelinda Ramsey PTA     Referring Physician Signature: Katherin Elizabeth,*              Date                    The information in this section was collected on 3-26-18 (except where otherwise noted). HISTORY:   History of Present Injury/Illness (Reason for Referral):  3rd lumbar surgery in July/2017-low back pain with LE weakness and radicular symptoms   Past Medical History/Comorbidities:   Mr. 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 He also has no past medical history of Adverse effect of anesthesia; Difficult intubation; Malignant hyperthermia due to anesthesia; Nausea & vomiting; or Pseudocholinesterase deficiency.   Mr. Lorrie Gonzalez  has a past surgical history that includes hx lumbar fusion ([id number]); hx colonoscopy (Dec 2003); hx heart catheterization (2001 and 2003); pr left heart cath,percutaneous (2014); hx cataract removal; hx tonsillectomy; pr sinus surgery proc unlisted; hx heent; hx orthopaedic; hx hernia repair; and hx other surgical (08/2017). Social History/Living Environment:      Prior Level of Function/Work/Activity:  Independent with home ADLs but with increased low back pain and LE weakness  Current Medications:       Current Outpatient Prescriptions:     atorvastatin (LIPITOR) 40 mg tablet, TAKE 1 TABLET EVERY EVENING, Disp: 90 Tab, Rfl: 1    leflunomide (ARAVA) 20 mg tablet, TAKE 1 TABLET EVERY DAY, Disp: 90 Tab, Rfl: 1    metoprolol tartrate (LOPRESSOR) 25 mg tablet, Take 1 Tab by mouth two (2) times a day., Disp: 180 Tab, Rfl: 1    levothyroxine (SYNTHROID) 88 mcg tablet, TAKE 1 TAB BY MOUTH DAILY (BEFORE BREAKFAST). , Disp: 90 Tab, Rfl: 1    losartan (COZAAR) 50 mg tablet, Take 1 Tab by mouth daily (after dinner). , Disp: 90 Tab, Rfl: 1    HYDROcodone-acetaminophen (NORCO) 5-325 mg per tablet, Take 1-2 Tabs by mouth every six (6) hours as needed for Pain. Max Daily Amount: 8 Tabs., Disp: 20 Tab, Rfl: 0    naproxen sodium (ANAPROX DS) 550 mg tablet, Take 1 Tab by mouth two (2) times daily (with meals). , Disp: 20 Tab, Rfl: 0    fluticasone (FLONASE) 50 mcg/actuation nasal spray, 2 Sprays by Both Nostrils route as needed for Rhinitis., Disp: , Rfl:     clopidogrel (PLAVIX) 75 mg tab, TAKE 1 TABLET EVERY DAY, Disp: 90 Tab, Rfl: 3    nitroglycerin (NITROSTAT) 0.4 mg SL tablet, 1 Tab by SubLINGual route every five (5) minutes as needed for Chest Pain., Disp: 1 Bottle, Rfl: 3    INFLIXIMAB (REMICADE IV), by IntraVENous route. Every 8 weeks, Disp: , Rfl:     aspirin delayed-release 81 mg tablet, Take 81 mg by mouth nightly., Disp: , Rfl:     ranitidine (ZANTAC) 150 mg tablet, Take 150 mg by mouth as needed. , Disp: , Rfl:    Date Last Reviewed:  5/18/2018   Number of Personal Factors/Comorbidities that affect the Plan of Care:  (patient has a history of HTN, chronic pain ,3 lumbar surgeries, CAD, HTN, and GERD) 1-2: MODERATE COMPLEXITY   EXAMINATION:   Observation/Orthostatic Postural Assessment:          Ambulates with less forward head on neck posture with rounded shoulders and flattened lumbar curve   Palpation:         Very tight B lumbar paraspinals and tender B gluteus medius are decreased   ROM:         LE range of motion is wfl     Trunk range of motion -    Flexion-80% with tight low back and hamstrings   Extension-40% with right side pain   B sidebending- 66% with increased R  low back pain   B rotation I-66% with guarding to the R    Strength: Ankles are ankles are 4-/5 except 3+/5 in L foot inversion     R quads and hamstrings are 3+ to 4-/5     L quads and hamstrings are 3+ to 4-/5     R hip flexor is 4-/5     L hip flexor is 3+ to 4-/5     Special Tests:          Negative spring/compression/valsalva/ negative straight leg raise test with tight hamstrings     Neurological Screen:    Radiating pain and tingling from L knee to L foot   Mental Status:        Alert and oriented      Body Structures Involved:  1. Bones  2. Joints  3. Muscles Body Functions Affected:  1. Sensory/Pain  2. Neuromusculoskeletal  3. Movement Related Activities and Participation Affected:  1. Learning and Applying Knowledge  2. General Tasks and Demands  3. Mobility  4. Self Care  5. Domestic Life   Number of elements (examined above) that affect the Plan of Care: 3: MODERATE COMPLEXITY   CLINICAL PRESENTATION:   Presentation: Evolving clinical presentation with changing clinical characteristics: MODERATE COMPLEXITY   CLINICAL DECISION MAKING:   Outcome Measure: Tool Used: Modified Oswestry Low Back Pain Questionnaire  Score:  Initial: 25/50  Most Recent: 1850 (Date: 5-7-18-- )   Interpretation of Score: Each section is scored on a 0-5 scale, 5 representing the greatest disability. The scores of each section are added together for a total score of 50.     Score 0 1-10 11-20 21-30 31-40 41-49 50   Modifier CH CI CJ CK CL CM CN Changing and Maintaining Body Position:    [id number] - CURRENT STATUS: CJ - 20%-39% impaired, limited or restricted    - GOAL STATUS: CJ - 20%-39% impaired, limited or restricted  ?  - D/C STATUS:  ---------------To be determined---------------       :  Medical Necessity:   · Patient is expected to demonstrate progress in strength, range of motion, balance and coordination to increase independence with ADLs and improve safety during walking and transfers. · Skilled intervention continues to be required due to B low back pain with LE weakness is affecting function and gait . Reason for Services/Other Comments:  · Patient continues to require modification of therapeutic interventions to increase complexity of exercises. Use of outcome tool(s) and clinical judgement create a POC that gives a:  (outcome measure illustrates up to 59% impairment ) Questionable prediction of patient's progress: MODERATE COMPLEXITY            TREATMENT:   (In addition to Assessment/Re-Assessment sessions the following treatments were rendered)  Pre-treatment Symptoms/Complaints:  Patient reports moderate low back pain today. .      Pain: Initial:   Pain Intensity 1: 3  Pain Location 1: Back  Pain Orientation 1: Lower  Post Session:  0/10      Therapeutic Exercise: (25 Minutes):      Exercises per grid below to improve mobility, strength, balance and coordination. Required minimal verbal cues to promote proper body alignment, promote proper body posture and promote proper body mechanics. Progressed resistance, range, repetitions and complexity of movement as indicated.    Date:  5-18-18 Date:  5-1-18 Date:  5-7-18   Activity/Exercise Parameters Parameters Parameters   Knee to chest 3 x 30 sec 3 x 20 sec X 2 minutes with towel    hamstring Strap 4 x 15 sec Strap 3 x 20 sec X 2 minutes with belt    Piriformis stretch 4 x 20 sec 3 x 20 sec X 2 minutes    Sit to stand   1 x 10  1 x 5    Corner stretch       Backward shoulder shrugs      Abdominal bracing 2 x 10 With strengthening exercises 2 x 10 with 3 second hold    SLR  2 x 10    Double knee to chest X 3 X 2    clamshells  2 x 10    Hip abduction Black 3 x 10 Black band 3 x 10 Black band 2 x 10   Alternate knee lifts      ambulation      NU step  10 minutes level 3    Gluteal sets   2 x 10 with 3 second hold   Hip adduction   2 x 10 with yellow theraball         Manual Therapy (    Soft Tissue Mobilization Duration  Duration: 15 Minutes To lumbosacral region in right side lying emphasizing left       Therapeutic Modalities: for pain and edema                 Lumbo-Sacral Spine Heat  Type: Moist pack  Duration : 10 minutes  Patient Position: Lying right side                             Lumbo-Sacral Spine Electric Stimulation  Type: Interferential  Placement: lumbar region  Duration : 10 minutes  Patient Position: Lying right side                                                  NO CHARGE FOR MUSCLE STIMULATION    HEP: As above; handouts given to patient for all exercises. Treatment/Session Assessment:    · Response to Treatment:  Relief with manual therapy and electrical stimulation -good candidate for Tens unit -instructed patient in possible on line purchase   · Compliance with Program/Exercises: compliant most of the time. · Recommendations/Intent for next treatment session: \"Next visit will focus on advancements to more challenging activities\".   ·   Total Treatment Duration: 50 minutes     PT Patient Time In/Time Out  Time In: 0950  Time Out: [facility]

## 2018-05-24 NOTE — PROGRESS NOTES
I am accessing Mr. Blake Crowe chart as a part of our department's internal chart auditing process. I certify that Mr. Angela Shaikh is, or was, a patient in our department.   Thank you,  Torito Newton, PTA  5/24/2018

## 2018-05-25 ENCOUNTER — HOSPITAL ENCOUNTER (OUTPATIENT)
Dept: PHYSICAL THERAPY | Age: 78
Discharge: HOME | End: 2018-05-25
Payer: MEDICARE

## 2018-05-25 PROCEDURE — 97140 MANUAL THERAPY 1/> REGIONS: CPT

## 2018-05-25 PROCEDURE — 97110 THERAPEUTIC EXERCISES: CPT

## 2018-05-25 NOTE — PROGRESS NOTES
Ronnie Evangelista  : 1940  [place]  Secondary: Eladio James 74 at [address], [facility], [address] [place] [address] Street  [phone]   [phone]       OUTPATIENT PHYSICAL THERAPY:Daily Note 2018    ICD-10: Treatment Diagnosis: M54.5 low back pain and R26.89 other abnormalities of gait and mobility   Precautions: bending and lifting at the waist   Allergies: Citric acid and Lisinopril   Fall Risk Score: 2 (? 5 = High Risk)  MD Orders: evaluate and treat  MEDICAL/REFERRING DIAGNOSIS:  Low back pain [M54.5]   DATE OF ONSET:2017-after his 3rd lumbar surgery  REFERRING PHYSICIAN: Ranjana GIRARD,*  RETURN PHYSICIAN APPOINTMENT:2018     INITIAL ASSESSMENT:  Mr. Koko Crooks is a 65 y.o. male presenting to physical therapy with complaints of B low back pain and tightness with radicular symptoms into L lower leg to L foot since he had his 3rd lumbar surgery in 2017-pain level is 3-4/10 with moderate lumbar paraspinal spasm with trigger points in both gluteus medius -flattened lumbar curve -level pelvis and level leg length -ambulates independently with minimal + antalgic gait -trunk range of motion is limited in B sidebending due to increased pain -weakness in B quads and hamstrings makes for falls precautions LE range of motion is wfl -very good candidate for skilled physical therapy to include manual therapy, pain modalities as needed and therapeutic exercises to include  lumbar exercises with LE strengthening  -motivated patient     PROBLEM LIST (Impacting functional limitations):  1. Decreased Strength  2. Decreased ADL/Functional Activities  3. Decreased Transfer Abilities  4. Decreased Ambulation Ability/Technique  5. Decreased Balance  6. Increased Pain  7. Decreased Activity Tolerance  8. Decreased Pacing Skills  9. Decreased Flexibility/Joint Mobility INTERVENTIONS PLANNED:  1. Cold  2.  Electrical Stimulation  3. Gait Training  4. Heat  5. Home Exercise Program (HEP)  6. Manual Therapy  7. Range of Motion (ROM)  8. Therapeutic Exercise/Strengthening   TREATMENT PLAN:  Effective Dates: 5-14-18 to 6-28-18  Frequency/Duration: 1 times a week for 6 weeks  GOALS: (Goals have been discussed and agreed upon with patient.)  Short-Term Functional Goals: Time Frame: 4-7-18  1. Low back pain is less than 3-4/10 to progress to DC goal-ONGOING  2. Decreased lumbar spasm -GOAL MET  3. More extended posture-ONGOING  Instruction in exercise program to allow increased ADLs. -GOAL MET  Discharge Goals: Time Frame:6-28-18  1. Low back pain is 2/10 to allow most home ADLs including light lifting-ONGOING  2. Trunk range of motion is 75% in all ranges to allow increased personal care ability including dressing and washing and in and out of car and driving -GOAL MET  3. Independent ambulation with minimal antalgic gait to allow walking community distances-GOAL MET  4. Able to sleep at least 6-7 hours without wakened by back pain -GOAL MET  5.  able to sit/drive for more than 1-2 hours -ONGOING  6.  Outcome measure score is improved from 25/50 to 15/62-[id number] SCORE IS 18/50    Patient has been seen for 9 visits of low back rehab from 3-26-18 to 5-7-18 with fair progress up until this weekend and feels he may have over done things and his pain is as high as 8/10  prior to therapy on 5-7-18-pain as low as 3/10 with decreased lumbar spasm -LE strength is 3+ to 4-/5 in quads -independent ambulation with minimal antalgic gait -more erect posture with cues-flattened lumbar curve from numerous lumbar surgeries-independent with increased low back and LE exercises -motivated patient -feel he may benefit from a TENS unit for chronic low back pain as patient states he needs to be active caring for his wife who has Parkinsons and maybe early dementia per patient-recertify patient 1 x week x 6 weeks for low back pain relief and LE strengthening with more erect posture from 5-14-18 to 6-28-18-pleasant  gentleman to work with      Rehabilitation Potential For Stated Goals: Good  Regarding Margarita Kasper [address] 65. therapy, I certify that the treatment plan above will be carried out by a therapist or under their direction. Thank you for this referral,  Thomas Patel PTA     Referring Physician Signature: Mary Ellen Sterling,*              Date                    The information in this section was collected on 3-26-18 (except where otherwise noted). HISTORY:   History of Present Injury/Illness (Reason for Referral):  3rd lumbar surgery in July/2017-low back pain with LE weakness and radicular symptoms   Past Medical History/Comorbidities:   Mr. 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 He also has no past medical history of Adverse effect of anesthesia; Difficult intubation; Malignant hyperthermia due to anesthesia; Nausea & vomiting; or Pseudocholinesterase deficiency.   Mr. Félix Clinton  has a past surgical history that includes hx lumbar fusion ([id number]); hx colonoscopy (Dec 2003); hx heart catheterization (2001 and 2003); pr left heart cath,percutaneous (2014); hx cataract removal; hx tonsillectomy; pr sinus surgery proc unlisted; hx heent; hx orthopaedic; hx hernia repair; and hx other surgical (08/2017). Social History/Living Environment:      Prior Level of Function/Work/Activity:  Independent with home ADLs but with increased low back pain and LE weakness  Current Medications:       Current Outpatient Prescriptions:     atorvastatin (LIPITOR) 40 mg tablet, TAKE 1 TABLET EVERY EVENING, Disp: 90 Tab, Rfl: 1    leflunomide (ARAVA) 20 mg tablet, TAKE 1 TABLET EVERY DAY, Disp: 90 Tab, Rfl: 1    metoprolol tartrate (LOPRESSOR) 25 mg tablet, Take 1 Tab by mouth two (2) times a day., Disp: 180 Tab, Rfl: 1    levothyroxine (SYNTHROID) 88 mcg tablet, TAKE 1 TAB BY MOUTH DAILY (BEFORE BREAKFAST). , Disp: 90 Tab, Rfl: 1    losartan (COZAAR) 50 mg tablet, Take 1 Tab by mouth daily (after dinner). , Disp: 90 Tab, Rfl: 1    HYDROcodone-acetaminophen (NORCO) 5-325 mg per tablet, Take 1-2 Tabs by mouth every six (6) hours as needed for Pain. Max Daily Amount: 8 Tabs., Disp: 20 Tab, Rfl: 0    naproxen sodium (ANAPROX DS) 550 mg tablet, Take 1 Tab by mouth two (2) times daily (with meals). , Disp: 20 Tab, Rfl: 0    fluticasone (FLONASE) 50 mcg/actuation nasal spray, 2 Sprays by Both Nostrils route as needed for Rhinitis., Disp: , Rfl:     clopidogrel (PLAVIX) 75 mg tab, TAKE 1 TABLET EVERY DAY, Disp: 90 Tab, Rfl: 3    nitroglycerin (NITROSTAT) 0.4 mg SL tablet, 1 Tab by SubLINGual route every five (5) minutes as needed for Chest Pain., Disp: 1 Bottle, Rfl: 3    INFLIXIMAB (REMICADE IV), by IntraVENous route. Every 8 weeks, Disp: , Rfl:     aspirin delayed-release 81 mg tablet, Take 81 mg by mouth nightly., Disp: , Rfl:     ranitidine (ZANTAC) 150 mg tablet, Take 150 mg by mouth as needed. , Disp: , Rfl:    Date Last Reviewed:  5/25/2018   Number of Personal Factors/Comorbidities that affect the Plan of Care:  (patient has a history of HTN, chronic pain ,3 lumbar surgeries, CAD, HTN, and GERD) 1-2: MODERATE COMPLEXITY   EXAMINATION:   Observation/Orthostatic Postural Assessment:          Ambulates with less forward head on neck posture with rounded shoulders and flattened lumbar curve   Palpation:         Very tight B lumbar paraspinals and tender B gluteus medius are decreased   ROM:         LE range of motion is wfl     Trunk range of motion -    Flexion-80% with tight low back and hamstrings   Extension-40% with right side pain   B sidebending- 66% with increased R  low back pain   B rotation I-66% with guarding to the R    Strength: Ankles are ankles are 4-/5 except 3+/5 in L foot inversion     R quads and hamstrings are 3+ to 4-/5     L quads and hamstrings are 3+ to 4-/5     R hip flexor is 4-/5     L hip flexor is 3+ to 4-/5     Special Tests:          Negative spring/compression/valsalva/ negative straight leg raise test with tight hamstrings     Neurological Screen:    Radiating pain and tingling from L knee to L foot   Mental Status:        Alert and oriented      Body Structures Involved:  1. Bones  2. Joints  3. Muscles Body Functions Affected:  1. Sensory/Pain  2. Neuromusculoskeletal  3. Movement Related Activities and Participation Affected:  1. Learning and Applying Knowledge  2. General Tasks and Demands  3. Mobility  4. Self Care  5. Domestic Life   Number of elements (examined above) that affect the Plan of Care: 3: MODERATE COMPLEXITY   CLINICAL PRESENTATION:   Presentation: Evolving clinical presentation with changing clinical characteristics: MODERATE COMPLEXITY   CLINICAL DECISION MAKING:   Outcome Measure: Tool Used: Modified Oswestry Low Back Pain Questionnaire  Score:  Initial: 25/50  Most Recent: 1850 (Date: 5-7-18-- )   Interpretation of Score: Each section is scored on a 0-5 scale, 5 representing the greatest disability. The scores of each section are added together for a total score of 50.     Score 0 1-10 11-20 21-30 31-40 41-49 50   Modifier CH CI CJ CK CL CM CN Changing and Maintaining Body Position:    [id number] - CURRENT STATUS: CJ - 20%-39% impaired, limited or restricted    - GOAL STATUS: CJ - 20%-39% impaired, limited or restricted  ?  - D/C STATUS:  ---------------To be determined---------------       :  Medical Necessity:   · Patient is expected to demonstrate progress in strength, range of motion, balance and coordination to increase independence with ADLs and improve safety during walking and transfers. · Skilled intervention continues to be required due to B low back pain with LE weakness is affecting function and gait . Reason for Services/Other Comments:  · Patient continues to require modification of therapeutic interventions to increase complexity of exercises. Use of outcome tool(s) and clinical judgement create a POC that gives a:  (outcome measure illustrates up to 59% impairment ) Questionable prediction of patient's progress: MODERATE COMPLEXITY            TREATMENT:   (In addition to Assessment/Re-Assessment sessions the following treatments were rendered)  Pre-treatment Symptoms/Complaints:  Patient reports pain is about the same. Continues to report increased pain after prolonged standing and with activities. .      Pain: Initial:   Pain Intensity 1: 3  Pain Location 1: Back  Pain Orientation 1: Lower  Post Session:  3/10      Therapeutic Exercise: (40 Minutes):      Exercises per grid below to improve mobility, strength, balance and coordination. Required minimal verbal cues to promote proper body alignment, promote proper body posture and promote proper body mechanics. Progressed resistance, range, repetitions and complexity of movement as indicated.    Date:  5-18-18 Date:  5-25-18 Date:  5-7-18   Activity/Exercise Parameters Parameters Parameters   Knee to chest 3 x 30 sec 3 x 20 sec X 2 minutes with towel    hamstring Strap 4 x 15 sec Strap 3 x 20 sec X 2 minutes with belt    Piriformis stretch 4 x 20 sec 3 x 20 sec X 2 minutes Sit to stand   1 x 10  1 x 5    Corner stretch       Backward shoulder shrugs      Abdominal bracing 2 x 10 With strengthening exercises 2 x 10 with 3 second hold    SLR  4 x 5    Double knee to chest X 3 X 3    clamshells  2 x 10    Hip abduction Black 3 x 10 Black band 3 x 10 Black band 2 x 10   Alternate knee lifts      ambulation      NU step  12 minutes level 3    Gluteal sets   2 x 10 with 3 second hold   Hip adduction   2 x 10 with yellow theraball         Manual Therapy (    Soft Tissue Mobilization Duration  Duration: 15 Minutes To lumbosacral region in right side lying emphasizing left       Therapeutic Modalities: for pain and edema                 Lumbo-Sacral Spine Heat  Type: Moist pack  Duration : 10 minutes  Patient Position: Lying right side                                                                                    HEP: As above; handouts given to patient for all exercises. Treatment/Session Assessment:    · Response to Treatment:  Decreased tightness and tenderness after session   · Compliance with Program/Exercises: compliant most of the time. · Recommendations/Intent for next treatment session: \"Next visit will focus on advancements to more challenging activities\".   ·   Total Treatment Duration: 55 minutes     PT Patient Time In/Time Out  Time In: 0850  Time Out: Fernando Orozco 14, PTA

## 2018-05-31 ENCOUNTER — HOSPITAL ENCOUNTER (OUTPATIENT)
Dept: PHYSICAL THERAPY | Age: 78
Discharge: HOME | End: 2018-05-31
Payer: MEDICARE

## 2018-05-31 PROCEDURE — 97140 MANUAL THERAPY 1/> REGIONS: CPT

## 2018-05-31 PROCEDURE — 97110 THERAPEUTIC EXERCISES: CPT

## 2018-05-31 NOTE — PROGRESS NOTES
Rtuhann Guy  : 1940  [place]  Secondary: Eladio James [address] at [address], [address]  [phone]   [phone]       OUTPATIENT PHYSICAL THERAPY:Daily Note 2018    ICD-10: Treatment Diagnosis: M54.5 low back pain and R26.89 other abnormalities of gait and mobility   Precautions: bending and lifting at the waist   Allergies: Citric acid and Lisinopril   Fall Risk Score: 2 (? 5 = High Risk)  MD Orders: evaluate and treat  MEDICAL/REFERRING DIAGNOSIS:  Low back pain [M54.5]   DATE OF ONSET:2017-after his 3rd lumbar surgery  REFERRING PHYSICIAN: William GIRARD,*  RETURN PHYSICIAN APPOINTMENT:2018     INITIAL ASSESSMENT:  Mr. Danisha Blum is a 65 y.o. male presenting to physical therapy with complaints of B low back pain and tightness with radicular symptoms into L lower leg to L foot since he had his 3rd lumbar surgery in 2017-pain level is 3-4/10 with moderate lumbar paraspinal spasm with trigger points in both gluteus medius -flattened lumbar curve -level pelvis and level leg length -ambulates independently with minimal + antalgic gait -trunk range of motion is limited in B sidebending due to increased pain -weakness in B quads and hamstrings makes for falls precautions LE range of motion is wfl -very good candidate for skilled physical therapy to include manual therapy, pain modalities as needed and therapeutic exercises to include  lumbar exercises with LE strengthening  -motivated patient     PROBLEM LIST (Impacting functional limitations):  1. Decreased Strength  2. Decreased ADL/Functional Activities  3. Decreased Transfer Abilities  4. Decreased Ambulation Ability/Technique  5. Decreased Balance  6. Increased Pain  7. Decreased Activity Tolerance  8. Decreased Pacing Skills  9. Decreased Flexibility/Joint Mobility INTERVENTIONS PLANNED:  1. Cold  2.  Electrical Stimulation  3. Gait Training  4. Heat  5. Home Exercise Program (HEP)  6. Manual Therapy  7. Range of Motion (ROM)  8. Therapeutic Exercise/Strengthening   TREATMENT PLAN:  Effective Dates: 5-14-18 to 6-28-18  Frequency/Duration: 1 times a week for 6 weeks  GOALS: (Goals have been discussed and agreed upon with patient.)  Short-Term Functional Goals: Time Frame: 4-7-18  1. Low back pain is less than 3-4/10 to progress to DC goal-ONGOING  2. Decreased lumbar spasm -GOAL MET  3. More extended posture-ONGOING  Instruction in exercise program to allow increased ADLs. -GOAL MET  Discharge Goals: Time Frame:6-28-18  1. Low back pain is 2/10 to allow most home ADLs including light lifting-ONGOING  2. Trunk range of motion is 75% in all ranges to allow increased personal care ability including dressing and washing and in and out of car and driving -GOAL MET  3. Independent ambulation with minimal antalgic gait to allow walking community distances-GOAL MET  4. Able to sleep at least 6-7 hours without wakened by back pain -GOAL MET  5.  able to sit/drive for more than 1-2 hours -ONGOING  6.  Outcome measure score is improved from 25/50 to 15/67-[id number] SCORE IS 18/50    Patient has been seen for 9 visits of low back rehab from 3-26-18 to 5-7-18 with fair progress up until this weekend and feels he may have over done things and his pain is as high as 8/10  prior to therapy on 5-7-18-pain as low as 3/10 with decreased lumbar spasm -LE strength is 3+ to 4-/5 in quads -independent ambulation with minimal antalgic gait -more erect posture with cues-flattened lumbar curve from numerous lumbar surgeries-independent with increased low back and LE exercises -motivated patient -feel he may benefit from a TENS unit for chronic low back pain as patient states he needs to be active caring for his wife who has Parkinsons and maybe early dementia per patient-recertify patient 1 x week x 6 weeks for low back pain relief and LE strengthening with more erect posture from 5-14-18 to 6-28-18-pleasant  gentleman to work with      Rehabilitation Potential For Stated Goals: Good  Regarding Margarita K[address] 65[address] therapy, I certify that the treatment plan above will be carried out by a therapist or under their direction. Thank you for this referral,  Latoya Jack PTA     Referring Physician Signature: Leona Norris,*              Date                    The information in this section was collected on 3-26-18 (except where otherwise noted). HISTORY:   History of Present Injury/Illness (Reason for Referral):  3rd lumbar surgery in July/2017-low back pain with LE weakness and radicular symptoms   Past Medical History/Comorbidities:   Mr. 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 He also has no past medical history of Adverse effect of anesthesia; Difficult intubation; Malignant hyperthermia due to anesthesia; Nausea & vomiting; or Pseudocholinesterase deficiency.   Mr. Cuong Sotomayor  has a past surgical history that includes hx lumbar fusion ([id number]); hx colonoscopy (Dec 2003); hx heart catheterization (2001 and 2003); pr left heart cath,percutaneous (2014); hx cataract removal; hx tonsillectomy; pr sinus surgery proc unlisted; hx heent; hx orthopaedic; hx hernia repair; and hx other surgical (08/2017). Social History/Living Environment:      Prior Level of Function/Work/Activity:  Independent with home ADLs but with increased low back pain and LE weakness  Current Medications:       Current Outpatient Prescriptions:     atorvastatin (LIPITOR) 40 mg tablet, TAKE 1 TABLET EVERY EVENING, Disp: 90 Tab, Rfl: 1    leflunomide (ARAVA) 20 mg tablet, TAKE 1 TABLET EVERY DAY, Disp: 90 Tab, Rfl: 1    metoprolol tartrate (LOPRESSOR) 25 mg tablet, Take 1 Tab by mouth two (2) times a day., Disp: 180 Tab, Rfl: 1    levothyroxine (SYNTHROID) 88 mcg tablet, TAKE 1 TAB BY MOUTH DAILY (BEFORE BREAKFAST). , Disp: 90 Tab, Rfl: 1    losartan (COZAAR) 50 mg tablet, Take 1 Tab by mouth daily (after dinner). , Disp: 90 Tab, Rfl: 1    HYDROcodone-acetaminophen (NORCO) 5-325 mg per tablet, Take 1-2 Tabs by mouth every six (6) hours as needed for Pain. Max Daily Amount: 8 Tabs., Disp: 20 Tab, Rfl: 0    naproxen sodium (ANAPROX DS) 550 mg tablet, Take 1 Tab by mouth two (2) times daily (with meals). , Disp: 20 Tab, Rfl: 0    fluticasone (FLONASE) 50 mcg/actuation nasal spray, 2 Sprays by Both Nostrils route as needed for Rhinitis., Disp: , Rfl:     clopidogrel (PLAVIX) 75 mg tab, TAKE 1 TABLET EVERY DAY, Disp: 90 Tab, Rfl: 3    nitroglycerin (NITROSTAT) 0.4 mg SL tablet, 1 Tab by SubLINGual route every five (5) minutes as needed for Chest Pain., Disp: 1 Bottle, Rfl: 3    INFLIXIMAB (REMICADE IV), by IntraVENous route. Every 8 weeks, Disp: , Rfl:     aspirin delayed-release 81 mg tablet, Take 81 mg by mouth nightly., Disp: , Rfl:     ranitidine (ZANTAC) 150 mg tablet, Take 150 mg by mouth as needed. , Disp: , Rfl:    Date Last Reviewed:  5/31/2018   Number of Personal Factors/Comorbidities that affect the Plan of Care:  (patient has a history of HTN, chronic pain ,3 lumbar surgeries, CAD, HTN, and GERD) 1-2: MODERATE COMPLEXITY   EXAMINATION:   Observation/Orthostatic Postural Assessment:          Ambulates with less forward head on neck posture with rounded shoulders and flattened lumbar curve   Palpation:         Very tight B lumbar paraspinals and tender B gluteus medius are decreased   ROM:         LE range of motion is wfl     Trunk range of motion -    Flexion-80% with tight low back and hamstrings   Extension-40% with right side pain   B sidebending- 66% with increased R  low back pain   B rotation I-66% with guarding to the R    Strength: Ankles are ankles are 4-/5 except 3+/5 in L foot inversion     R quads and hamstrings are 3+ to 4-/5     L quads and hamstrings are 3+ to 4-/5     R hip flexor is 4-/5     L hip flexor is 3+ to 4-/5     Special Tests:          Negative spring/compression/valsalva/ negative straight leg raise test with tight hamstrings     Neurological Screen:    Radiating pain and tingling from L knee to L foot   Mental Status:        Alert and oriented      Body Structures Involved:  1. Bones  2. Joints  3. Muscles Body Functions Affected:  1. Sensory/Pain  2. Neuromusculoskeletal  3. Movement Related Activities and Participation Affected:  1. Learning and Applying Knowledge  2. General Tasks and Demands  3. Mobility  4. Self Care  5. Domestic Life   Number of elements (examined above) that affect the Plan of Care: 3: MODERATE COMPLEXITY   CLINICAL PRESENTATION:   Presentation: Evolving clinical presentation with changing clinical characteristics: MODERATE COMPLEXITY   CLINICAL DECISION MAKING:   Outcome Measure: Tool Used: Modified Oswestry Low Back Pain Questionnaire  Score:  Initial: 25/50  Most Recent: 1850 (Date: 5-7-18-- )   Interpretation of Score: Each section is scored on a 0-5 scale, 5 representing the greatest disability. The scores of each section are added together for a total score of 50.     Score 0 1-10 11-20 21-30 31-40 41-49 50   Modifier CH CI CJ CK CL CM CN Changing and Maintaining Body Position:    [id number] - CURRENT STATUS: CJ - 20%-39% impaired, limited or restricted    - GOAL STATUS: CJ - 20%-39% impaired, limited or restricted  ?  - D/C STATUS:  ---------------To be determined---------------       :  Medical Necessity:   · Patient is expected to demonstrate progress in strength, range of motion, balance and coordination to increase independence with ADLs and improve safety during walking and transfers. · Skilled intervention continues to be required due to B low back pain with LE weakness is affecting function and gait . Reason for Services/Other Comments:  · Patient continues to require modification of therapeutic interventions to increase complexity of exercises. Use of outcome tool(s) and clinical judgement create a POC that gives a:  (outcome measure illustrates up to 59% impairment ) Questionable prediction of patient's progress: MODERATE COMPLEXITY            TREATMENT:   (In addition to Assessment/Re-Assessment sessions the following treatments were rendered)  Pre-treatment Symptoms/Complaints:  Patient reports pain is about the same. .      Pain: Initial:   Pain Intensity 1: 6  Pain Location 1: Back  Pain Orientation 1: Lower  Post Session:  4/10      Therapeutic Exercise: (30 Minutes):      Exercises per grid below to improve mobility, strength, balance and coordination. Required minimal verbal cues to promote proper body alignment, promote proper body posture and promote proper body mechanics. Progressed resistance, range, repetitions and complexity of movement as indicated.    Date:  5-18-18 Date:  5-25-18 Date:  5-31-18   Activity/Exercise Parameters Parameters Parameters   Knee to chest 3 x 30 sec 3 x 20 sec 3 x 20 sec   hamstring Strap 4 x 15 sec Strap 3 x 20 sec Strap 3 x 20 sec   Piriformis stretch 4 x 20 sec 3 x 20 sec 3 x 20 sec   Sit to stand   1 x 10  1 x 5 2 x 10   Corner stretch       Backward shoulder shrugs      Abdominal bracing 2 x 10 With strengthening exercises 2 x 10   SLR  4 x 5 2 x 10   Double knee to chest X 3 X 3    clamshells  2 x 10 2 x 10   Hip abduction Black 3 x 10 Black band 3 x 10 Black band 3 x 10   Alternate knee lifts      ambulation      NU step  12 minutes level 3 10 minutes level 5   Gluteal sets      Hip adduction            Manual Therapy (    Soft Tissue Mobilization Duration  Duration: 15 Minutes To lumbosacral region in left side lying including effleurage,petrissage and acupressure      Therapeutic Modalities: for pain and edema                                                                                                      HEP: As above; handouts given to patient for all exercises. Treatment/Session Assessment:    · Response to Treatment:  Decreased tightness and tenderness after session   · Compliance with Program/Exercises: compliant most of the time. · Recommendations/Intent for next treatment session: \"Next visit will focus on advancements to more challenging activities\".   ·   Total Treatment Duration: 45 minutes     PT Patient Time In/Time Out  Time In: 0915  Time Out: Fernando Orozco 14, PTA

## 2018-06-12 ENCOUNTER — HOSPITAL ENCOUNTER (OUTPATIENT)
Dept: PHYSICAL THERAPY | Age: 78
Discharge: HOME | End: 2018-06-12
Payer: MEDICARE

## 2018-06-12 PROCEDURE — 97110 THERAPEUTIC EXERCISES: CPT

## 2018-06-12 NOTE — PROGRESS NOTES
Broadus Ganser  : 1940  [place]  Secondary: Eladio James 74 at [facility]  [address] [place] [address], [place], [address] [place] [address]  [phone]   [phone]       OUTPATIENT PHYSICAL THERAPY:Daily Note 2018    ICD-10: Treatment Diagnosis: M54.5 low back pain and R26.89 other abnormalities of gait and mobility   Precautions: bending and lifting at the waist   Allergies: Citric acid and Lisinopril   Fall Risk Score: 2 (? 5 = High Risk)  MD Orders: evaluate and treat  MEDICAL/REFERRING DIAGNOSIS:  Low back pain [M54.5]   DATE OF ONSET:2017-after his 3rd lumbar surgery  REFERRING PHYSICIAN: Shanti GIRARD,*  RETURN PHYSICIAN APPOINTMENT:2018     INITIAL ASSESSMENT:  Mr. Jaron Howell is a 65 y.o. male presenting to physical therapy with complaints of B low back pain and tightness with radicular symptoms into L lower leg to L foot since he had his 3rd lumbar surgery in 2017-pain level is 3-4/10 with moderate lumbar paraspinal spasm with trigger points in both gluteus medius -flattened lumbar curve -level pelvis and level leg length -ambulates independently with minimal + antalgic gait -trunk range of motion is limited in B sidebending due to increased pain -weakness in B quads and hamstrings makes for falls precautions LE range of motion is wfl -very good candidate for skilled physical therapy to include manual therapy, pain modalities as needed and therapeutic exercises to include  lumbar exercises with LE strengthening  -motivated patient     PROBLEM LIST (Impacting functional limitations):  1. Decreased Strength  2. Decreased ADL/Functional Activities  3. Decreased Transfer Abilities  4. Decreased Ambulation Ability/Technique  5. Decreased Balance  6. Increased Pain  7. Decreased Activity Tolerance  8. Decreased Pacing Skills  9. Decreased Flexibility/Joint Mobility INTERVENTIONS PLANNED:  1. Cold  2.  Electrical Stimulation  3. Gait Training  4. Heat  5. Home Exercise Program (HEP)  6. Manual Therapy  7. Range of Motion (ROM)  8. Therapeutic Exercise/Strengthening   TREATMENT PLAN:  Effective Dates: 5-14-18 to 6-28-18  Frequency/Duration: 1 times a week for 6 weeks  GOALS: (Goals have been discussed and agreed upon with patient.)  Short-Term Functional Goals: Time Frame: 4-7-18  1. Low back pain is less than 3-4/10 to progress to DC goal-ONGOING  2. Decreased lumbar spasm -GOAL MET  3. More extended posture-ONGOING  Instruction in exercise program to allow increased ADLs. -GOAL MET  Discharge Goals: Time Frame:6-28-18  1. Low back pain is 2/10 to allow most home ADLs including light lifting-ONGOING  2. Trunk range of motion is 75% in all ranges to allow increased personal care ability including dressing and washing and in and out of car and driving -GOAL MET  3. Independent ambulation with minimal antalgic gait to allow walking community distances-GOAL MET  4. Able to sleep at least 6-7 hours without wakened by back pain -GOAL MET  5.  able to sit/drive for more than 1-2 hours -ONGOING  6.  Outcome measure score is improved from 25/50 to 15/37-[id number] SCORE IS 18/50    Patient has been seen for 9 visits of low back rehab from 3-26-18 to 5-7-18 with fair progress up until this weekend and feels he may have over done things and his pain is as high as 8/10  prior to therapy on 5-7-18-pain as low as 3/10 with decreased lumbar spasm -LE strength is 3+ to 4-/5 in quads -independent ambulation with minimal antalgic gait -more erect posture with cues-flattened lumbar curve from numerous lumbar surgeries-independent with increased low back and LE exercises -motivated patient -feel he may benefit from a TENS unit for chronic low back pain as patient states he needs to be active caring for his wife who has Parkinsons and maybe early dementia per patient-recertify patient 1 x week x 6 weeks for low back pain relief and LE strengthening with more erect posture from 5-14-18 to 6-28-18-pleasant  gentleman to work with      Rehabilitation Potential For Stated Goals: Good  Regarding Margarita Kasper [address] 65. therapy, I certify that the treatment plan above will be carried out by a therapist or under their direction. Thank you for this referral,  Tahmina Davis PTA     Referring Physician Signature: Harini Lopez,*              Date                    The information in this section was collected on 3-26-18 (except where otherwise noted). HISTORY:   History of Present Injury/Illness (Reason for Referral):  3rd lumbar surgery in July/2017-low back pain with LE weakness and radicular symptoms   Past Medical History/Comorbidities:   Mr. 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 He also has no past medical history of Adverse effect of anesthesia; Difficult intubation; Malignant hyperthermia due to anesthesia; Nausea & vomiting; or Pseudocholinesterase deficiency.   Mr. Yohana Green  has a past surgical history that includes hx lumbar fusion ([id number]); hx colonoscopy (Dec 2003); hx heart catheterization (2001 and 2003); pr left heart cath,percutaneous (2014); hx cataract removal; hx tonsillectomy; pr sinus surgery proc unlisted; hx heent; hx orthopaedic; hx hernia repair; and hx other surgical (08/2017). Social History/Living Environment:      Prior Level of Function/Work/Activity:  Independent with home ADLs but with increased low back pain and LE weakness  Current Medications:       Current Outpatient Prescriptions:     atorvastatin (LIPITOR) 40 mg tablet, TAKE 1 TABLET EVERY EVENING, Disp: 90 Tab, Rfl: 1    leflunomide (ARAVA) 20 mg tablet, TAKE 1 TABLET EVERY DAY, Disp: 90 Tab, Rfl: 1    metoprolol tartrate (LOPRESSOR) 25 mg tablet, Take 1 Tab by mouth two (2) times a day., Disp: 180 Tab, Rfl: 1    levothyroxine (SYNTHROID) 88 mcg tablet, TAKE 1 TAB BY MOUTH DAILY (BEFORE BREAKFAST). , Disp: 90 Tab, Rfl: 1    losartan (COZAAR) 50 mg tablet, Take 1 Tab by mouth daily (after dinner). , Disp: 90 Tab, Rfl: 1    HYDROcodone-acetaminophen (NORCO) 5-325 mg per tablet, Take 1-2 Tabs by mouth every six (6) hours as needed for Pain. Max Daily Amount: 8 Tabs., Disp: 20 Tab, Rfl: 0    naproxen sodium (ANAPROX DS) 550 mg tablet, Take 1 Tab by mouth two (2) times daily (with meals). , Disp: 20 Tab, Rfl: 0    fluticasone (FLONASE) 50 mcg/actuation nasal spray, 2 Sprays by Both Nostrils route as needed for Rhinitis., Disp: , Rfl:     clopidogrel (PLAVIX) 75 mg tab, TAKE 1 TABLET EVERY DAY, Disp: 90 Tab, Rfl: 3    nitroglycerin (NITROSTAT) 0.4 mg SL tablet, 1 Tab by SubLINGual route every five (5) minutes as needed for Chest Pain., Disp: 1 Bottle, Rfl: 3    INFLIXIMAB (REMICADE IV), by IntraVENous route. Every 8 weeks, Disp: , Rfl:     aspirin delayed-release 81 mg tablet, Take 81 mg by mouth nightly., Disp: , Rfl:     ranitidine (ZANTAC) 150 mg tablet, Take 150 mg by mouth as needed. , Disp: , Rfl:    Date Last Reviewed:  6/12/2018   Number of Personal Factors/Comorbidities that affect the Plan of Care:  (patient has a history of HTN, chronic pain ,3 lumbar surgeries, CAD, HTN, and GERD) 1-2: MODERATE COMPLEXITY   EXAMINATION:   Observation/Orthostatic Postural Assessment:          Ambulates with less forward head on neck posture with rounded shoulders and flattened lumbar curve   Palpation:         Very tight B lumbar paraspinals and tender B gluteus medius are decreased   ROM:         LE range of motion is wfl     Trunk range of motion -    Flexion-80% with tight low back and hamstrings   Extension-40% with right side pain   B sidebending- 66% with increased R  low back pain   B rotation I-66% with guarding to the R    Strength: Ankles are ankles are 4-/5 except 3+/5 in L foot inversion     R quads and hamstrings are 3+ to 4-/5     L quads and hamstrings are 3+ to 4-/5     R hip flexor is 4-/5     L hip flexor is 3+ to 4-/5     Special Tests:          Negative spring/compression/valsalva/ negative straight leg raise test with tight hamstrings     Neurological Screen:    Radiating pain and tingling from L knee to L foot   Mental Status:        Alert and oriented      Body Structures Involved:  1. Bones  2. Joints  3. Muscles Body Functions Affected:  1. Sensory/Pain  2. Neuromusculoskeletal  3. Movement Related Activities and Participation Affected:  1. Learning and Applying Knowledge  2. General Tasks and Demands  3. Mobility  4. Self Care  5. Domestic Life   Number of elements (examined above) that affect the Plan of Care: 3: MODERATE COMPLEXITY   CLINICAL PRESENTATION:   Presentation: Evolving clinical presentation with changing clinical characteristics: MODERATE COMPLEXITY   CLINICAL DECISION MAKING:   Outcome Measure: Tool Used: Modified Oswestry Low Back Pain Questionnaire  Score:  Initial: 25/50  Most Recent: 1850 (Date: 5-7-18-- )   Interpretation of Score: Each section is scored on a 0-5 scale, 5 representing the greatest disability. The scores of each section are added together for a total score of 50.     Score 0 1-10 11-20 21-30 31-40 41-49 50   Modifier CH CI CJ CK CL CM CN Changing and Maintaining Body Position:    [id number] - CURRENT STATUS: CJ - 20%-39% impaired, limited or restricted    - GOAL STATUS: CJ - 20%-39% impaired, limited or restricted  ?  - D/C STATUS:  ---------------To be determined---------------       :  Medical Necessity:   · Patient is expected to demonstrate progress in strength, range of motion, balance and coordination to increase independence with ADLs and improve safety during walking and transfers. · Skilled intervention continues to be required due to B low back pain with LE weakness is affecting function and gait . Reason for Services/Other Comments:  · Patient continues to require modification of therapeutic interventions to increase complexity of exercises. Use of outcome tool(s) and clinical judgement create a POC that gives a:  (outcome measure illustrates up to 59% impairment ) Questionable prediction of patient's progress: MODERATE COMPLEXITY            TREATMENT:   (In addition to Assessment/Re-Assessment sessions the following treatments were rendered)  Pre-treatment Symptoms/Complaints:  Patient reports pain is the same. . States he is fine sitting however can only walk about 100 feet before pain increases significantly and he has to sit down . Pain: Initial:   Pain Intensity 1: 6  Pain Location 1: Back  Pain Orientation 1: Lower  Post Session:  4/10      Therapeutic Exercise: (55 Minutes):      Exercises per grid below to improve mobility, strength, balance and coordination. Required minimal verbal cues to promote proper body alignment, promote proper body posture and promote proper body mechanics. Progressed resistance, range, repetitions and complexity of movement as indicated.    Date:  6-12-18 Date:  5-25-18 Date:  5-31-18   Activity/Exercise Parameters Parameters Parameters   Knee to chest 3 x 30 sec 3 x 20 sec 3 x 20 sec   hamstring Strap 4 x 15 sec Strap 3 x 20 sec Strap 3 x 20 sec   Piriformis stretch 4 x 20 sec 3 x 20 sec 3 x 20 sec   Sit to stand  2 x 10  1 x 5 1 x 10  1 x 5 2 x 10   Corner stretch  5 x 15 sec     Backward shoulder shrugs      Abdominal bracing 2 x 10 With strengthening exercises 2 x 10   SLR  4 x 5 2 x 10   Double knee to chest X 3 X 3    clamshells  2 x 10 2 x 10   Hip abduction Black 3 x 10 Black band 3 x 10 Black band 3 x 10   Alternate knee lifts      ambulation      NU step 12 minutes level 4 12 minutes level 3 10 minutes level 5   Gluteal sets      Hip adduction      Thoracic extension Sitting with half roll 2 x 10     Scapular retraction Sitting half roll 2 x 10     Low rows Red band 2 x 10     Calf stretch Incline 5 x 20 sec             Educated in and practiced proper posture    Manual Therapy (            Therapeutic Modalities: for pain and edema                                                                                                      HEP: As above; handouts given to patient for all exercises. Treatment/Session Assessment:    · Response to Treatment:   Improved posture awareness after session  · Compliance with Program/Exercises: compliant most of the time. · Recommendations/Intent for next treatment session: \"Next visit will focus on advancements to more challenging activities\".   ·   Total Treatment Duration: 55 minutes     PT Patient Time In/Time Out  Time In: 1005  Time Out: 305 [facility]

## 2018-06-25 ENCOUNTER — HOSPITAL ENCOUNTER (OUTPATIENT)
Dept: PHYSICAL THERAPY | Age: 78
Discharge: HOME | End: 2018-06-25
Payer: MEDICARE

## 2018-06-25 PROCEDURE — 97110 THERAPEUTIC EXERCISES: CPT

## 2018-06-25 PROCEDURE — 97140 MANUAL THERAPY 1/> REGIONS: CPT

## 2018-06-25 PROCEDURE — G8982 BODY POS GOAL STATUS: HCPCS

## 2018-06-25 PROCEDURE — G8983 BODY POS D/C STATUS: HCPCS

## 2018-06-25 NOTE — THERAPY DISCHARGE
Rebecca Phan  : 1940  [place]  Secondary: Eladio James 74 at [facility]  [address] [place] [address], [place], [address] [place] [address]  [phone]   [phone]       OUTPATIENT PHYSICAL THERAPY:Daily Note and Discharge 2018    ICD-10: Treatment Diagnosis: M54.5 low back pain and R26.89 other abnormalities of gait and mobility   Precautions: bending and lifting at the waist   Allergies: Citric acid and Lisinopril   Fall Risk Score: 2 (? 5 = High Risk)  MD Orders: evaluate and treat  MEDICAL/REFERRING DIAGNOSIS:  Low back pain [M54.5]   DATE OF ONSET:2017-after his 3rd lumbar surgery  REFERRING PHYSICIAN: Joshua GIRARD,*  RETURN PHYSICIAN APPOINTMENT:2018     INITIAL ASSESSMENT:  Mr. Lorrie Gonzalez is a 65 y.o. male presenting to physical therapy with complaints of B low back pain and tightness with radicular symptoms into L lower leg to L foot since he had his 3rd lumbar surgery in 2017-pain level is 3-4/10 with moderate lumbar paraspinal spasm with trigger points in both gluteus medius -flattened lumbar curve -level pelvis and level leg length -ambulates independently with minimal + antalgic gait -trunk range of motion is limited in B sidebending due to increased pain -weakness in B quads and hamstrings makes for falls precautions LE range of motion is wfl -very good candidate for skilled physical therapy to include manual therapy, pain modalities as needed and therapeutic exercises to include  lumbar exercises with LE strengthening  -motivated patient     PROBLEM LIST (Impacting functional limitations):  1. Decreased Strength  2. Decreased ADL/Functional Activities  3. Decreased Transfer Abilities  4. Decreased Ambulation Ability/Technique  5. Decreased Balance  6. Increased Pain  7. Decreased Activity Tolerance  8. Decreased Pacing Skills  9.  Decreased Flexibility/Joint Mobility INTERVENTIONS PLANNED:  1. Cold  2. Electrical Stimulation  3. Gait Training  4. Heat  5. Home Exercise Program (HEP)  6. Manual Therapy  7. Range of Motion (ROM)  8. Therapeutic Exercise/Strengthening   TREATMENT PLAN:  Effective Dates: 5-14-18 to 6-28-18  Frequency/Duration: 1 times a week for 6 weeks  GOALS: (Goals have been discussed and agreed upon with patient.)  Short-Term Functional Goals: Time Frame: 4-7-18  1. Low back pain is less than 3-4/10 to progress to DC goal-GOAL MET-PAIN AS LOW AS 2-3/10 AT REST  2. Decreased lumbar spasm -GOAL MET  3. More extended posture-ONGOING  Instruction in exercise program to allow increased ADLs. -GOAL MET  Discharge Goals: Time Frame:6-28-18  1. Low back pain is 2/10 to allow most home ADLs including light lifting-ONGOING  2. Trunk range of motion is 75% in all ranges to allow increased personal care ability including dressing and washing and in and out of car and driving -GOAL MET EXCEPT TRUNK EXTENSION IS 66%  3. Independent ambulation with minimal antalgic gait to allow walking community distances-GOAL MET  4. Able to sleep at least 6-7 hours without wakened by back pain -GOAL MET  5.  able to sit/drive for more than 1-2 hours -ONGOING  6.  Outcome measure score is improved from 25/50 to 15/27-[id number] SCORE IS 18/50    Patient has been seen for 14 visits of low back rehab from 3-26-18 to 6-25-18 with fair to good progress -pain as low as 2-3/10 with decreased lumbar spasm -LE strength is 3+ to 4-/5 in quads -independent ambulation with minimal antalgic gait -more erect posture with cues-flattened lumbar curve from numerous lumbar surgeries-independent with increased low back and LE exercises -motivated patient -feel he may benefit from a TENS unit for chronic low back pain as patient states he needs to be active caring for his wife who has Parkinsons and maybe early dementia per patient-outcome measure score is 18/50 at best -DC to home exercise program -motivated patient -goals partially met as patient still has increased low back pain with increased standing /walking-  Pleasant  gentleman to work with      Rehabilitation Potential For Stated Goals: Good  Regarding Oddis Asper therapy, I certify that the treatment plan above will be carried out by a therapist or under their direction. Thank you for this referral,  Ananya Puckett, PT     Referring Physician Signature: Wende Castleman,*              Date                    The information in this section was collected on 3-26-18 (except where otherwise noted). HISTORY:   History of Present Injury/Illness (Reason for Referral):  3rd lumbar surgery in July/2017-low back pain with LE weakness and radicular symptoms   Past Medical History/Comorbidities:   Mr. Rosi Esparza  has a past medical history of Aneurysm (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 He also has no past medical history of Adverse effect of anesthesia; Difficult intubation; Malignant hyperthermia due to anesthesia; Nausea & vomiting; or Pseudocholinesterase deficiency.   Mr. Rosi Esparza  has a past surgical history that includes hx lumbar fusion ([id number]); hx colonoscopy (Dec 2003); hx heart catheterization (2001 and 2003); pr left heart cath,percutaneous (2014); hx cataract removal; hx tonsillectomy; pr sinus surgery proc unlisted; hx heent; hx orthopaedic; hx hernia repair; and hx other surgical (08/2017). Social History/Living Environment:      Prior Level of Function/Work/Activity:  Independent with home ADLs but with increased low back pain and LE weakness  Current Medications:       Current Outpatient Prescriptions:     atorvastatin (LIPITOR) 40 mg tablet, TAKE 1 TABLET EVERY EVENING, Disp: 90 Tab, Rfl: 1    leflunomide (ARAVA) 20 mg tablet, TAKE 1 TABLET EVERY DAY, Disp: 90 Tab, Rfl: 1    metoprolol tartrate (LOPRESSOR) 25 mg tablet, Take 1 Tab by mouth two (2) times a day., Disp: 180 Tab, Rfl: 1    levothyroxine (SYNTHROID) 88 mcg tablet, TAKE 1 TAB BY MOUTH DAILY (BEFORE BREAKFAST). , Disp: 90 Tab, Rfl: 1    losartan (COZAAR) 50 mg tablet, Take 1 Tab by mouth daily (after dinner). , Disp: 90 Tab, Rfl: 1    HYDROcodone-acetaminophen (NORCO) 5-325 mg per tablet, Take 1-2 Tabs by mouth every six (6) hours as needed for Pain. Max Daily Amount: 8 Tabs., Disp: 20 Tab, Rfl: 0    naproxen sodium (ANAPROX DS) 550 mg tablet, Take 1 Tab by mouth two (2) times daily (with meals). , Disp: 20 Tab, Rfl: 0    fluticasone (FLONASE) 50 mcg/actuation nasal spray, 2 Sprays by Both Nostrils route as needed for Rhinitis., Disp: , Rfl:     clopidogrel (PLAVIX) 75 mg tab, TAKE 1 TABLET EVERY DAY, Disp: 90 Tab, Rfl: 3    nitroglycerin (NITROSTAT) 0.4 mg SL tablet, 1 Tab by SubLINGual route every five (5) minutes as needed for Chest Pain., Disp: 1 Bottle, Rfl: 3    INFLIXIMAB (REMICADE IV), by IntraVENous route. Every 8 weeks, Disp: , Rfl:     aspirin delayed-release 81 mg tablet, Take 81 mg by mouth nightly., Disp: , Rfl:     ranitidine (ZANTAC) 150 mg tablet, Take 150 mg by mouth as needed. , Disp: , Rfl:    Date Last Reviewed:  6/25/2018   Number of Personal Factors/Comorbidities that affect the Plan of Care:  (patient has a history of HTN, chronic pain ,3 lumbar surgeries, CAD, HTN, and GERD) 1-2: MODERATE COMPLEXITY   EXAMINATION:   Observation/Orthostatic Postural Assessment:          Ambulates with less forward head on neck posture with rounded shoulders and flattened lumbar curve   Palpation:         less tight B lumbar paraspinals and less  tender B gluteus medius   ROM:         LE range of motion is wfl     Trunk range of motion -    Flexion-80% with tight low back and hamstrings   Extension-66% with right side pain   B sidebending- 66% with increased R  low back pain   B rotation -80% with less guarding to the R    Strength: Ankles are ankles are 4-/5 except 3+ to 4-/5 in L foot inversion     R quads and hamstrings are 3+ to 4-/5     L quads and hamstrings are 3+ to 4-/5     R hip flexor is 4-/5     L hip flexor is  4-/5     Special Tests:          Negative spring/compression/valsalva/ negative straight leg raise test with tight hamstrings     Neurological Screen:    Radiating pain and tingling from L knee to L foot is decreased  Mental Status:        Alert and oriented      Body Structures Involved:  1. Bones  2. Joints  3. Muscles Body Functions Affected:  1. Sensory/Pain  2. Neuromusculoskeletal  3. Movement Related Activities and Participation Affected:  1. Learning and Applying Knowledge  2. General Tasks and Demands  3. Mobility  4. Self Care  5. Domestic Life   Number of elements (examined above) that affect the Plan of Care: 3: MODERATE COMPLEXITY   CLINICAL PRESENTATION:   Presentation: Evolving clinical presentation with changing clinical characteristics: MODERATE COMPLEXITY   CLINICAL DECISION MAKING:   Outcome Measure: Tool Used: Modified Oswestry Low Back Pain Questionnaire  Score:  Initial: 25/50  Most Recent: 20/50 (Date: 6-25-18-- )   Interpretation of Score: Each section is scored on a 0-5 scale, 5 representing the greatest disability. The scores of each section are added together for a total score of 50.     Score 0 1-10 11-20 21-30 31-40 41-49 50   Modifier CH CI CJ CK CL CM CN     ? Changing and Maintaining Body Position:        - GOAL STATUS: CJ - 20%-39% impaired, limited or restricted    - D/C STATUS:  CJ - 20%-39% impaired, limited or restricted    ?        :  Medical Necessity:   · DC to home exercise program  Reason for Services/Other Comments:  · DC to home program   Use of outcome tool(s) and clinical judgement create a POC that gives a:  (outcome measure illustrates up to 59% impairment ) Questionable prediction of patient's progress: MODERATE COMPLEXITY            TREATMENT:   (In addition to Assessment/Re-Assessment sessions the following treatments were rendered)  Pre-treatment Symptoms/Complaints:  Patient reports less pain and spasm but still gets increased low back discomfort with increased walking and standing -\"I use a grocery cart when I am in the store to help me walk/get around   . Pain: Initial:   Pain Intensity 1: 3 (3/10 at rest and 5-6/10 with increased walking/standing)  Pain Location 1: Back  Pain Orientation 1: Left, Lower, Right  Pain Intervention(s) 1: Exercise  Post Session: 1/10 -minimal complaints of pain after therapy -level pelvis -independent with exercises -DC to home program pushing LE strengthening     Therapeutic Exercise: ( ): x 38 minutes     Exercises per grid below to improve mobility, strength, balance and coordination. Required minimal verbal cues to promote proper body alignment, promote proper body posture and promote proper body mechanics. Progressed resistance, range, repetitions and complexity of movement as indicated.    Date:  6-12-18 Date:  6-25-18 Date:  5-31-18   Activity/Exercise Parameters Parameters Parameters   Knee to chest 3 x 30 sec reviewed 3 x 20 sec   hamstring Strap 4 x 15 sec reviewed Strap 3 x 20 sec   Piriformis stretch 4 x 20 sec reviewed 3 x 20 sec   Sit to stand  2 x 10  1 x 5 X 15  2 x 10   Corner stretch  5 x 15 sec     Backward shoulder shrugs      Abdominal bracing 2 x 10  2 x 10   SLR   2 x 10   Double knee to chest X 3     clamshells   2 x 10   Hip abduction Black 3 x 10 x 20 with black band Black band 3 x 10   Alternate knee lifts      ambulation      NU step 12 minutes level 4  10 minutes level 5   Gluteal sets      Hip adduction  X 20 with yellow theraball    Thoracic extension Sitting with half roll 2 x 10     Scapular retraction Sitting half roll 2 x 10     Low rows Red band 2 x 10     Calf stretch Incline 5 x 20 sec     Ankle pumps  2 x 30    Heel lifts  2 x 30     Marching in place  2 x 20 with 3 lbs    Long arc quads  4 x 10 with 3 lbs    Seated hamstring curls with eccentric knee extension  2 x 20 with green band                  Educated in and practiced proper posture    Manual Therapy (      soft tissue mobilization x 20 minutes to B lumbars and gluteus medius while in L sidelying position-effleurage and petrisage for tightness    Level pelvis      Therapeutic Modalities: for pain and edema                                                                                                      HEP: As above; handouts given to patient for all exercises. Treatment/Session Assessment:    · Response to Treatment:   Minimal spasm in low back with minimal pain at rest/after therapy -DC to home program   · Compliance with Program/Exercises: compliant most of the time.   · Recommendations/Intent for next treatment session: \"DC to home exercise program after 14 visits of therapy for lumbar rehab and LE strengthening -pleasant gentleman to work with   ·   Total Treatment Duration: 58 minutes     PT Patient Time In/Time Out  Time In: 0900  Time Out: [facility], PT

## 2020-01-01 ENCOUNTER — HOSPITAL ENCOUNTER (OUTPATIENT)
Dept: MRI IMAGING | Age: 80
Discharge: HOME | End: 2020-04-29
Attending: INTERNAL MEDICINE
Payer: MEDICARE

## 2020-01-01 ENCOUNTER — APPOINTMENT (OUTPATIENT)
Dept: PHYSICAL THERAPY | Age: 80
End: 2020-01-01
Payer: MEDICARE

## 2020-01-01 DIAGNOSIS — M54.12 ACUTE CERVICAL RADICULOPATHY: ICD-10-CM

## 2020-01-01 PROCEDURE — 72141 MRI NECK SPINE W/O DYE: CPT

## 2020-02-05 ENCOUNTER — HOSPITAL ENCOUNTER (OUTPATIENT)
Dept: PHYSICAL THERAPY | Age: 80
Discharge: HOME | End: 2020-02-05
Payer: MEDICARE

## 2020-02-05 DIAGNOSIS — R29.6 RECURRENT FALLS: ICD-10-CM

## 2020-02-05 PROCEDURE — 97162 PT EVAL MOD COMPLEX 30 MIN: CPT

## 2020-02-05 PROCEDURE — 97140 MANUAL THERAPY 1/> REGIONS: CPT

## 2020-02-06 NOTE — PROGRESS NOTES
Cuong Pugh  : 1940  [place]  Secondary: Eladio James 74 at [address], [place], [address] [place] [address]  [phone]   [phone]      OUTPATIENT PHYSICAL THERAPY: Daily Treatment Note 2020    ICD-10: Treatment Diagnosis: R29.6 recurrent falls                Treatment Diagnosis 2: M54.5 low back pain                 Treatment Diagnosis 3: R26.89 other abnormalities of gait and mobility Precautions: weak quads-falls precautions   Allergies: Citric acid and Lisinopril   TREATMENT PLAN:  Effective Dates: 2020 TO 2020 (60 days). Frequency/Duration: 2 times a week for 60 Day(s) MEDICAL/REFERRING DIAGNOSIS:  Recurrent falls [R29.6]   DATE OF ONSET: 10 years of increased low back pain with standing   REFERRING PHYSICIAN: Piyush Allred MD MD Orders: Evaluate and Treat   Return MD Appointment: unsure     Pre-treatment Symptoms/Complaints: My back is not bad if I am resting but once I am up walking for 5-10 minutes my low back pain increases    Pain: Initial: Pain Intensity 1: 2  Pain Location 1: Back  Pain Orientation 1: Left, Lower, Right  Pain Intervention(s) 1: Cold pack, Exercise, Heat applied, Massage  Post Session:  1/10 at rest and 4/10 with walking     at rest and 4/10 with standing/wakingMedications Last Reviewed:  2020  Updated Objective Findings:  See evaluation note from today   TREATMENT:   THERAPEUTIC EXERCISE: (5 minutes):     Reviewed in exercises and proper body mechanics     Exercises per grid below to improve mobility, strength, balance and coordination. Required minimal verbal cues to promote proper body alignment, promote proper body posture and promote proper body mechanics. Progressed resistance, range, repetitions and complexity of movement as indicated.      Date:  2020 Date:   Date:     Activity/Exercise Parameters Parameters Parameters   Knee to chest      Hamstring stretch      Piriformis stretch      Sit to stand                           Time spent with patient reviewing proper muscle recruitment and technique with exercises. MANUAL THERAPY: (25 minutes): Soft tissue mobilization was utilized and necessary because of the patient's restricted motion of soft tissue   Soft tissue mobilization to thoracolumbar paraspinals while in R sidelying position for tightness    MODALITIES: (10 minutes): *  Electrical Stimulation Therapy (4 electrodes to lumbar paraspinals ) was provided with intensity adjusted throughout treatment to patient tolerance. hot pack in comnjunction with muscle stimulation       *  Hot Pack Therapy in order to relieve muscle spasm. NO CHARGE FOR STIMULATION WITH HEAT    HEP: As above; handouts given to patient for all exercises. Treatment/Session Summary:    · Response to Treatment:  relief with clhy1dy therapy -less spasm . · Communication/Consultation:  HEP  · Equipment provided today:  None today  · Recommendations/Intent for next treatment session: Next visit will focus on LE strengthening with lumbar conditioning as tolerated .     Total Treatment Billable Duration:  Manual therapy x 25 minutes/evaluation x 20 minutes =45 minutes   PT Patient Time In/Time Out  Time In: 1000  Time Out: [address] [facility], PT

## 2020-02-06 NOTE — THERAPY EVALUATION
Iman King  : 1940  [place]  Secondary: Eladio James [address] at [facility]  [address] [place] [address], [place], [address] [place] [address]  [phone]   [phone]       OUTPATIENT PHYSICAL THERAPY:Initial Assessment 2020    ICD-10: Treatment Diagnosis: R29.6 recurrent falls                Treatment Diagnosis 2: M54.5 low back pain                 Treatment Diagnosis 3: R26.89 other abnormalities of gait and mobility   Precautions: weak quads -falls precautions  Allergies: Citric acid and Lisinopril   TREATMENT PLAN:  Effective Dates: 2020 TO 2020 (60 days). Frequency/Duration: 2 times a week for 60 Day(s) MEDICAL/REFERRING DIAGNOSIS:  Recurrent falls [R29.6]   DATE OF ONSET:on and off low back pain x 10 years -pain with standing/walking  REFERRING PHYSICIAN: Arlyne Severin, MD MD Orders: Evaluate and Treat   Return MD Appointment: unsure     INITIAL ASSESSMENT:  Mr. Maritza Metzger is a [de-identified] y.o. male presenting to physical therapy with complaints of insidious mild low back pain that increases to severe pain with standing/walking -pain at rest is 1-2/10 with walking pain as high as 8/10-decreased trunk flexibility with tight hamstrings-patient ambulates into dept without cane and minimal + antalgic gait  -weak LE's especially bilateral quads -LE range is wfl  -flattened lumbar curve noted- long scar in back from mid thoracic to gluteal cleft with history of 3 lumbar surgeries -tight lumbar paraspinals with trigger points in gluteus medius -very good candidate for skilled physical therapy to include manual therapy , pain modalities as needed , gait training and therapeutic exercises followed with cold pack     PROBLEM LIST (Impacting functional limitations):  1. Decreased Strength  2. Decreased ADL/Functional Activities  3. Decreased Transfer Abilities  4. Decreased Ambulation Ability/Technique  5. Decreased Balance  6.  Increased Pain  7. Decreased Activity Tolerance  8. Decreased Pacing Skills  9. Decreased Flexibility/Joint Mobility INTERVENTIONS PLANNED: (Treatment may consist of any combination of the following)  1. Cold  2. Gait Training  3. Heat  4. Home Exercise Program (HEP)  5. Manual Therapy  6. Range of Motion (ROM)  7. Therapeutic Exercise/Strengthening     GOALS: (Goals have been discussed and agreed upon with patient.)  Short-Term Functional Goals: Time Frame: 2/5/2020 to 2/19/2020  1. Patient demonstrates independence with home exercise program without verbal cueing provided by therapist.  2. Low back pain at rest  is less than 2/10 and less than 8/10 with walking to progress to DC goal   3. Quad strength is improved to 3+/5 to allow increased ADLs  4. Independent ambulation with minimal to minimal + antalgic gait   5. Trunk flexion improved to 75% with less tight hamstrings   Discharge Goals: Time Frame: 2/5/2020 to 4/5/2020  1. Minimal to no low back pain at rest and 4/10 pain with walking for at least 30-45 minutes  2. Quad strength is at least 3+ to 4-/5 to allow increased ADLs/easier coming from sit to stand   3. Independent ambulation with minimal antalgic gait to allow walking community distances   4. Stand/walk for at least 30-45 minutes without having to rest/or increased back pain   5. LEFS score is increased from 21/80 to 40/80    Outcome Measure: Tool Used: Lower Extremity Functional Scale (LEFS)  Score:  Initial: 21/80 Most Recent: X/80 (Date: -- )   Interpretation of Score: 20 questions each scored on a 5 point scale with 0 representing \"extreme difficulty or unable to perform\" and 4 representing \"no difficulty\". The lower the score, the greater the functional disability. 80/80 represents no disability. Minimal detectable change is 9 points.       Medical Necessity:   · Patient is expected to demonstrate progress in strength, range of motion, balance and coordination to increase independence with ADLs and improve safety during transfers and walking . · Skilled intervention continues to be required due to low back pain and weak LE's are affecting gait and function. Reason for Services/Other Comments:  · Patient continues to require modification of therapeutic interventions to increase complexity of exercises. Total Evaluation Duration: 25 minutes    Rehabilitation Potential For Stated Goals: Good  Regarding Gayle Mak's therapy, I certify that the treatment plan above will be carried out by a therapist or under their direction. Thank you for this referral,  Justin Chaney PT     Referring Physician Signature: Fanny Roca MD              Date                     PAIN/SUBJECTIVE:    Initial: Pain Intensity 1: 2  Pain Location 1: Back  Pain Orientation 1: Left, Lower, Right  Pain Intervention(s) 1: Cold pack, Exercise, Heat applied, Massage  Post Session:  1/10 at rest and 5/10 with walking     HISTORY:    History of Injury/Illness (Reason for Referral): Insidious low back pain with weak legs with history of 3 lumbar surgeries  Past Medical History/Comorbidities:   Mr. 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  He also has no past medical history of Adverse effect of anesthesia, Difficult intubation, Malignant hyperthermia due to anesthesia, Nausea & vomiting, or Pseudocholinesterase deficiency. Mr. Evette Couch  has a past surgical history that includes hx lumbar fusion (5008-9982); hx colonoscopy (Dec 2003); hx heart catheterization (2001 and 2003); pr left heart cath,percutaneous (2014); hx cataract removal; hx tonsillectomy; pr sinus surgery proc unlisted; hx heent; hx orthopaedic; hx hernia repair; and hx other surgical (08/2017). Social History/Living Environment:   Home Environment: Private residence  # Steps to Enter: 1  Rails to Enter: No  Wheelchair Ramp: No  One/Two Story Residence: One story  Living Alone: No  Support Systems: Spouse/Significant Other/Partner  Patient Expects to be Discharged to[de-identified] Private residence  Current DME Used/Available at Home: Cane, straight  Tub or Shower Type: Tub/Shower combination  Prior Level of Function/Work/Activity:  Independent with home ADLs     Ambulatory/Rehab Services H2 Model Falls Risk Assessment    Risk Factors:       (1)  Gender [Male] Ability to Rise from Chair:       (1)  Pushes up, successful in one attempt    Falls Prevention Plan:       Exercise/Equipment Adaptation (specify):  uses a cane as needed    Total: (5 or greater = High Risk): 2    ©2010 [facility] Patent #[id number]. Federal Law prohibits the replication, distribution or use without written permission from [facility] of [address] [place] [address]    Current Medications:        Current Outpatient Medications:     predniSONE (STERAPRED DS) 10 mg dose pack, See package instruction, Disp: 21 Tab, Rfl: 0    atorvastatin (LIPITOR) 40 mg tablet, TAKE 1 TABLET EVERY EVENING, Disp: 90 Tab, Rfl: 1    levothyroxine (SYNTHROID) 100 mcg tablet, Take 1 Tab by mouth Daily (before breakfast). , Disp: 90 Tab, Rfl: 1    losartan (COZAAR) 50 mg tablet, Take 1 Tab by mouth daily (after dinner). , Disp: 90 Tab, Rfl: 1    leflunomide (ARAVA) 20 mg tablet, TAKE 1 TABLET EVERY DAY, Disp: 90 Tab, Rfl: 1    nitroglycerin (NITROSTAT) 0.4 mg SL tablet, 1 Tab by SubLINGual route every five (5) minutes as needed for Chest Pain., Disp: 1 Bottle, Rfl: 3    clopidogrel (PLAVIX) 75 mg tab, TAKE 1 TABLET EVERY DAY, Disp: 90 Tab, Rfl: 3    fluticasone (FLONASE) 50 mcg/actuation nasal spray, 2 Sprays by Both Nostrils route as needed for Rhinitis., Disp: 1 Bottle, Rfl: 3    INFLIXIMAB (REMICADE IV), by IntraVENous route. Every 8 weeks, Disp: , Rfl:     aspirin delayed-release 81 mg tablet, Take 81 mg by mouth nightly., Disp: , Rfl:     ranitidine (ZANTAC) 150 mg tablet, Take 150 mg by mouth as needed. , Disp: , Rfl:     Date Last Reviewed:  2/5/2020    Number of Personal Factors/Comorbidities that affect the Plan of Care-history of CAD, Cancer, and GERD: 1-2: MODERATE COMPLEXITY    EXAMINATION:    Observation/Orthostatic Postural Assessment:          Patient has rounded shoulders and forward head on neck posture     Palpation:      Tight lumbar paraspinals and gluteus medius with flattened lumbar curve           ROM:        LE range is wfl     Trunk range of motion -    Flexion -66% with tight hamstrings   Extension 75%  Bilateral sidebending -75%  Bilateral rotation-85%    Mild guarding with motions    Strength: Ankle dorsiflexors and plantarflexors are 4-/5 /evertors and invertors are 3+/5     Quads and hamstrings are 3 to 3+/5 at best     Hip flexors are 4-/5   Special Tests:          Negative spring/compression/stork/valsalva/negative straight leg raise test with tight hamstrings     Neurological Screen:  Decreased sensation from mid thigh to toes per patient with 3 lumbar surgeries   Balance:        Good in sitting-    Fair in standing without cane   Mental Status:        Alert and oriented   Sensation:      Intact to light touch     Body Structures Involved:  1. Bones  2. Joints  3.  Muscles Body Functions Affected:  1. Sensory/Pain  2. Neuromusculoskeletal  3. Movement Related Activities and Participation Affected:  1. Learning and Applying Knowledge  2. General Tasks and Demands  3. Mobility  4. Self Care  5. Domestic Life    Number of elements (examined above) that affect the Plan of Care: 3: MODERATE COMPLEXITY    CLINICAL PRESENTATION:    Presentation: Evolving clinical presentation with changing clinical characteristics: MODERATE COMPLEXITY    CLINICAL DECISION MAKING:    Use of outcome tool(s) and clinical judgement create a POC that gives a-impairment of at least 60% but less than 80% :  Questionable prediction of patient's progress: MODERATE COMPLEXITY

## 2020-02-10 ENCOUNTER — HOSPITAL ENCOUNTER (OUTPATIENT)
Dept: PHYSICAL THERAPY | Age: 80
Discharge: HOME | End: 2020-02-10
Payer: MEDICARE

## 2020-02-10 PROCEDURE — 97110 THERAPEUTIC EXERCISES: CPT

## 2020-02-10 PROCEDURE — 97140 MANUAL THERAPY 1/> REGIONS: CPT

## 2020-02-10 NOTE — PROGRESS NOTES
Isabel Stone  : 1940  [place]  Secondary: Eladio James [address] at [address], [address] [place] [address] Street  [phone]   [phone]      OUTPATIENT PHYSICAL THERAPY: Daily Treatment Note 2/10/2020    ICD-10: Treatment Diagnosis: R29.6 recurrent falls                Treatment Diagnosis 2: M54.5 low back pain                 Treatment Diagnosis 3: R26.89 other abnormalities of gait and mobility Precautions: weak quads-falls precautions   Allergies: Citric acid and Lisinopril   TREATMENT PLAN:  Effective Dates: 2020 TO 2020 (60 days). Frequency/Duration: 2 times a week for 60 Day(s) MEDICAL/REFERRING DIAGNOSIS:  Repeated falls [R29.6]   DATE OF ONSET: 10 years of increased low back pain with standing   REFERRING PHYSICIAN: Kasi Gamble MD MD Orders: Evaluate and Treat   Return MD Appointment: unsure     Pre-treatment Symptoms/Complaints: My back is not bad today but my R neck is very sore -I don't know if I slept wrong on it    Pain: Initial: Pain Intensity 1: 1  Pain Location 1: Back, Hip  Pain Orientation 1: Lower, Left, Right  Pain Intervention(s) 1: Exercise  Post Session:0-1/10 at rest and 3/10 with walking with cane    at rest and 4/10 with standing/wakingMedications Last Reviewed:  2/10/2020  Updated Objective Findings:cues with cane assisted ambulation-   TREATMENT:   THERAPEUTIC EXERCISE: (45 minutes):     Reviewed in exercises and proper body mechanics     Exercises per grid below to improve mobility, strength, balance and coordination. Required minimal verbal cues to promote proper body alignment, promote proper body posture and promote proper body mechanics. Progressed resistance, range, repetitions and complexity of movement as indicated.      Date:  2020 Date:  2- Date:     Activity/Exercise Parameters Parameters Parameters   Knee to chest  4 x 30 seconds with towel Hamstring stretch  4 x 30 seconds with belt    Piriformis stretch  4 x 30 seconds    Sit to stand   2 x 6    Ankle pumps  X 20 to each LE     Heel lifts  X 20 to each LE     Marching in place   2 x 10 to each LE with green band    Hip abduction-sitting  X 20 with green band    Standing hip abductionn  2 x 10 to each LE with green band    Standing hip flexion  2 x 10 to each LE with green band    Standing hip extension  2 x 10 to each LE with green band    Long arc quads  X 20 to each LE with 3 lbs    Seated hamstring curls  X 20 to each LE  With 3 lbs    Gait training  X 4 minutes in hallway with cane       Time spent with patient reviewing proper muscle recruitment and technique with exercises. MANUAL THERAPY: (12 minutes): Soft tissue mobilization was utilized and necessary because of the patient's restricted motion of soft tissue   Soft tissue mobilization to thoracolumbar paraspinals while in R sidelying position for tightness    MODALITIES: (0 minutes): *  Electrical Stimulation Therapy (4 electrodes to lumbar paraspinals ) was provided with intensity adjusted throughout treatment to patient tolerance. hot pack in comnjunction with muscle stimulation       *  Hot Pack Therapy in order to relieve muscle spasm. -not today    NO CHARGE FOR STIMULATION WITH HEAT    HEP: As above; handouts given to patient for all exercises. Treatment/Session Summary:    · Response to Treatment: performed exercises well -needed cues with proper cane gait  relief with [id number] therapy -less spasm .   · Communication/Consultation:  HEP  · Equipment provided today:  None today  · Recommendations/Intent for next treatment session: Next visit will focus on LE strengthening with lumbar conditioning as tolerated .Teena Escobedo training    Total Treatment Billable Duration:  57 minutes   PT Patient Time In/Time Out  Time In: 1000  Time Out: [address], PT

## 2020-02-10 NOTE — PROGRESS NOTES
Patient reported he did not want another appointment this week due to other scheduled appointments. Will resume 2 times a week next week.     Jory Barber DPT

## 2020-02-18 ENCOUNTER — HOSPITAL ENCOUNTER (OUTPATIENT)
Dept: PHYSICAL THERAPY | Age: 80
Discharge: HOME | End: 2020-02-18
Payer: MEDICARE

## 2020-02-18 PROCEDURE — 97035 APP MDLTY 1+ULTRASOUND EA 15: CPT

## 2020-02-18 PROCEDURE — 97140 MANUAL THERAPY 1/> REGIONS: CPT

## 2020-02-18 PROCEDURE — 97110 THERAPEUTIC EXERCISES: CPT

## 2020-02-18 NOTE — PROGRESS NOTES
Lynnette Segovia  : 1940  [place]  Secondary: Eladio James [address] at [address], [place], [address] [place] [address]  [phone]   [phone]      OUTPATIENT PHYSICAL THERAPY: Daily Treatment Note 2020    ICD-10: Treatment Diagnosis: R29.6 recurrent falls                Treatment Diagnosis 2: M54.5 low back pain                 Treatment Diagnosis 3: R26.89 other abnormalities of gait and mobility Precautions: weak quads-falls precautions   Allergies: Citric acid and Lisinopril   TREATMENT PLAN:  Effective Dates: 2020 TO 2020 (60 days). Frequency/Duration: 2 times a week for 60 Day(s) MEDICAL/REFERRING DIAGNOSIS:  Repeated falls [R29.6]   DATE OF ONSET: 10 years of increased low back pain with standing   REFERRING PHYSICIAN: Princess Tiera MD MD Orders: Evaluate and Treat   Return MD Appointment: unsure     Pre-treatment Symptoms/Complaints: My back is doing okay but my R neck area is my main problem -I do not know if I slept wrong on it -    Pain: Initial: Pain Intensity 1: 1  Pain Location 1: Neck  Pain Orientation 1: Lower, Right  Pain Intervention(s) 1: Massage  Post Session:0-1/10 at rest and 3/10 with walking with cane    at rest and 4/10 with standing/wakingMedications Last Reviewed:  2020  Updated Objective Findings:ambulates into dept without cane -   TREATMENT:   THERAPEUTIC EXERCISE: (20 minutes):     Reviewed in exercises and proper body mechanics     Exercises per grid below to improve mobility, strength, balance and coordination. Required minimal verbal cues to promote proper body alignment, promote proper body posture and promote proper body mechanics. Progressed resistance, range, repetitions and complexity of movement as indicated.      Date:  2020 Date:  2- Date:  20   Activity/Exercise Parameters Parameters Parameters   Knee to chest  4 x 30 seconds with towel Hamstring stretch  4 x 30 seconds with belt    Piriformis stretch  4 x 30 seconds    Sit to stand   2 x 6 2 x 10   Ankle pumps  X 20 to each LE  X 20 to each LE    Heel lifts  X 20 to each LE  X 20 to each LE   Marching in place   2 x 10 to each LE with green band 2 x 10 to each LE with green band   Hip abduction-sitting  X 20 with green band X 20 with green band   Standing hip abductionn  2 x 10 to each LE with green band    Standing hip flexion  2 x 10 to each LE with green band    Standing hip extension  2 x 10 to each LE with green band    Long arc quads  X 20 to each LE with 3 lbs 2 x 10 to each LE with 4 lbs   Seated hamstring curls  X 20 to each LE  With 3 lbs 2 x 10 to each LE with blue band   Gait training  X 4 minutes in hallway with cane       Time spent with patient reviewing proper muscle recruitment and technique with exercises. MANUAL THERAPY: (28 minutes): Soft tissue mobilization was utilized and necessary because of the patient's restricted motion of soft tissue   Soft tissue mobilization to R cervicals and upper trapezius while seated and manual cervical traction with occipital release with mild cervical mobilization while supine     MODALITIES: (10 minutes): *  Electrical Stimulation Therapy (4 electrodes to lumbar paraspinals ) was provided with intensity adjusted throughout treatment to patient tolerance. hot pack in comnjunction with muscle stimulation       *  Hot Pack Therapy in order to relieve muscle spasm. -not today    Ultrasound x 10 minutes to R cervicals and upper trapezius while seated for pain and inflammation    HEP: As above; handouts given to patient for all exercises. Treatment/Session Summary:    · Response to Treatment: performed LE exercises well -needs to strengthen quads for gait -less neck pain with therapy - relief with [id number] therapy -less spasm .   · Communication/Consultation:  HEP  · Equipment provided today:  None today  · Recommendations/Intent for next treatment session: Next visit will focus on LE strengthening with lumbar conditioning as tolerated .Edgar Cordoba training-monitor neck pain     Total Treatment Billable Duration:  58 minutes   PT Patient Time In/Time Out  Time In: 1630  Time Out: [address], PT

## 2020-02-24 ENCOUNTER — HOSPITAL ENCOUNTER (OUTPATIENT)
Dept: PHYSICAL THERAPY | Age: 80
Discharge: HOME | End: 2020-02-24
Payer: MEDICARE

## 2020-02-24 PROCEDURE — 97140 MANUAL THERAPY 1/> REGIONS: CPT

## 2020-02-24 PROCEDURE — 97110 THERAPEUTIC EXERCISES: CPT

## 2020-02-24 PROCEDURE — 97035 APP MDLTY 1+ULTRASOUND EA 15: CPT

## 2020-02-24 NOTE — PROGRESS NOTES
Shayne Born  : 1940  [place]  Secondary: Eladio James [address] at [address], [place], [address] [place] [address]  [phone]   [phone]      OUTPATIENT PHYSICAL THERAPY: Daily Treatment Note 2020    ICD-10: Treatment Diagnosis: R29.6 recurrent falls                Treatment Diagnosis 2: M54.5 low back pain                 Treatment Diagnosis 3: R26.89 other abnormalities of gait and mobility Precautions: weak quads-falls precautions   Allergies: Citric acid and Lisinopril   TREATMENT PLAN:  Effective Dates: 2020 TO 2020 (60 days). Frequency/Duration: 2 times a week for 60 Day(s) MEDICAL/REFERRING DIAGNOSIS:  Repeated falls [R29.6]   DATE OF ONSET: 10 years of increased low back pain with standing   REFERRING PHYSICIAN: Felipe Trevizo MD MD Orders: Evaluate and Treat   Return MD Appointment: unsure     Pre-treatment Symptoms/Complaints: Pt.'s chief complaint was his neck, back, & shoulders. Pain: Initial: Pain Intensity 1: 8  Pain Location 1: Neck  Pain Orientation 1: Lower, Left, Right  Pain Intervention(s) 1: Medication (see MAR), Exercise, Heat applied  Post Session: 5/10 less pain and tightness    at rest and 4/10 with standing/wakingMedications Last Reviewed:  2020  Updated Objective Findings: rounded posture   TREATMENT:   THERAPEUTIC EXERCISE: (20 minutes):     Reviewed in exercises and proper body mechanics     Exercises per grid below to improve mobility, strength, balance and coordination. Required minimal verbal cues to promote proper body alignment, promote proper body posture and promote proper body mechanics. Progressed resistance, range, repetitions and complexity of movement as indicated.      Date:  20 Date:  2- Date:  20   Activity/Exercise Parameters Parameters Parameters   Knee to chest ------ 4 x 30 seconds with towel     Hamstring stretch ------- 4 x 30 seconds with belt    Piriformis stretch ------ 4 x 30 seconds    Sit to stand  ------- 2 x 6 2 x 10   Ankle pumps -------- X 20 to each LE  X 20 to each LE    Heel lifts ------ X 20 to each LE  X 20 to each LE   Marching in place  ------ 2 x 10 to each LE with green band 2 x 10 to each LE with green band   Hip abduction-sitting ------- X 20 with green band X 20 with green band   Standing hip abductionn ---------------- 2 x 10 to each LE with green band    Standing hip flexion -------------- 2 x 10 to each LE with green band    Standing hip extension -------- 2 x 10 to each LE with green band    Long arc quads ------- X 20 to each LE with 3 lbs 2 x 10 to each LE with 4 lbs   Seated hamstring curls --------- X 20 to each LE  With 3 lbs 2 x 10 to each LE with blue band   Gait training ----- X 4 minutes in hallway with cane  --------   Upper trap stretch 4x30 sec hold bilaterally     Levator scapulae stretch  4x30 sec hold bilaterally     Chin tucks  X 10 reps      Postural training Blue bolster behind mid back in chair x 10 mins      Shoulder rows Green band 2x10 reps 5 sec hold each     Shoulder low rows Green band 2x10 reps 5 sec hold each                    Time spent with patient reviewing proper muscle recruitment and technique with exercises. MANUAL THERAPY: (30 minutes): Soft tissue mobilization was utilized and necessary because of the patient's restricted motion of soft tissue   Soft tissue mobilization to bilateral cervical paraspinals and upper traps    Trigger point release in paraspinals to decrease pain and cervical tightness   Gentle manual cervical stretching. MODALITIES: (10 minutes):      Pt. received pulsed ultrasound at 15 cm2 x 10 mins to bilateral cervical parespinals and upper traps with ultrasound gel and biofreeze to decrease pain and tightness. (DO NOT USE BIOFREEZE PT. DID NOT LIKE IT)         HEP: As above; handouts given to patient for all exercises.     Treatment/Session Summary: · Response to Treatment: Pt. persented with better posture and less tighness in cervical spine. Pt. Stated his neck and shoulders were his biggest pain issue today.   · Communication/Consultation:  HEP  · Equipment provided today:  None today  · Recommendations/Intent for next treatment session: Next visit will focus on LE strengthening with lumbar conditioning as tolerated .Neymar Huang training-monitor neck pain     Total Treatment Billable Duration:  60  minutes   PT Patient Time In/Time Out  Time In: 1430  Time Out: [place], [place]

## 2020-03-03 ENCOUNTER — HOSPITAL ENCOUNTER (OUTPATIENT)
Dept: PHYSICAL THERAPY | Age: 80
Discharge: HOME | End: 2020-03-03
Payer: MEDICARE

## 2020-03-03 PROCEDURE — 97110 THERAPEUTIC EXERCISES: CPT

## 2020-03-03 PROCEDURE — 97140 MANUAL THERAPY 1/> REGIONS: CPT

## 2020-03-03 PROCEDURE — 97035 APP MDLTY 1+ULTRASOUND EA 15: CPT

## 2020-03-03 NOTE — PROGRESS NOTES
Lowell Bundy  : 1940  [place]  Secondary: Eladoi James 74 at [address], [place], [address] [place] [address]  [phone]   [facility] (formerly [facility])[3]:[phone]      OUTPATIENT PHYSICAL THERAPY: Daily Treatment Note 3/3/2020    ICD-10: Treatment Diagnosis: R29.6 recurrent falls                Treatment Diagnosis 2: M54.5 low back pain                 Treatment Diagnosis 3: R26.89 other abnormalities of gait and mobility Precautions: weak quads-falls precautions   Allergies: Citric acid and Lisinopril   TREATMENT PLAN:  Effective Dates: 2020 TO 2020 (60 days). Frequency/Duration: 2 times a week for 60 Day(s) MEDICAL/REFERRING DIAGNOSIS:  Repeated falls [R29.6]   DATE OF ONSET: 10 years of increased low back pain with standing   REFERRING PHYSICIAN: Alyssa Villafana MD MD Orders: Evaluate and Treat   Return MD Appointment: unsure     Pre-treatment Symptoms/Complaints: Pt. Reports his back is a little better. Main C/O is right sided neck pain with radiating pain into shoulder and up to his ear. Pain: Initial: Pain Intensity 1: 8  Pain Location 1: Neck  Pain Orientation 1: Lower, Upper, Right  Post Session: 5/10 less pain and tightness    at rest and 4/10 with standing/wakingMedications Last Reviewed:  3/3/2020  Updated Objective Findings: rounded posture   TREATMENT:   THERAPEUTIC EXERCISE: (25 minutes):     Reviewed in exercises and proper body mechanics     Exercises per grid below to improve mobility, strength, balance and coordination. Required minimal verbal cues to promote proper body alignment, promote proper body posture and promote proper body mechanics. Progressed resistance, range, repetitions and complexity of movement as indicated.      Date:  20 Date:  3-3-20 Date:  20   Activity/Exercise Parameters Parameters Parameters   Knee to chest ------ 4 x 30 seconds      Hamstring stretch ------- 4 x 30 seconds with belt    Piriformis stretch ------ 4 x 30 seconds    Sit to stand  ------- 2 x 10 2 x 10   Ankle pumps --------  X 20 to each LE    Heel lifts ------  X 20 to each LE   Marching in place  ------  2 x 10 to each LE with green band   Hip abduction-sitting -------  X 20 with green band   Standing hip abductionn ----------------     Standing hip flexion --------------     Standing hip extension --------     Long arc quads -------  2 x 10 to each LE with 4 lbs   Seated hamstring curls ---------  2 x 10 to each LE with blue band   Gait training -----  --------   Upper trap stretch 4x30 sec hold bilaterally 4 x 30 sec    Levator scapulae stretch  4x30 sec hold bilaterally 3 x 30 sec    Chin tucks  X 10 reps      Postural training Blue bolster behind mid back in chair x 10 mins  Towel roll 5 minutes     Shoulder rows Green band 2x10 reps 5 sec hold each Green 1 x 15    Shoulder low rows Green band 2x10 reps 5 sec hold each  Red 1 x 15    Scapular retraction  2 x 10 with manual cues            Time spent with patient reviewing proper muscle recruitment and technique with exercises. MANUAL THERAPY: (20 minutes): Soft tissue mobilization was utilized and necessary because of the patient's restricted motion of soft tissue   Soft tissue mobilization to bilateral cervical paraspinals and upper traps    Trigger point release in paraspinals to decrease pain and cervical tightness   Gentle manual cervical stretching. MODALITIES: (10 minutes):      Pt. received pulsed ultrasound at 15 cm2 x 10 mins to bilateral cervical parespinals and upper traps with ultrasound gel and biofreeze to decrease pain and tightness. (DO NOT USE BIOFREEZE PT. DID NOT LIKE IT)     HEP: As above; handouts given to patient for all exercises. Treatment/Session Summary:    · Response to Treatment: immediate relief after manual therapy and US however pain returned by end of session.    · Communication/Consultation:  None today  · Equipment provided today:  None today  · Recommendations/Intent for next treatment session: Next visit will focus on LE strengthening with lumbar conditioning as tolerated .Neymar Huang training-monitor neck pain     Total Treatment Billable Duration:  55  minutes   PT Patient Time In/Time Out  Time In: 3627  Time Out: 629 [facility]

## 2020-03-05 ENCOUNTER — HOSPITAL ENCOUNTER (OUTPATIENT)
Dept: PHYSICAL THERAPY | Age: 80
Discharge: HOME | End: 2020-03-05
Payer: MEDICARE

## 2020-03-05 PROCEDURE — 97035 APP MDLTY 1+ULTRASOUND EA 15: CPT

## 2020-03-05 PROCEDURE — 97110 THERAPEUTIC EXERCISES: CPT

## 2020-03-05 PROCEDURE — 97140 MANUAL THERAPY 1/> REGIONS: CPT

## 2020-03-05 NOTE — PROGRESS NOTES
Whitley Render  : 1940  [place]  Secondary: Eladio James 74 at [address], [place], [address] [place] [address]  [phone]   [phone]      OUTPATIENT PHYSICAL THERAPY: Daily Treatment Note 3/5/2020    ICD-10: Treatment Diagnosis: R29.6 recurrent falls                Treatment Diagnosis 2: M54.5 low back pain                 Treatment Diagnosis 3: R26.89 other abnormalities of gait and mobility Precautions: weak quads-falls precautions   Allergies: Citric acid and Lisinopril   TREATMENT PLAN:  Effective Dates: 2020 TO 2020 (60 days). Frequency/Duration: 2 times a week for 60 Day(s) MEDICAL/REFERRING DIAGNOSIS:  Repeated falls [R29.6]   DATE OF ONSET: 10 years of increased low back pain with standing   REFERRING PHYSICIAN: Yoana Dumont MD MD Orders: Evaluate and Treat   Return MD Appointment: unsure     Pre-treatment Symptoms/Complaints: Pt. Reports decreased back pain. Main C/O is right sided neck pain with radiating pain into shoulder and up to his ear. Pain: Initial: Pain Intensity 1: 6  Pain Location 1: Neck  Pain Orientation 1: Right, Upper, Lower  Post Session: 3/10     at rest and 4/10 with standing/wakingMedications Last Reviewed:  3/5/2020  Updated Objective Findings: None today   TREATMENT:   THERAPEUTIC EXERCISE: (28 minutes):     Reviewed in exercises and proper body mechanics     Exercises per grid below to improve mobility, strength, balance and coordination. Required minimal verbal cues to promote proper body alignment, promote proper body posture and promote proper body mechanics. Progressed resistance, range, repetitions and complexity of movement as indicated.      Date:  20 Date:  3-3-20 Date:  3-5-20   Activity/Exercise Parameters Parameters Parameters   Knee to chest ------ 4 x 30 seconds      Hamstring stretch ------- 4 x 30 seconds with belt    Piriformis stretch ------ 4 x 30 seconds    Sit to stand  ------- 2 x 10 2 x10   Ankle pumps --------     Heel lifts ------     Marching in place  ------     Hip abduction-sitting -------     Standing hip abductionn ----------------     Standing hip flexion --------------     Standing hip extension --------     Long arc quads -------     Seated hamstring curls ---------     Gait training -----  --------   Upper trap stretch 4x30 sec hold bilaterally 4 x 30 sec 3 x 30 sec   Levator scapulae stretch  4x30 sec hold bilaterally 3 x 30 sec 3 x 30 sec   Chin tucks  X 10 reps   1 x 10   Postural training Blue bolster behind mid back in chair x 10 mins  Towel roll 5 minutes  5 minutes   Shoulder rows Green band 2x10 reps 5 sec hold each Green 1 x 15 Red 2 x 10   Shoulder low rows Green band 2x10 reps 5 sec hold each  Red 1 x 15 Yellow 2 x 10   Scapular retraction  2 x 10 with manual cues Towel roll 2 x 10   Thoracic extension   Towel horizontal 2 x 10   Horizontal abduction   Yellow 2 x 10 seated           Time spent with patient reviewing proper muscle recruitment and technique with exercises. MANUAL THERAPY: (33 minutes): Soft tissue mobilization was utilized and necessary because of the patient's restricted motion of soft tissue   Soft tissue mobilization to bilateral cervical paraspinals and upper traps    Trigger point release in paraspinals to decrease pain and cervical tightness   Gentle manual cervical stretching. MHP x 5 minutes to cervical region sitting    MODALITIES: (10 minutes):      Pt. received pulsed ultrasound at 15 cm2 x 10 mins to bilateral cervical parespinals and upper traps with ultrasound gel and biofreeze to decrease pain and tightness. (DO NOT USE BIOFREEZE PT. DID NOT LIKE IT)     HEP: As above; handouts given to patient for all exercises. Treatment/Session Summary:    · Response to Treatment: decreased neck pain and tightness.    · Communication/Consultation:  None today  · Equipment provided today:  None today  · Recommendations/Intent for next treatment session: Next visit will focus on LE strengthening with lumbar conditioning as tolerated .Abbe Copa training-monitor neck pain     Total Treatment Billable Duration:  53  minutes   PT Patient Time In/Time Out  Time In: 2184  Time Out: 209 [address]

## 2020-03-10 ENCOUNTER — APPOINTMENT (OUTPATIENT)
Dept: PHYSICAL THERAPY | Age: 80
End: 2020-03-10
Payer: MEDICARE

## 2020-03-10 NOTE — THERAPY DISCHARGE
Adrian Capellan  : 1940  [place]  Secondary: Eladio James 74 at [facility]  [address] [facility], [place], [address] [place] [address]  [phone]   [phone]       OUTPATIENT PHYSICAL [facility] 3/10/2020    ICD-10: Treatment Diagnosis: R29.6 recurrent falls                Treatment Diagnosis 2: M54.5 low back pain                 Treatment Diagnosis 3: R26.89 other abnormalities of gait and mobility   Precautions: weak quads -falls precautions  Allergies: Citric acid and Lisinopril   TREATMENT PLAN:  Effective Dates: 2020 TO 2020 (60 days).     Frequency/Duration: 2 times a week for 60 Day(s) MEDICAL/REFERRING DIAGNOSIS:  Repeated falls [R29.6]   DATE OF ONSET:on and off low back pain x 10 years -pain with standing/walking  REFERRING PHYSICIAN: America Figueroa MD MD Orders: Evaluate and Treat   Return MD Appointment: unsure     INITIAL ASSESSMENT:  Mr. Dot Murphy is a [de-identified] y.o. male presenting to physical therapy with complaints of insidious mild low back pain that increases to severe pain with standing/walking -pain at rest is 1-2/10 with walking pain as high as 8/10-decreased trunk flexibility with tight hamstrings-patient ambulates into dept without cane and minimal + antalgic gait  -weak LE's especially bilateral quads -LE range is wfl  -flattened lumbar curve noted- long scar in back from mid thoracic to gluteal cleft with history of 3 lumbar surgeries -tight lumbar paraspinals with trigger points in gluteus medius -very good candidate for skilled physical therapy to include manual therapy , pain modalities as needed , gait training and therapeutic exercises followed with cold pack      Patient was seen for 5 visits of lumbar rehab with good progress from 2-5-20 to 3-5-20 -pain level in low back was as low as 2/10 with main pain complaint being in his right neck area as he felt he may have slept wrong on his neck --trunk range of motion was wfl with mild stiffness/guarding -independent ambulation with minimal antalgic gait and independent with home exercise program-decreased lumbar spasm -patient asked to discontinue his rehab at this time due to fear of the corona virus -DC to home exercise program -pleasant gentleman to work with    PROBLEM LIST (Impacting functional limitations):  1. Decreased Strength  2. Decreased ADL/Functional Activities  3. Decreased Transfer Abilities  4. Decreased Ambulation Ability/Technique  5. Decreased Balance  6. Increased Pain  7. Decreased Activity Tolerance  8. Decreased Pacing Skills  9. Decreased Flexibility/Joint Mobility INTERVENTIONS PLANNED: (Treatment may consist of any combination of the following)  1. Cold  2. Gait Training  3. Heat  4. Home Exercise Program (HEP)  5. Manual Therapy  6. Range of Motion (ROM)  7. Therapeutic Exercise/Strengthening     GOALS: (Goals have been discussed and agreed upon with patient.)  Short-Term Functional Goals: Time Frame: 2/5/2020 to 2/19/2020  1. Patient demonstrates independence with home exercise program without verbal cueing provided by therapist.-GOAL MET  2. Low back pain at rest  is less than 2/10 and less than 8/10 with walking to progress to DC goal -GOAL MET  3. Quad strength is improved to 3+/5 to allow increased ADLs-GOAL MET  4. Independent ambulation with minimal to minimal + antalgic gait -GOAL MET  5. Trunk flexion improved to 75% with less tight hamstrings -GOAL MET    DISCHARGE GOALS NOT MET AS PATIENT ASKED TO BE DISCHARGED EARLY DUE TO CORONA VIRUS CONCERNS     ischarge   Discharge Goals: Time Frame: 2/5/2020 to 4/5/2020  1. Minimal to no low back pain at rest and 4/10 pain with walking for at least 30-45 minutes  2. Quad strength is at least 3+ to 4-/5 to allow increased ADLs/easier coming from sit to stand   3. Independent ambulation with minimal antalgic gait to allow walking community distances   4.  Stand/walk for at least 30-45 minutes without having to rest/or increased back pain   5. LEFS score is increased from 21/80 to 40/80    Outcome Measure: Tool Used: Lower Extremity Functional Scale (LEFS)  Score:  Initial: 21/80 Most Recent: X/80 (Date: -- )   Interpretation of Score: 20 questions each scored on a 5 point scale with 0 representing \"extreme difficulty or unable to perform\" and 4 representing \"no difficulty\". The lower the score, the greater the functional disability. 80/80 represents no disability. Minimal detectable change is 9 points. Observation/Orthostatic Postural Assessment:          Patient has less rounded shoulders and forward head on neck posture     Palpation:      Less tight  lumbar paraspinals and gluteus medius with flattened lumbar curve           ROM:        LE range is wfl     Trunk range of motion -    Flexion -80% with less  tight hamstrings   Extension 75%  Bilateral sidebending -80%  Bilateral rotation-90%    Mild guarding with motions is decreased    Strength: Ankle dorsiflexors and plantarflexors are 4-/5 /evertors and invertors are 3+/5     Quads and hamstrings are  3+/5      Hip flexors are 4-/5   Special Tests:          Negative spring/compression/stork/valsalva/negative straight leg raise test with tight hamstrings     Neurological Screen:  Decreased sensation from mid thigh to toes per patient with 3 lumbar surgeries   Balance:        Good in sitting-    Fair in standing without cane   Mental Status:        Alert and oriented   Sensation:      Intact to light touch        Medical Necessity:   · DC to home exercise program  Reason for Services/Other Comments:  · DC to home program       Rehabilitation Potential For Stated Goals: Good  Regarding Alaina Mak's therapy, I certify that the treatment plan above will be carried out by a therapist or under their direction.   Thank you for this referral,  Jason Stahl, PT                  PAIN/SUBJECTIVE:

## 2020-03-12 ENCOUNTER — APPOINTMENT (OUTPATIENT)
Dept: PHYSICAL THERAPY | Age: 80
End: 2020-03-12
Payer: MEDICARE

## 2020-03-17 ENCOUNTER — APPOINTMENT (OUTPATIENT)
Dept: PHYSICAL THERAPY | Age: 80
End: 2020-03-17
Payer: MEDICARE

## 2020-03-19 ENCOUNTER — APPOINTMENT (OUTPATIENT)
Dept: PHYSICAL THERAPY | Age: 80
End: 2020-03-19
Payer: MEDICARE

## 2020-03-24 ENCOUNTER — APPOINTMENT (OUTPATIENT)
Dept: PHYSICAL THERAPY | Age: 80
End: 2020-03-24
Payer: MEDICARE

## 2020-03-26 ENCOUNTER — APPOINTMENT (OUTPATIENT)
Dept: PHYSICAL THERAPY | Age: 80
End: 2020-03-26
Payer: MEDICARE

## 2020-04-29 NOTE — PROGRESS NOTES
MRI of the cervical spine report reviewed and shows several abnormalities including a nerve and cord impingement that may be responsible for his symptoms. Recommend Neurosurgery referral/consult.  Please inform the patient'

## 2020-04-29 NOTE — PROGRESS NOTES
Spoke with patient advised per Brett العراقي MRI of the cervical spine report reviewed and shows several abnormalities including a nerve and cord impingement that may be responsible for his symptoms. Recommend Neurosurgery referral/consult. Pt expressed understanding and will await there call.

## 2021-01-01 ENCOUNTER — HOSPITAL ENCOUNTER (INPATIENT)
Age: 81
LOS: 7 days | Discharge: HOSPICE | DRG: 871 | End: 2021-03-21
Attending: EMERGENCY MEDICINE | Admitting: FAMILY MEDICINE
Payer: MEDICARE

## 2021-01-01 ENCOUNTER — HOME CARE VISIT (OUTPATIENT)
Dept: SCHEDULING | Facility: HOME HEALTH | Age: 81
End: 2021-01-01
Payer: MEDICARE

## 2021-01-01 ENCOUNTER — APPOINTMENT (OUTPATIENT)
Dept: GENERAL RADIOLOGY | Age: 81
DRG: 871 | End: 2021-01-01
Attending: FAMILY MEDICINE
Payer: MEDICARE

## 2021-01-01 ENCOUNTER — HOSPICE ADMISSION (OUTPATIENT)
Dept: HOSPICE | Facility: HOSPICE | Age: 81
End: 2021-01-01
Payer: MEDICARE

## 2021-01-01 ENCOUNTER — HOME CARE VISIT (OUTPATIENT)
Dept: HOSPICE | Facility: HOSPICE | Age: 81
End: 2021-01-01
Payer: MEDICARE

## 2021-01-01 ENCOUNTER — APPOINTMENT (OUTPATIENT)
Dept: MRI IMAGING | Age: 81
DRG: 871 | End: 2021-01-01
Attending: FAMILY MEDICINE
Payer: MEDICARE

## 2021-01-01 ENCOUNTER — APPOINTMENT (OUTPATIENT)
Dept: CT IMAGING | Age: 81
DRG: 871 | End: 2021-01-01
Attending: FAMILY MEDICINE
Payer: MEDICARE

## 2021-01-01 ENCOUNTER — APPOINTMENT (OUTPATIENT)
Dept: ULTRASOUND IMAGING | Age: 81
DRG: 871 | End: 2021-01-01
Attending: EMERGENCY MEDICINE
Payer: MEDICARE

## 2021-01-01 ENCOUNTER — HOSPITAL ENCOUNTER (EMERGENCY)
Age: 81
Discharge: HOME | DRG: 871 | End: 2021-03-13
Attending: EMERGENCY MEDICINE
Payer: MEDICARE

## 2021-01-01 ENCOUNTER — APPOINTMENT (OUTPATIENT)
Dept: GENERAL RADIOLOGY | Age: 81
DRG: 871 | End: 2021-01-01
Attending: EMERGENCY MEDICINE
Payer: MEDICARE

## 2021-01-01 VITALS
DIASTOLIC BLOOD PRESSURE: 62 MMHG | SYSTOLIC BLOOD PRESSURE: 90 MMHG | RESPIRATION RATE: 18 BRPM | HEART RATE: 100 BPM | DIASTOLIC BLOOD PRESSURE: 60 MMHG | HEART RATE: 110 BPM | RESPIRATION RATE: 20 BRPM | TEMPERATURE: 97.8 F | TEMPERATURE: 99 F | SYSTOLIC BLOOD PRESSURE: 90 MMHG

## 2021-01-01 VITALS
WEIGHT: 174 LBS | BODY MASS INDEX: 25.77 KG/M2 | SYSTOLIC BLOOD PRESSURE: 118 MMHG | TEMPERATURE: 98.6 F | HEART RATE: 76 BPM | HEIGHT: 69 IN | DIASTOLIC BLOOD PRESSURE: 76 MMHG | RESPIRATION RATE: 17 BRPM | OXYGEN SATURATION: 94 %

## 2021-01-01 VITALS
OXYGEN SATURATION: 96 % | BODY MASS INDEX: 26.66 KG/M2 | DIASTOLIC BLOOD PRESSURE: 79 MMHG | HEIGHT: 69 IN | RESPIRATION RATE: 16 BRPM | HEART RATE: 78 BPM | SYSTOLIC BLOOD PRESSURE: 148 MMHG | TEMPERATURE: 99.1 F | WEIGHT: 180 LBS

## 2021-01-01 VITALS
HEIGHT: 69 IN | TEMPERATURE: 98.2 F | SYSTOLIC BLOOD PRESSURE: 119 MMHG | DIASTOLIC BLOOD PRESSURE: 67 MMHG | BODY MASS INDEX: 25.89 KG/M2 | RESPIRATION RATE: 18 BRPM | OXYGEN SATURATION: 98 % | WEIGHT: 174.82 LBS | HEART RATE: 82 BPM

## 2021-01-01 VITALS
HEART RATE: 77 BPM | RESPIRATION RATE: 18 BRPM | DIASTOLIC BLOOD PRESSURE: 82 MMHG | TEMPERATURE: 99.9 F | SYSTOLIC BLOOD PRESSURE: 130 MMHG

## 2021-01-01 VITALS
HEART RATE: 94 BPM | SYSTOLIC BLOOD PRESSURE: 81 MMHG | HEART RATE: 84 BPM | RESPIRATION RATE: 16 BRPM | DIASTOLIC BLOOD PRESSURE: 46 MMHG | DIASTOLIC BLOOD PRESSURE: 42 MMHG | RESPIRATION RATE: 15 BRPM | TEMPERATURE: 100.5 F | SYSTOLIC BLOOD PRESSURE: 82 MMHG | TEMPERATURE: 102.5 F

## 2021-01-01 DIAGNOSIS — R78.81 BACTEREMIA: ICD-10-CM

## 2021-01-01 DIAGNOSIS — B02.9 HERPES ZOSTER WITHOUT COMPLICATION: ICD-10-CM

## 2021-01-01 DIAGNOSIS — M25.512 ACUTE PAIN OF LEFT SHOULDER: Primary | ICD-10-CM

## 2021-01-01 DIAGNOSIS — I71.40 ABDOMINAL AORTIC ANEURYSM (AAA) WITHOUT RUPTURE: ICD-10-CM

## 2021-01-01 DIAGNOSIS — R50.9 FEVER, UNSPECIFIED FEVER CAUSE: Primary | ICD-10-CM

## 2021-01-01 DIAGNOSIS — Z51.5 HOSPICE CARE: ICD-10-CM

## 2021-01-01 DIAGNOSIS — M48.062 LUMBAR STENOSIS WITH NEUROGENIC CLAUDICATION: ICD-10-CM

## 2021-01-01 DIAGNOSIS — Z51.5 ENCOUNTER FOR PALLIATIVE CARE: ICD-10-CM

## 2021-01-01 DIAGNOSIS — R53.83 FATIGUE, UNSPECIFIED TYPE: ICD-10-CM

## 2021-01-01 DIAGNOSIS — R54 FRAILTY: ICD-10-CM

## 2021-01-01 DIAGNOSIS — R41.82 ALTERED MENTAL STATUS, UNSPECIFIED ALTERED MENTAL STATUS TYPE: ICD-10-CM

## 2021-01-01 DIAGNOSIS — D72.829 LEUKOCYTOSIS, UNSPECIFIED TYPE: ICD-10-CM

## 2021-01-01 DIAGNOSIS — J18.9 PNEUMONIA OF LEFT LOWER LOBE DUE TO INFECTIOUS ORGANISM: ICD-10-CM

## 2021-01-01 LAB
ACC. NO. FROM MICRO ORDER, ACCP: ABNORMAL
ACC. NO. FROM MICRO ORDER, ACCP: ABNORMAL
ALBUMIN SERPL-MCNC: 1.8 G/DL (ref 3.2–4.6)
ALBUMIN SERPL-MCNC: 2.2 G/DL (ref 3.2–4.6)
ALBUMIN/GLOB SERPL: 0.4 {RATIO} (ref 1.2–3.5)
ALBUMIN/GLOB SERPL: 0.4 {RATIO} (ref 1.2–3.5)
ALP SERPL-CCNC: 251 U/L (ref 50–136)
ALP SERPL-CCNC: 253 U/L (ref 50–136)
ALT SERPL-CCNC: 36 U/L (ref 12–65)
ALT SERPL-CCNC: 39 U/L (ref 12–65)
AMMONIA PLAS-SCNC: <10 UMOL/L (ref 11–32)
ANION GAP SERPL CALC-SCNC: 5 MMOL/L (ref 7–16)
ANION GAP SERPL CALC-SCNC: 6 MMOL/L (ref 7–16)
ANION GAP SERPL CALC-SCNC: 7 MMOL/L (ref 7–16)
ANION GAP SERPL CALC-SCNC: 7 MMOL/L (ref 7–16)
ANION GAP SERPL CALC-SCNC: 8 MMOL/L (ref 7–16)
APPEARANCE UR: CLEAR
AST SERPL-CCNC: 40 U/L (ref 15–37)
AST SERPL-CCNC: 43 U/L (ref 15–37)
BACTERIA SPEC CULT: ABNORMAL
BACTERIA SPEC CULT: NORMAL
BACTERIA URNS QL MICRO: 0 /HPF
BASOPHILS # BLD: 0 K/UL (ref 0–0.2)
BASOPHILS NFR BLD: 0 % (ref 0–2)
BILIRUB SERPL-MCNC: 0.7 MG/DL (ref 0.2–1.1)
BILIRUB SERPL-MCNC: 0.7 MG/DL (ref 0.2–1.1)
BILIRUB UR QL: ABNORMAL
BUN SERPL-MCNC: 26 MG/DL (ref 8–23)
BUN SERPL-MCNC: 28 MG/DL (ref 8–23)
BUN SERPL-MCNC: 28 MG/DL (ref 8–23)
BUN SERPL-MCNC: 31 MG/DL (ref 8–23)
BUN SERPL-MCNC: 40 MG/DL (ref 8–23)
BUN SERPL-MCNC: 41 MG/DL (ref 8–23)
BUN SERPL-MCNC: 49 MG/DL (ref 8–23)
BUN SERPL-MCNC: 50 MG/DL (ref 8–23)
C DIFF GDH STL QL: NORMAL
C DIFF TOX A+B STL QL IA: NORMAL
CALCIUM SERPL-MCNC: 7.6 MG/DL (ref 8.3–10.4)
CALCIUM SERPL-MCNC: 7.8 MG/DL (ref 8.3–10.4)
CALCIUM SERPL-MCNC: 7.9 MG/DL (ref 8.3–10.4)
CALCIUM SERPL-MCNC: 8.2 MG/DL (ref 8.3–10.4)
CALCIUM SERPL-MCNC: 8.3 MG/DL (ref 8.3–10.4)
CALCIUM SERPL-MCNC: 8.8 MG/DL (ref 8.3–10.4)
CASTS URNS QL MICRO: ABNORMAL /LPF
CHLORIDE SERPL-SCNC: 103 MMOL/L (ref 98–107)
CHLORIDE SERPL-SCNC: 107 MMOL/L (ref 98–107)
CHLORIDE SERPL-SCNC: 111 MMOL/L (ref 98–107)
CHLORIDE SERPL-SCNC: 112 MMOL/L (ref 98–107)
CHLORIDE SERPL-SCNC: 113 MMOL/L (ref 98–107)
CHLORIDE SERPL-SCNC: 116 MMOL/L (ref 98–107)
CHLORIDE SERPL-SCNC: 116 MMOL/L (ref 98–107)
CHLORIDE SERPL-SCNC: 118 MMOL/L (ref 98–107)
CLINICAL CONSIDERATION: NORMAL
CO2 SERPL-SCNC: 22 MMOL/L (ref 21–32)
CO2 SERPL-SCNC: 22 MMOL/L (ref 21–32)
CO2 SERPL-SCNC: 23 MMOL/L (ref 21–32)
CO2 SERPL-SCNC: 23 MMOL/L (ref 21–32)
CO2 SERPL-SCNC: 25 MMOL/L (ref 21–32)
CO2 SERPL-SCNC: 25 MMOL/L (ref 21–32)
CO2 SERPL-SCNC: 26 MMOL/L (ref 21–32)
CO2 SERPL-SCNC: 28 MMOL/L (ref 21–32)
COLOR UR: ABNORMAL
COVID-19 RAPID TEST, COVR: NOT DETECTED
CREAT SERPL-MCNC: 1.12 MG/DL (ref 0.8–1.5)
CREAT SERPL-MCNC: 1.22 MG/DL (ref 0.8–1.5)
CREAT SERPL-MCNC: 1.25 MG/DL (ref 0.8–1.5)
CREAT SERPL-MCNC: 1.37 MG/DL (ref 0.8–1.5)
CREAT SERPL-MCNC: 1.42 MG/DL (ref 0.8–1.5)
CREAT SERPL-MCNC: 1.53 MG/DL (ref 0.8–1.5)
CREAT SERPL-MCNC: 1.61 MG/DL (ref 0.8–1.5)
CREAT SERPL-MCNC: 1.63 MG/DL (ref 0.8–1.5)
CRP SERPL-MCNC: 25.8 MG/DL (ref 0–0.9)
DIFFERENTIAL METHOD BLD: ABNORMAL
EOSINOPHIL # BLD: 0 K/UL (ref 0–0.8)
EOSINOPHIL # BLD: 0.1 K/UL (ref 0–0.8)
EOSINOPHIL # BLD: 0.1 K/UL (ref 0–0.8)
EOSINOPHIL # BLD: 0.2 K/UL (ref 0–0.8)
EOSINOPHIL # BLD: 0.3 K/UL (ref 0–0.8)
EOSINOPHIL NFR BLD: 0 % (ref 0.5–7.8)
EOSINOPHIL NFR BLD: 1 % (ref 0.5–7.8)
EOSINOPHIL NFR BLD: 2 % (ref 0.5–7.8)
EOSINOPHIL NFR BLD: 3 % (ref 0.5–7.8)
EPI CELLS #/AREA URNS HPF: ABNORMAL /HPF
ERYTHROCYTE [DISTWIDTH] IN BLOOD BY AUTOMATED COUNT: 13.4 % (ref 11.9–14.6)
ERYTHROCYTE [DISTWIDTH] IN BLOOD BY AUTOMATED COUNT: 13.5 % (ref 11.9–14.6)
ERYTHROCYTE [DISTWIDTH] IN BLOOD BY AUTOMATED COUNT: 13.9 % (ref 11.9–14.6)
ERYTHROCYTE [DISTWIDTH] IN BLOOD BY AUTOMATED COUNT: 14 % (ref 11.9–14.6)
ERYTHROCYTE [DISTWIDTH] IN BLOOD BY AUTOMATED COUNT: 14.3 % (ref 11.9–14.6)
ERYTHROCYTE [DISTWIDTH] IN BLOOD BY AUTOMATED COUNT: 14.3 % (ref 11.9–14.6)
ERYTHROCYTE [DISTWIDTH] IN BLOOD BY AUTOMATED COUNT: 14.5 % (ref 11.9–14.6)
ERYTHROCYTE [SEDIMENTATION RATE] IN BLOOD: >120 MM/HR (ref 0–20)
FOLATE SERPL-MCNC: 7.5 NG/ML (ref 3.1–17.5)
GLOBULIN SER CALC-MCNC: 4.9 G/DL (ref 2.3–3.5)
GLOBULIN SER CALC-MCNC: 5.1 G/DL (ref 2.3–3.5)
GLUCOSE SERPL-MCNC: 102 MG/DL (ref 65–100)
GLUCOSE SERPL-MCNC: 108 MG/DL (ref 65–100)
GLUCOSE SERPL-MCNC: 114 MG/DL (ref 65–100)
GLUCOSE SERPL-MCNC: 122 MG/DL (ref 65–100)
GLUCOSE SERPL-MCNC: 130 MG/DL (ref 65–100)
GLUCOSE SERPL-MCNC: 132 MG/DL (ref 65–100)
GLUCOSE SERPL-MCNC: 134 MG/DL (ref 65–100)
GLUCOSE SERPL-MCNC: 174 MG/DL (ref 65–100)
GLUCOSE UR STRIP.AUTO-MCNC: NEGATIVE MG/DL
GRAM STN SPEC: ABNORMAL
HCT VFR BLD AUTO: 29.6 % (ref 41.1–50.3)
HCT VFR BLD AUTO: 29.6 % (ref 41.1–50.3)
HCT VFR BLD AUTO: 29.8 % (ref 41.1–50.3)
HCT VFR BLD AUTO: 30.7 % (ref 41.1–50.3)
HCT VFR BLD AUTO: 31.5 % (ref 41.1–50.3)
HCT VFR BLD AUTO: 32.6 % (ref 41.1–50.3)
HCT VFR BLD AUTO: 35.6 % (ref 41.1–50.3)
HGB BLD-MCNC: 10.2 G/DL (ref 13.6–17.2)
HGB BLD-MCNC: 11.1 G/DL (ref 13.6–17.2)
HGB BLD-MCNC: 9.4 G/DL (ref 13.6–17.2)
HGB BLD-MCNC: 9.5 G/DL (ref 13.6–17.2)
HGB BLD-MCNC: 9.5 G/DL (ref 13.6–17.2)
HGB BLD-MCNC: 9.6 G/DL (ref 13.6–17.2)
HGB BLD-MCNC: 9.9 G/DL (ref 13.6–17.2)
HGB UR QL STRIP: NEGATIVE
HIV 1+2 AB+HIV1 P24 AG SERPL QL IA: NONREACTIVE
HIV12 RESULT COMMENT, HHIVC: ABNORMAL
IMM GRANULOCYTES # BLD AUTO: 0.1 K/UL (ref 0–0.5)
IMM GRANULOCYTES # BLD AUTO: 0.7 K/UL (ref 0–0.5)
IMM GRANULOCYTES NFR BLD AUTO: 1 % (ref 0–5)
IMM GRANULOCYTES NFR BLD AUTO: 4 % (ref 0–5)
INTERPRETATION: ABNORMAL
INTERPRETATION: ABNORMAL
INTERPRETATION: NORMAL
KETONES UR QL STRIP.AUTO: NEGATIVE MG/DL
LACTATE SERPL-SCNC: 1.8 MMOL/L (ref 0.4–2)
LEUKOCYTE ESTERASE UR QL STRIP.AUTO: NEGATIVE
LYMPHOCYTES # BLD: 0.4 K/UL (ref 0.5–4.6)
LYMPHOCYTES # BLD: 0.9 K/UL (ref 0.5–4.6)
LYMPHOCYTES # BLD: 1 K/UL (ref 0.5–4.6)
LYMPHOCYTES # BLD: 1.2 K/UL (ref 0.5–4.6)
LYMPHOCYTES # BLD: 1.3 K/UL (ref 0.5–4.6)
LYMPHOCYTES # BLD: 1.6 K/UL (ref 0.5–4.6)
LYMPHOCYTES # BLD: 1.6 K/UL (ref 0.5–4.6)
LYMPHOCYTES NFR BLD: 10 % (ref 13–44)
LYMPHOCYTES NFR BLD: 13 % (ref 13–44)
LYMPHOCYTES NFR BLD: 19 % (ref 13–44)
LYMPHOCYTES NFR BLD: 19 % (ref 13–44)
LYMPHOCYTES NFR BLD: 3 % (ref 13–44)
LYMPHOCYTES NFR BLD: 6 % (ref 13–44)
LYMPHOCYTES NFR BLD: 7 % (ref 13–44)
MCH RBC QN AUTO: 30.1 PG (ref 26.1–32.9)
MCH RBC QN AUTO: 30.4 PG (ref 26.1–32.9)
MCH RBC QN AUTO: 30.5 PG (ref 26.1–32.9)
MCH RBC QN AUTO: 30.6 PG (ref 26.1–32.9)
MCH RBC QN AUTO: 30.9 PG (ref 26.1–32.9)
MCHC RBC AUTO-ENTMCNC: 31.2 G/DL (ref 31.4–35)
MCHC RBC AUTO-ENTMCNC: 31.3 G/DL (ref 31.4–35)
MCHC RBC AUTO-ENTMCNC: 31.3 G/DL (ref 31.4–35)
MCHC RBC AUTO-ENTMCNC: 31.4 G/DL (ref 31.4–35)
MCHC RBC AUTO-ENTMCNC: 31.8 G/DL (ref 31.4–35)
MCHC RBC AUTO-ENTMCNC: 31.9 G/DL (ref 31.4–35)
MCHC RBC AUTO-ENTMCNC: 32.1 G/DL (ref 31.4–35)
MCV RBC AUTO: 95.7 FL (ref 79.6–97.8)
MCV RBC AUTO: 95.8 FL (ref 79.6–97.8)
MCV RBC AUTO: 96.1 FL (ref 79.6–97.8)
MCV RBC AUTO: 96.4 FL (ref 79.6–97.8)
MCV RBC AUTO: 97.2 FL (ref 79.6–97.8)
MCV RBC AUTO: 97.5 FL (ref 79.6–97.8)
MCV RBC AUTO: 97.6 FL (ref 79.6–97.8)
MECA (METHICILLIN-RESISTANCE GENES), MRGP: NOT DETECTED
MECA (METHICILLIN-RESISTANCE GENES), MRGP: NOT DETECTED
MM INDURATION POC: 0 MM (ref 0–5)
MM INDURATION POC: 0 MM (ref 0–5)
MONOCYTES # BLD: 0.8 K/UL (ref 0.1–1.3)
MONOCYTES # BLD: 0.8 K/UL (ref 0.1–1.3)
MONOCYTES # BLD: 0.9 K/UL (ref 0.1–1.3)
MONOCYTES # BLD: 1.1 K/UL (ref 0.1–1.3)
MONOCYTES NFR BLD: 11 % (ref 4–12)
MONOCYTES NFR BLD: 11 % (ref 4–12)
MONOCYTES NFR BLD: 5 % (ref 4–12)
MONOCYTES NFR BLD: 6 % (ref 4–12)
MONOCYTES NFR BLD: 7 % (ref 4–12)
MONOCYTES NFR BLD: 8 % (ref 4–12)
MONOCYTES NFR BLD: 9 % (ref 4–12)
NEUTS SEG # BLD: 10.8 K/UL (ref 1.7–8.2)
NEUTS SEG # BLD: 11.9 K/UL (ref 1.7–8.2)
NEUTS SEG # BLD: 14.7 K/UL (ref 1.7–8.2)
NEUTS SEG # BLD: 5.6 K/UL (ref 1.7–8.2)
NEUTS SEG # BLD: 5.8 K/UL (ref 1.7–8.2)
NEUTS SEG # BLD: 7.5 K/UL (ref 1.7–8.2)
NEUTS SEG # BLD: 9.7 K/UL (ref 1.7–8.2)
NEUTS SEG NFR BLD: 66 % (ref 43–78)
NEUTS SEG NFR BLD: 67 % (ref 43–78)
NEUTS SEG NFR BLD: 77 % (ref 43–78)
NEUTS SEG NFR BLD: 81 % (ref 43–78)
NEUTS SEG NFR BLD: 84 % (ref 43–78)
NEUTS SEG NFR BLD: 84 % (ref 43–78)
NEUTS SEG NFR BLD: 90 % (ref 43–78)
NITRITE UR QL STRIP.AUTO: NEGATIVE
NRBC # BLD: 0 K/UL (ref 0–0.2)
PCR REFLEX: NORMAL
PH UR STRIP: 5.5 [PH] (ref 5–9)
PLATELET # BLD AUTO: 107 K/UL (ref 150–450)
PLATELET # BLD AUTO: 107 K/UL (ref 150–450)
PLATELET # BLD AUTO: 141 K/UL (ref 150–450)
PLATELET # BLD AUTO: 152 K/UL (ref 150–450)
PLATELET # BLD AUTO: 197 K/UL (ref 150–450)
PLATELET # BLD AUTO: 92 K/UL (ref 150–450)
PLATELET # BLD AUTO: 92 K/UL (ref 150–450)
PMV BLD AUTO: 10.1 FL (ref 9.4–12.3)
PMV BLD AUTO: 10.2 FL (ref 9.4–12.3)
PMV BLD AUTO: 10.5 FL (ref 9.4–12.3)
PMV BLD AUTO: 11.1 FL (ref 9.4–12.3)
PMV BLD AUTO: 8.9 FL (ref 9.4–12.3)
PMV BLD AUTO: 9 FL (ref 9.4–12.3)
PMV BLD AUTO: 9.7 FL (ref 9.4–12.3)
POTASSIUM SERPL-SCNC: 3.1 MMOL/L (ref 3.5–5.1)
POTASSIUM SERPL-SCNC: 3.3 MMOL/L (ref 3.5–5.1)
POTASSIUM SERPL-SCNC: 3.7 MMOL/L (ref 3.5–5.1)
POTASSIUM SERPL-SCNC: 3.8 MMOL/L (ref 3.5–5.1)
POTASSIUM SERPL-SCNC: 3.9 MMOL/L (ref 3.5–5.1)
POTASSIUM SERPL-SCNC: 3.9 MMOL/L (ref 3.5–5.1)
POTASSIUM SERPL-SCNC: 4.1 MMOL/L (ref 3.5–5.1)
POTASSIUM SERPL-SCNC: 4.3 MMOL/L (ref 3.5–5.1)
PPD POC: NEGATIVE NEGATIVE
PPD POC: NEGATIVE NEGATIVE
PROT SERPL-MCNC: 6.7 G/DL (ref 6.3–8.2)
PROT SERPL-MCNC: 7.3 G/DL (ref 6.3–8.2)
PROT UR STRIP-MCNC: 30 MG/DL
RBC # BLD AUTO: 3.07 M/UL (ref 4.23–5.6)
RBC # BLD AUTO: 3.09 M/UL (ref 4.23–5.6)
RBC # BLD AUTO: 3.1 M/UL (ref 4.23–5.6)
RBC # BLD AUTO: 3.16 M/UL (ref 4.23–5.6)
RBC # BLD AUTO: 3.29 M/UL (ref 4.23–5.6)
RBC # BLD AUTO: 3.34 M/UL (ref 4.23–5.6)
RBC # BLD AUTO: 3.65 M/UL (ref 4.23–5.6)
RBC #/AREA URNS HPF: ABNORMAL /HPF
RPR SER QL: NONREACTIVE
SARS COV-2, XPGCVT: NEGATIVE
SARS-COV-2, COV2: NORMAL
SERVICE CMNT-IMP: ABNORMAL
SERVICE CMNT-IMP: NORMAL
SODIUM SERPL-SCNC: 136 MMOL/L (ref 138–145)
SODIUM SERPL-SCNC: 137 MMOL/L (ref 136–145)
SODIUM SERPL-SCNC: 141 MMOL/L (ref 136–145)
SODIUM SERPL-SCNC: 142 MMOL/L (ref 136–145)
SODIUM SERPL-SCNC: 144 MMOL/L (ref 136–145)
SODIUM SERPL-SCNC: 145 MMOL/L (ref 136–145)
SODIUM SERPL-SCNC: 146 MMOL/L (ref 138–145)
SODIUM SERPL-SCNC: 147 MMOL/L (ref 136–145)
SOURCE, COVRS: NORMAL
SP GR UR REFRACTOMETRY: 1.03 (ref 1–1.02)
STAPHYLOCOCCUS AUREUS: DETECTED
STAPHYLOCOCCUS AUREUS: DETECTED
STAPHYLOCOCCUS, STAPP: DETECTED
STAPHYLOCOCCUS, STAPP: DETECTED
TSH SERPL DL<=0.005 MIU/L-ACNC: 4.84 UIU/ML (ref 0.36–3.74)
UROBILINOGEN UR QL STRIP.AUTO: 1 EU/DL (ref 0.2–1)
VIT B12 SERPL-MCNC: 276 PG/ML (ref 193–986)
WBC # BLD AUTO: 11.9 K/UL (ref 4.3–11.1)
WBC # BLD AUTO: 12.9 K/UL (ref 4.3–11.1)
WBC # BLD AUTO: 13.2 K/UL (ref 4.3–11.1)
WBC # BLD AUTO: 17.5 K/UL (ref 4.3–11.1)
WBC # BLD AUTO: 8.5 K/UL (ref 4.3–11.1)
WBC # BLD AUTO: 8.6 K/UL (ref 4.3–11.1)
WBC # BLD AUTO: 9.8 K/UL (ref 4.3–11.1)
WBC URNS QL MICRO: ABNORMAL /HPF

## 2021-01-01 PROCEDURE — 74011250636 HC RX REV CODE- 250/636: Performed by: EMERGENCY MEDICINE

## 2021-01-01 PROCEDURE — 0651 HSPC ROUTINE HOME CARE

## 2021-01-01 PROCEDURE — 97535 SELF CARE MNGMENT TRAINING: CPT

## 2021-01-01 PROCEDURE — G0299 HHS/HOSPICE OF RN EA 15 MIN: HCPCS

## 2021-01-01 PROCEDURE — 87205 SMEAR GRAM STAIN: CPT

## 2021-01-01 PROCEDURE — 87635 SARS-COV-2 COVID-19 AMP PRB: CPT

## 2021-01-01 PROCEDURE — 36415 COLL VENOUS BLD VENIPUNCTURE: CPT

## 2021-01-01 PROCEDURE — 65660000000 HC RM CCU STEPDOWN

## 2021-01-01 PROCEDURE — 73030 X-RAY EXAM OF SHOULDER: CPT

## 2021-01-01 PROCEDURE — 97530 THERAPEUTIC ACTIVITIES: CPT

## 2021-01-01 PROCEDURE — 85652 RBC SED RATE AUTOMATED: CPT

## 2021-01-01 PROCEDURE — 74011000250 HC RX REV CODE- 250: Performed by: NURSE PRACTITIONER

## 2021-01-01 PROCEDURE — 74011250636 HC RX REV CODE- 250/636: Performed by: FAMILY MEDICINE

## 2021-01-01 PROCEDURE — 87077 CULTURE AEROBIC IDENTIFY: CPT

## 2021-01-01 PROCEDURE — 87186 SC STD MICRODIL/AGAR DIL: CPT

## 2021-01-01 PROCEDURE — 96365 THER/PROPH/DIAG IV INF INIT: CPT

## 2021-01-01 PROCEDURE — 82746 ASSAY OF FOLIC ACID SERUM: CPT

## 2021-01-01 PROCEDURE — 74011250637 HC RX REV CODE- 250/637: Performed by: FAMILY MEDICINE

## 2021-01-01 PROCEDURE — 80048 BASIC METABOLIC PNL TOTAL CA: CPT

## 2021-01-01 PROCEDURE — G0155 HHCP-SVS OF CSW,EA 15 MIN: HCPCS

## 2021-01-01 PROCEDURE — 86580 TB INTRADERMAL TEST: CPT | Performed by: FAMILY MEDICINE

## 2021-01-01 PROCEDURE — 76705 ECHO EXAM OF ABDOMEN: CPT

## 2021-01-01 PROCEDURE — 99285 EMERGENCY DEPT VISIT HI MDM: CPT

## 2021-01-01 PROCEDURE — 74011000250 HC RX REV CODE- 250: Performed by: FAMILY MEDICINE

## 2021-01-01 PROCEDURE — 92610 EVALUATE SWALLOWING FUNCTION: CPT

## 2021-01-01 PROCEDURE — 96374 THER/PROPH/DIAG INJ IV PUSH: CPT

## 2021-01-01 PROCEDURE — G0156 HHCP-SVS OF AIDE,EA 15 MIN: HCPCS

## 2021-01-01 PROCEDURE — 74230 X-RAY XM SWLNG FUNCJ C+: CPT

## 2021-01-01 PROCEDURE — 87040 BLOOD CULTURE FOR BACTERIA: CPT

## 2021-01-01 PROCEDURE — 2709999900 HC NON-CHARGEABLE SUPPLY

## 2021-01-01 PROCEDURE — 99232 SBSQ HOSP IP/OBS MODERATE 35: CPT | Performed by: INTERNAL MEDICINE

## 2021-01-01 PROCEDURE — 94760 N-INVAS EAR/PLS OXIMETRY 1: CPT

## 2021-01-01 PROCEDURE — 74011250636 HC RX REV CODE- 250/636: Performed by: NURSE PRACTITIONER

## 2021-01-01 PROCEDURE — 87150 DNA/RNA AMPLIFIED PROBE: CPT

## 2021-01-01 PROCEDURE — 87324 CLOSTRIDIUM AG IA: CPT

## 2021-01-01 PROCEDURE — 85025 COMPLETE CBC W/AUTO DIFF WBC: CPT

## 2021-01-01 PROCEDURE — 81001 URINALYSIS AUTO W/SCOPE: CPT

## 2021-01-01 PROCEDURE — 97162 PT EVAL MOD COMPLEX 30 MIN: CPT

## 2021-01-01 PROCEDURE — 92526 ORAL FUNCTION THERAPY: CPT

## 2021-01-01 PROCEDURE — 97167 OT EVAL HIGH COMPLEX 60 MIN: CPT

## 2021-01-01 PROCEDURE — 3336500001 HSPC ELECTION

## 2021-01-01 PROCEDURE — 86592 SYPHILIS TEST NON-TREP QUAL: CPT

## 2021-01-01 PROCEDURE — 74011000258 HC RX REV CODE- 258: Performed by: FAMILY MEDICINE

## 2021-01-01 PROCEDURE — 83605 ASSAY OF LACTIC ACID: CPT

## 2021-01-01 PROCEDURE — 74011000302 HC RX REV CODE- 302: Performed by: FAMILY MEDICINE

## 2021-01-01 PROCEDURE — 93005 ELECTROCARDIOGRAM TRACING: CPT | Performed by: EMERGENCY MEDICINE

## 2021-01-01 PROCEDURE — 81003 URINALYSIS AUTO W/O SCOPE: CPT

## 2021-01-01 PROCEDURE — 92611 MOTION FLUOROSCOPY/SWALLOW: CPT

## 2021-01-01 PROCEDURE — 97112 NEUROMUSCULAR REEDUCATION: CPT

## 2021-01-01 PROCEDURE — HOSPICE MEDICATION HC HH HOSPICE MEDICATION

## 2021-01-01 PROCEDURE — 96361 HYDRATE IV INFUSION ADD-ON: CPT

## 2021-01-01 PROCEDURE — 82607 VITAMIN B-12: CPT

## 2021-01-01 PROCEDURE — 84443 ASSAY THYROID STIM HORMONE: CPT

## 2021-01-01 PROCEDURE — 87389 HIV-1 AG W/HIV-1&-2 AB AG IA: CPT

## 2021-01-01 PROCEDURE — 80053 COMPREHEN METABOLIC PANEL: CPT

## 2021-01-01 PROCEDURE — C8929 TTE W OR WO FOL WCON,DOPPLER: HCPCS

## 2021-01-01 PROCEDURE — 76937 US GUIDE VASCULAR ACCESS: CPT

## 2021-01-01 PROCEDURE — U0004 COV-19 TEST NON-CDC HGH THRU: HCPCS

## 2021-01-01 PROCEDURE — 74011000258 HC RX REV CODE- 258: Performed by: EMERGENCY MEDICINE

## 2021-01-01 PROCEDURE — 97110 THERAPEUTIC EXERCISES: CPT

## 2021-01-01 PROCEDURE — 86140 C-REACTIVE PROTEIN: CPT

## 2021-01-01 PROCEDURE — 99222 1ST HOSP IP/OBS MODERATE 55: CPT | Performed by: INTERNAL MEDICINE

## 2021-01-01 PROCEDURE — 74011250637 HC RX REV CODE- 250/637: Performed by: EMERGENCY MEDICINE

## 2021-01-01 PROCEDURE — 3331090004 HSPC SERVICE INTENSITY ADD-ON

## 2021-01-01 PROCEDURE — 70450 CT HEAD/BRAIN W/O DYE: CPT

## 2021-01-01 PROCEDURE — 99231 SBSQ HOSP IP/OBS SF/LOW 25: CPT | Performed by: NURSE PRACTITIONER

## 2021-01-01 PROCEDURE — 76450000000

## 2021-01-01 PROCEDURE — 82140 ASSAY OF AMMONIA: CPT

## 2021-01-01 PROCEDURE — 71045 X-RAY EXAM CHEST 1 VIEW: CPT

## 2021-01-01 PROCEDURE — 77010033678 HC OXYGEN DAILY

## 2021-01-01 RX ORDER — ATORVASTATIN CALCIUM 40 MG/1
40 TABLET, FILM COATED ORAL
Status: DISCONTINUED | OUTPATIENT
Start: 2021-01-01 | End: 2021-01-01 | Stop reason: HOSPADM

## 2021-01-01 RX ORDER — VANCOMYCIN 2 GRAM/500 ML IN 0.9 % SODIUM CHLORIDE INTRAVENOUS
2000
Status: COMPLETED | OUTPATIENT
Start: 2021-01-01 | End: 2021-01-01

## 2021-01-01 RX ORDER — MORPHINE SULFATE 20 MG/ML
5 SOLUTION ORAL
Qty: 1 BOTTLE | Refills: 0 | Status: SHIPPED | OUTPATIENT
Start: 2021-01-01 | End: 2021-01-01

## 2021-01-01 RX ORDER — POLYETHYLENE GLYCOL 3350 17 G/17G
17 POWDER, FOR SOLUTION ORAL DAILY PRN
Status: DISCONTINUED | OUTPATIENT
Start: 2021-01-01 | End: 2021-01-01 | Stop reason: HOSPADM

## 2021-01-01 RX ORDER — GUAIFENESIN 600 MG/1
600 TABLET, EXTENDED RELEASE ORAL EVERY 12 HOURS
Status: DISCONTINUED | OUTPATIENT
Start: 2021-01-01 | End: 2021-01-01 | Stop reason: HOSPADM

## 2021-01-01 RX ORDER — TRAMADOL HYDROCHLORIDE 50 MG/1
50 TABLET ORAL
Qty: 11 TAB | Refills: 0 | Status: SHIPPED | OUTPATIENT
Start: 2021-01-01 | End: 2021-01-01

## 2021-01-01 RX ORDER — FAMOTIDINE 20 MG/1
20 TABLET, FILM COATED ORAL DAILY
Status: DISCONTINUED | OUTPATIENT
Start: 2021-01-01 | End: 2021-01-01 | Stop reason: HOSPADM

## 2021-01-01 RX ORDER — ACETAMINOPHEN 650 MG/1
650 SUPPOSITORY RECTAL
Status: COMPLETED | OUTPATIENT
Start: 2021-01-01 | End: 2021-01-01

## 2021-01-01 RX ORDER — LEVOTHYROXINE SODIUM 100 UG/1
100 TABLET ORAL
Status: DISCONTINUED | OUTPATIENT
Start: 2021-01-01 | End: 2021-01-01 | Stop reason: HOSPADM

## 2021-01-01 RX ORDER — ENOXAPARIN SODIUM 100 MG/ML
40 INJECTION SUBCUTANEOUS DAILY
Status: DISCONTINUED | OUTPATIENT
Start: 2021-01-01 | End: 2021-01-01 | Stop reason: HOSPADM

## 2021-01-01 RX ORDER — VANCOMYCIN HYDROCHLORIDE
1250 EVERY 24 HOURS
Status: DISCONTINUED | OUTPATIENT
Start: 2021-01-01 | End: 2021-01-01

## 2021-01-01 RX ORDER — DEXTROSE MONOHYDRATE AND SODIUM CHLORIDE 5; .9 G/100ML; G/100ML
75 INJECTION, SOLUTION INTRAVENOUS CONTINUOUS
Status: DISCONTINUED | OUTPATIENT
Start: 2021-01-01 | End: 2021-01-01

## 2021-01-01 RX ORDER — ALBUTEROL SULFATE 0.83 MG/ML
2.5 SOLUTION RESPIRATORY (INHALATION)
Status: CANCELLED | OUTPATIENT
Start: 2021-01-01

## 2021-01-01 RX ORDER — ASPIRIN 81 MG/1
81 TABLET ORAL
Status: DISCONTINUED | OUTPATIENT
Start: 2021-01-01 | End: 2021-01-01 | Stop reason: HOSPADM

## 2021-01-01 RX ORDER — HALOPERIDOL 5 MG/ML
1 INJECTION INTRAMUSCULAR ONCE
Status: ACTIVE | OUTPATIENT
Start: 2021-01-01 | End: 2021-01-01

## 2021-01-01 RX ORDER — DEXTROSE MONOHYDRATE 50 MG/ML
100 INJECTION, SOLUTION INTRAVENOUS CONTINUOUS
Status: DISCONTINUED | OUTPATIENT
Start: 2021-01-01 | End: 2021-01-01 | Stop reason: HOSPADM

## 2021-01-01 RX ORDER — ACETAMINOPHEN 325 MG/1
650 TABLET ORAL
Status: DISCONTINUED | OUTPATIENT
Start: 2021-01-01 | End: 2021-01-01 | Stop reason: HOSPADM

## 2021-01-01 RX ORDER — ACETAMINOPHEN 650 MG/1
650 SUPPOSITORY RECTAL
Status: DISCONTINUED | OUTPATIENT
Start: 2021-01-01 | End: 2021-01-01 | Stop reason: HOSPADM

## 2021-01-01 RX ORDER — MORPHINE SULFATE 2 MG/ML
0.5 INJECTION, SOLUTION INTRAMUSCULAR; INTRAVENOUS
Status: DISCONTINUED | OUTPATIENT
Start: 2021-01-01 | End: 2021-01-01 | Stop reason: HOSPADM

## 2021-01-01 RX ORDER — ONDANSETRON 2 MG/ML
4 INJECTION INTRAMUSCULAR; INTRAVENOUS
Status: DISCONTINUED | OUTPATIENT
Start: 2021-01-01 | End: 2021-01-01 | Stop reason: HOSPADM

## 2021-01-01 RX ORDER — LOSARTAN POTASSIUM 50 MG/1
100 TABLET ORAL DAILY
Status: DISCONTINUED | OUTPATIENT
Start: 2021-01-01 | End: 2021-01-01

## 2021-01-01 RX ORDER — CEFADROXIL 1000 MG/1
1 TABLET ORAL 2 TIMES DAILY
Qty: 60 TAB | Refills: 0 | Status: SHIPPED | OUTPATIENT
Start: 2021-01-01 | End: 2021-01-01

## 2021-01-01 RX ORDER — NITROGLYCERIN 0.4 MG/1
0.4 TABLET SUBLINGUAL
Status: DISCONTINUED | OUTPATIENT
Start: 2021-01-01 | End: 2021-01-01 | Stop reason: HOSPADM

## 2021-01-01 RX ORDER — SODIUM CHLORIDE 0.9 % (FLUSH) 0.9 %
5-40 SYRINGE (ML) INJECTION EVERY 8 HOURS
Status: DISCONTINUED | OUTPATIENT
Start: 2021-01-01 | End: 2021-01-01 | Stop reason: HOSPADM

## 2021-01-01 RX ORDER — PROMETHAZINE HYDROCHLORIDE 25 MG/1
12.5 TABLET ORAL
Status: DISCONTINUED | OUTPATIENT
Start: 2021-01-01 | End: 2021-01-01 | Stop reason: HOSPADM

## 2021-01-01 RX ORDER — SODIUM CHLORIDE 0.9 % (FLUSH) 0.9 %
5-40 SYRINGE (ML) INJECTION AS NEEDED
Status: DISCONTINUED | OUTPATIENT
Start: 2021-01-01 | End: 2021-01-01 | Stop reason: HOSPADM

## 2021-01-01 RX ADMIN — ACETAMINOPHEN 650 MG: 650 SUPPOSITORY RECTAL at 03:20

## 2021-01-01 RX ADMIN — PIPERACILLIN SODIUM AND TAZOBACTAM SODIUM 3.38 G: 3; .375 INJECTION, POWDER, LYOPHILIZED, FOR SOLUTION INTRAVENOUS at 13:15

## 2021-01-01 RX ADMIN — NAFCILLIN 12 G: 10 INJECTION, POWDER, FOR SOLUTION INTRAVENOUS at 16:30

## 2021-01-01 RX ADMIN — ATORVASTATIN CALCIUM 40 MG: 40 TABLET, FILM COATED ORAL at 22:36

## 2021-01-01 RX ADMIN — Medication 10 ML: at 06:00

## 2021-01-01 RX ADMIN — FAMOTIDINE 20 MG: 20 TABLET, FILM COATED ORAL at 09:47

## 2021-01-01 RX ADMIN — Medication 10 ML: at 21:11

## 2021-01-01 RX ADMIN — ENOXAPARIN SODIUM 40 MG: 40 INJECTION SUBCUTANEOUS at 09:47

## 2021-01-01 RX ADMIN — Medication 10 ML: at 06:37

## 2021-01-01 RX ADMIN — ACETAMINOPHEN 650 MG: 650 SUPPOSITORY RECTAL at 20:36

## 2021-01-01 RX ADMIN — NAFCILLIN 12 G: 10 INJECTION, POWDER, FOR SOLUTION INTRAVENOUS at 17:09

## 2021-01-01 RX ADMIN — ENOXAPARIN SODIUM 40 MG: 40 INJECTION SUBCUTANEOUS at 08:20

## 2021-01-01 RX ADMIN — FAMOTIDINE 20 MG: 20 TABLET, FILM COATED ORAL at 08:20

## 2021-01-01 RX ADMIN — Medication 10 ML: at 05:53

## 2021-01-01 RX ADMIN — ASPIRIN 81 MG: 81 TABLET ORAL at 21:04

## 2021-01-01 RX ADMIN — ASPIRIN 81 MG: 81 TABLET ORAL at 21:07

## 2021-01-01 RX ADMIN — DEXTROSE MONOHYDRATE 100 ML/HR: 5 INJECTION, SOLUTION INTRAVENOUS at 18:00

## 2021-01-01 RX ADMIN — ACETAMINOPHEN 650 MG: 650 SUPPOSITORY RECTAL at 21:56

## 2021-01-01 RX ADMIN — LEVOTHYROXINE SODIUM 100 MCG: 0.1 TABLET ORAL at 06:32

## 2021-01-01 RX ADMIN — DEXTROSE MONOHYDRATE 100 ML/HR: 5 INJECTION, SOLUTION INTRAVENOUS at 16:55

## 2021-01-01 RX ADMIN — GUAIFENESIN 600 MG: 600 TABLET ORAL at 12:09

## 2021-01-01 RX ADMIN — ENOXAPARIN SODIUM 40 MG: 40 INJECTION SUBCUTANEOUS at 08:28

## 2021-01-01 RX ADMIN — BARIUM SULFATE 15 ML: 400 PASTE ORAL at 08:47

## 2021-01-01 RX ADMIN — LEVOTHYROXINE SODIUM 100 MCG: 0.1 TABLET ORAL at 05:45

## 2021-01-01 RX ADMIN — ATORVASTATIN CALCIUM 40 MG: 40 TABLET, FILM COATED ORAL at 21:11

## 2021-01-01 RX ADMIN — DEXTROSE MONOHYDRATE 100 ML/HR: 5 INJECTION, SOLUTION INTRAVENOUS at 09:55

## 2021-01-01 RX ADMIN — ATORVASTATIN CALCIUM 40 MG: 40 TABLET, FILM COATED ORAL at 21:07

## 2021-01-01 RX ADMIN — LEVOTHYROXINE SODIUM 100 MCG: 0.1 TABLET ORAL at 06:00

## 2021-01-01 RX ADMIN — SODIUM CHLORIDE 1000 ML: 900 INJECTION, SOLUTION INTRAVENOUS at 20:37

## 2021-01-01 RX ADMIN — ASPIRIN 81 MG: 81 TABLET ORAL at 22:37

## 2021-01-01 RX ADMIN — SODIUM CHLORIDE 1000 ML: 900 INJECTION, SOLUTION INTRAVENOUS at 21:36

## 2021-01-01 RX ADMIN — NAFCILLIN 12 G: 10 INJECTION, POWDER, FOR SOLUTION INTRAVENOUS at 16:55

## 2021-01-01 RX ADMIN — GUAIFENESIN 600 MG: 600 TABLET ORAL at 09:56

## 2021-01-01 RX ADMIN — Medication 10 ML: at 22:00

## 2021-01-01 RX ADMIN — FAMOTIDINE 20 MG: 20 TABLET, FILM COATED ORAL at 09:56

## 2021-01-01 RX ADMIN — FAMOTIDINE 20 MG: 20 TABLET, FILM COATED ORAL at 12:09

## 2021-01-01 RX ADMIN — VANCOMYCIN HYDROCHLORIDE 2000 MG: 10 INJECTION, POWDER, LYOPHILIZED, FOR SOLUTION INTRAVENOUS at 13:15

## 2021-01-01 RX ADMIN — LEVOTHYROXINE SODIUM 100 MCG: 0.1 TABLET ORAL at 12:09

## 2021-01-01 RX ADMIN — ENOXAPARIN SODIUM 40 MG: 40 INJECTION SUBCUTANEOUS at 09:56

## 2021-01-01 RX ADMIN — ACETAMINOPHEN 650 MG: 650 SUPPOSITORY RECTAL at 13:15

## 2021-01-01 RX ADMIN — DEXTROSE MONOHYDRATE AND SODIUM CHLORIDE 75 ML/HR: 5; .9 INJECTION, SOLUTION INTRAVENOUS at 05:39

## 2021-01-01 RX ADMIN — GUAIFENESIN 600 MG: 600 TABLET ORAL at 09:47

## 2021-01-01 RX ADMIN — LEVOTHYROXINE SODIUM 100 MCG: 0.1 TABLET ORAL at 07:03

## 2021-01-01 RX ADMIN — Medication 10 ML: at 15:11

## 2021-01-01 RX ADMIN — DEXTROSE MONOHYDRATE 100 ML/HR: 5 INJECTION, SOLUTION INTRAVENOUS at 20:09

## 2021-01-01 RX ADMIN — ENOXAPARIN SODIUM 40 MG: 40 INJECTION SUBCUTANEOUS at 08:52

## 2021-01-01 RX ADMIN — NAFCILLIN 12 G: 10 INJECTION, POWDER, FOR SOLUTION INTRAVENOUS at 17:29

## 2021-01-01 RX ADMIN — GUAIFENESIN 600 MG: 600 TABLET ORAL at 21:04

## 2021-01-01 RX ADMIN — GUAIFENESIN 600 MG: 600 TABLET ORAL at 22:36

## 2021-01-01 RX ADMIN — BARIUM SULFATE 45 ML: 980 POWDER, FOR SUSPENSION ORAL at 08:47

## 2021-01-01 RX ADMIN — TUBERCULIN PURIFIED PROTEIN DERIVATIVE 5 UNITS: 5 INJECTION, SOLUTION INTRADERMAL at 04:01

## 2021-01-01 RX ADMIN — SODIUM CHLORIDE 448 ML: 900 INJECTION, SOLUTION INTRAVENOUS at 21:25

## 2021-01-01 RX ADMIN — NAFCILLIN 12 G: 10 INJECTION, POWDER, FOR SOLUTION INTRAVENOUS at 16:54

## 2021-01-01 RX ADMIN — DEXTROSE MONOHYDRATE 100 ML/HR: 5 INJECTION, SOLUTION INTRAVENOUS at 09:52

## 2021-01-01 RX ADMIN — AZITHROMYCIN MONOHYDRATE 500 MG: 500 INJECTION, POWDER, LYOPHILIZED, FOR SOLUTION INTRAVENOUS at 23:00

## 2021-01-01 RX ADMIN — ENOXAPARIN SODIUM 40 MG: 40 INJECTION SUBCUTANEOUS at 08:44

## 2021-01-01 RX ADMIN — Medication 10 ML: at 22:54

## 2021-01-01 RX ADMIN — DEXTROSE MONOHYDRATE 100 ML/HR: 5 INJECTION, SOLUTION INTRAVENOUS at 13:45

## 2021-01-01 RX ADMIN — GUAIFENESIN 600 MG: 600 TABLET ORAL at 08:28

## 2021-01-01 RX ADMIN — NAFCILLIN 12 G: 10 INJECTION, POWDER, FOR SOLUTION INTRAVENOUS at 18:00

## 2021-01-01 RX ADMIN — Medication 10 ML: at 05:56

## 2021-01-01 RX ADMIN — GUAIFENESIN 600 MG: 600 TABLET ORAL at 21:07

## 2021-01-01 RX ADMIN — DEXTROSE MONOHYDRATE 100 ML/HR: 5 INJECTION, SOLUTION INTRAVENOUS at 06:12

## 2021-01-01 RX ADMIN — CEFTRIAXONE SODIUM 1 G: 1 INJECTION, POWDER, FOR SOLUTION INTRAMUSCULAR; INTRAVENOUS at 20:38

## 2021-01-01 RX ADMIN — PERFLUTREN 1 ML: 6.52 INJECTION, SUSPENSION INTRAVENOUS at 15:00

## 2021-01-01 RX ADMIN — Medication 10 ML: at 18:01

## 2021-01-01 RX ADMIN — Medication 10 ML: at 22:36

## 2021-01-01 RX ADMIN — Medication 10 ML: at 23:17

## 2021-01-01 RX ADMIN — GUAIFENESIN 600 MG: 600 TABLET ORAL at 21:11

## 2021-01-01 RX ADMIN — NAFCILLIN 12 G: 10 INJECTION, POWDER, FOR SOLUTION INTRAVENOUS at 18:02

## 2021-01-01 RX ADMIN — Medication 10 ML: at 06:03

## 2021-01-01 RX ADMIN — DEXTROSE MONOHYDRATE AND SODIUM CHLORIDE 75 ML/HR: 5; .9 INJECTION, SOLUTION INTRAVENOUS at 13:16

## 2021-01-01 RX ADMIN — Medication 10 ML: at 07:00

## 2021-01-01 RX ADMIN — GUAIFENESIN 600 MG: 600 TABLET ORAL at 08:20

## 2021-01-01 RX ADMIN — FAMOTIDINE 20 MG: 20 TABLET, FILM COATED ORAL at 08:52

## 2021-01-01 RX ADMIN — Medication 10 ML: at 13:16

## 2021-01-01 RX ADMIN — ASPIRIN 81 MG: 81 TABLET ORAL at 21:11

## 2021-01-01 RX ADMIN — DEXTROSE MONOHYDRATE AND SODIUM CHLORIDE 75 ML/HR: 5; .9 INJECTION, SOLUTION INTRAVENOUS at 03:20

## 2021-01-01 RX ADMIN — LEVOTHYROXINE SODIUM 100 MCG: 0.1 TABLET ORAL at 05:56

## 2021-01-01 RX ADMIN — GUAIFENESIN 600 MG: 600 TABLET ORAL at 08:52

## 2021-01-01 RX ADMIN — FAMOTIDINE 20 MG: 20 TABLET, FILM COATED ORAL at 08:28

## 2021-01-01 RX ADMIN — ATORVASTATIN CALCIUM 40 MG: 40 TABLET, FILM COATED ORAL at 21:04

## 2021-03-13 NOTE — ED PROVIDER NOTES
27-year-old gentleman presents with concerns about pain in his left arm that he says is preventing him from adequately using his arm. He also says that he is fatigued. He denies any specific fevers or chills. Has had no cough or shortness of breath. He was diagnosed a couple of days ago with shingles in his left arm and was given a prescription for acyclovir. He says he has been taking that. He had both Covid vaccines in February. Triage note says he was sent home from [facility] with Valtrex but according to their paperwork he was given acyclovir. No other associated symptoms. Elements of this note were created using speech recognition software. As such, errors of speech recognition may be present. Past Medical History:  
Diagnosis Date  Aneurysm ([address]  ASCAD - of the native vessel 6/13/2016  CAD (coronary artery disease) 3 stents  had M.I. 2001  Cancer ([address] melanoma  Chronic pain   
 back  Coagulation disorder ([address]   
 pt denies this  Crohn's colitis (Dr Angela Carrasco) 8/1/2012  crohns's disease   
 stable  Disorder of bone and cartilage, unspecified 8/11/2014  Dyslipidemia 6/13/2016  GERD (gastroesophageal reflux disease)   
 under control with med  History of skin cancer   
 ear and right forearm  Hypercholesterolemia  Hypertension  MI (myocardial infarction) ([address] 2001  Obesity 30.5 bmi  
 Other specified inflammatory polyarthropathies(714.89) 1/9/2013  PVC (premature ventricular contraction)   
 on Kardia RS  
 PVC (premature ventricular contraction)  RA (rheumatoid arthritis) (HCC)   
 takes remicaid  Status Post PTCA/stent 6/13/2016  Thrombocytopenia, unspecified ([address] 4/10/2014  Thyroid disease   
 hypo. under control with med  Trochanteric bursitis 5/1/2013  Unspecified sleep apnea   
 not using c-pap Past Surgical History:  
Procedure Laterality Date  HX CATARACT REMOVAL    
 gadiel iol  HX COLONOSCOPY  Dec 2003  HX HEART CATHETERIZATION   and   
 1 stent  then 2 stents  per pt  HX HEENT    
 floater laser surgery  HX HERNIA REPAIR    
 right  HX LUMBAR FUSION  -2011 X 2  
 HX ORTHOPAEDIC    
 back surgery x 2  
 HX OTHER SURGICAL  2017  
 back surgery, Dr Anni Talavera  HX TONSILLECTOMY [facility]    TN SINUS SURGERY PROC UNLISTED Family History:  
Problem Relation Age of Onset  Heart Disease Mother  Hypertension Mother  Heart Disease Father  Hypertension Father  Other Brother AAA  Gall Bladder Disease Brother Social History Socioeconomic History  Marital status:  Spouse name: Not on file  Number of children: Not on file  Years of education: Not on file  Highest education level: Not on file Occupational History  Not on file Social Needs  Financial resource strain: Not on file  Food insecurity Worry: Not on file Inability: Not on file  Transportation needs Medical: Not on file Non-medical: Not on file Tobacco Use  Smoking status: Former Smoker Packs/day: 0.50 Years: 20.00 Pack years: 10.00 Quit date: 1979 Years since quittin.2  Smokeless tobacco: Never Used Substance and Sexual Activity  Alcohol use: No  
 Drug use: No  
 Sexual activity: Not on file Lifestyle  Physical activity Days per week: Not on file Minutes per session: Not on file  Stress: Not on file Relationships  Social connections Talks on phone: Not on file Gets together: Not on file Attends Muslim service: Not on file Active member of club or organization: Not on file Attends meetings of clubs or organizations: Not on file Relationship status: Not on file  Intimate partner violence Fear of current or ex partner: Not on file Emotionally abused: Not on file Physically abused: Not on file Forced sexual activity: Not on file Other Topics Concern  Not on file Social History Narrative Wife has Parkinsons near end stage ALLERGIES: Citric acid and Lisinopril Review of Systems Constitutional: Positive for appetite change and fatigue. Negative for chills and fever. Respiratory: Negative for cough and shortness of breath. Cardiovascular: Negative for chest pain and palpitations. Gastrointestinal: Negative for nausea and vomiting. Genitourinary: Negative for dysuria and hematuria. Musculoskeletal: Positive for arthralgias. Negative for joint swelling. Skin: Positive for color change and rash. Vitals:  
 03/13/21 [id number] 03/13/21 2163 BP: 128/73 Pulse: 78 Resp: 16 Temp:  99.1 °F (37.3 °C) SpO2: 93% Weight: 81.6 kg (180 lb) Height: 5' 9\" (1.753 m) Physical Exam 
Vitals signs and nursing note reviewed. Constitutional:   
   Appearance: Normal appearance. HENT:  
   Head: Normocephalic and atraumatic. Cardiovascular:  
   Rate and Rhythm: Normal rate and regular rhythm. Pulmonary:  
   Effort: Pulmonary effort is normal.  
   Breath sounds: Normal breath sounds. Skin: 
   Comments: Several areas of what appears to be a blistering rash on his left shoulder and upper left chest.  No obvious crusting. Neurological:  
   General: No focal deficit present. Mental Status: He is alert and oriented to person, place, and time. MDM Number of Diagnoses or Management Options Diagnosis management comments: Rash may be consistent with an early shingles. I will check his basic blood work to look at his electrolytes and hemoglobin. ED Course as of Mar 13 1203 Sat Mar 13, 2021  
1158 Patient's ultrasound was negative. I think his slight elevation in his white count and alk phos are probably viral related. I do not think he has anything urgent or emergent.   I will encourage him to continue his acyclovir for his shingles and I will discharge him home with some pain medicine.  
 [AC] ED Course User Index [AC] Subha Joiner MD  
 
 
Procedures

## 2021-03-13 NOTE — ED NOTES
I have reviewed discharge instructions with the patient. The patient verbalized understanding. Patient left ED via Discharge Method: ambulatory to Home with wife. Opportunity for questions and clarification provided. Patient given 1 scripts. To continue your aftercare when you leave the hospital, you may receive an automated call from our care team to check in on how you are doing. This is a free service and part of our promise to provide the best care and service to meet your aftercare needs.  If you have questions, or wish to unsubscribe from this service please call [phone]. Thank you for Choosing our [employer] [facility] Emergency Department.

## 2021-03-13 NOTE — ED TRIAGE NOTES
Pt arrives via ems from home. Shingle to left shoulder. Pt family member called out for pt. Has had both covid vaccines in February. Then developed shingles. Starting Wednesday pt would not raise left arm due to pain. Pt was seen at [facility] recently and sent home with valtrex. Pt a+o for ems. Reports generalized weakness.

## 2021-03-14 PROBLEM — J18.9 PNA (PNEUMONIA): Status: ACTIVE | Noted: 2021-01-01

## 2021-03-14 PROBLEM — Z20.822 SUSPECTED COVID-19 VIRUS INFECTION: Status: ACTIVE | Noted: 2021-01-01

## 2021-03-14 PROBLEM — R78.81 BACTEREMIA: Status: ACTIVE | Noted: 2021-01-01

## 2021-03-14 PROBLEM — A41.9 SEPSIS (HCC): Status: ACTIVE | Noted: 2021-01-01

## 2021-03-14 PROBLEM — G93.41 ACUTE METABOLIC ENCEPHALOPATHY: Status: ACTIVE | Noted: 2021-01-01

## 2021-03-14 NOTE — PROGRESS NOTES
Seen by ID. Now awake and talking. Confused and disoriented. A/O to name only. Thinks it is 2001. Attempted to reorient with some success. To MRI via bed and transport.

## 2021-03-14 NOTE — ED NOTES
TRANSFER - OUT REPORT: 
 
Verbal report given to Braxton Lara RN(name) on Nereyda Schulz  being transferred to [facility](unit) for routine progression of care Report consisted of patients Situation, Background, Assessment and  
Recommendations(SBAR). Information from the following report(s) SBAR and ED Summary was reviewed with the receiving nurse. Lines:  
Peripheral IV 03/13/21 Left Wrist (Active) Peripheral IV 03/13/21 Right Antecubital (Active) Opportunity for questions and clarification was provided. Patient transported with: 
 [employer]

## 2021-03-14 NOTE — PROGRESS NOTES
Hospitalist Progress Note 3/14/2021 Admit Date: 3/13/2021  8:05 PM  
NAME: Brandy Harrington :  1940 MRN:  [id number] Attending: Willard Kong DO 
PCP:  Cristin Wright MD 
 
SUBJECTIVE:  
Patient is a 79 y.o. male with medical history of RA on bi-weekly infusion, CAD, idiopathic thrombocytopenia, hypothyroidism, AAA and HTN presented with daughter at bedside as patient is confused cannot give any information. According to daughter, pt started feeling bad since the second Covid vaccine late February. On 3/10, he was complaining of left shoulder pain associated with confusion and mumbling speech and was taken to . Daughter was concerned that he might be having a stroke. However he was diagnosed with herpes zoster and was sent home. He reportedly had an x-ray done at  of left shoulder with no fracture noted. 
 Patient's symptoms persisted with decreased p.o. intake with associated 5 lbs weight loss. He developed a fever morning of 3/13/2021, daughter gave him a dose of Tylenol and brought pt to the ER and was discharged home. Daughter noticed that patient was having chills and again having T103, hence brought patient back to the emergency room. Patient at baseline was independent with ADL's and actually can care of his wife who has dementia. On admission, patient was only mumbling with difficult to understand speech but did complain of pain on moving the LUE which was in flexed position. He was able to move all other extremities on his own except for left upper extremity but did not really follow verbal commands. ED workup includes WBC 13.2, hemoglobin 9.9, creatinine 1.25, GFR of 59. Rapid Covid test negative. CT head no acute findings. Chest x-ray shows low lung volumes with basilar consolidation. Pt was given Ceftriaxone and Azithromycin in ED. He is admitted for acute metabolic encephalopathy, sepsis secondary to pneumonia. This AM pt was still confused with mumbling speech. Reported headache and L shoulder pain. Did not follow commands. He has been febrile throughout hospital stay. Review of Systems: Unable to assess d/t pt's condition PHYSICAL EXAM  
 
Visit Vitals /69 (BP 1 Location: Right arm, BP Patient Position: At rest) Pulse 94 Temp (!) 103.1 °F (39.5 °C) Resp 20 Ht 5' 9\" (1.753 m) Wt 76.9 kg (169 lb 8.5 oz) SpO2 96% BMI 25.04 kg/m² Temp (24hrs), Av.9 °F (38.8 °C), Min:99.5 °F (37.5 °C), Max:103.3 °F (39.6 °C) Oxygen Therapy O2 Sat (%): 96 % (21 1116) Pulse via Oximetry: 104 beats per minute (21 0001) O2 Device: Room air (21 0155) No intake or output data in the 24 hours ending 21 1520 General: No acute distress. Appears uncomfortable. Mumbles with difficulty to understand speech. Answers yes or no to basic questions appropriately. Oriented x0. Lungs:  Rhonchi on b/l lung bases Heart:  tachycardic rate and regular rhythm,  No murmur, rub, or gallop Abdomen: Soft, Non distended, Non tender, Positive bowel sounds Extremities: No cyanosis, clubbing or edema. Scabbed, erythematous multiple small lesions on L shoulder, no vesicles appreciated. Neurologic:  Unable to assess d/t pt's mentation. Does not follow commands. XR SHOULDER LT AP/LAT MIN 2 V Final Result Mild degenerative change. No acute findings. CT HEAD WO CONT Final Result 1. No CT evidence of acute intracranial process. XR CHEST PORT Final Result Low lung volumes with basilar consolidation. MRI BRAIN WO CONT    (Results Pending) ASSESSMENT Active Hospital Problems Diagnosis Date Noted  Sepsis ([facility] 2021  Acute metabolic encephalopathy   PNA (pneumonia) 2021  Suspected COVID-19 virus infection 2021  AAA (abdominal aortic aneurysm) ([facility] [address] 2013 Followed by Vascular Surgery  Rheumatoid arthritis ([facility] 2012  Hypothyroidism 2012  Mixed hyperlipidemia 08/01/2012  Chronic idiopathic thrombocytopenia ([facility] 08/01/2012 S/P W/U Dr Geovanny Vilchis Plan: 
 
Sepsis Bacteremia Meets sepsis criteria with HR>100, Tmax >100.4, WBC>12K. UA negative for UTI. CXR shows consolidation in L and R lungs bases. Most likely secondary to pneumonia vs ? meningitis with recent headaches in the past several days. Recent diagnosis of herpes zoster and finished second covid vaccine a few weeks ago. Rapid Covid negative. PCR pending. Abx: Broaden to Vanc/Zosyn (3/14-. ..) with worsening sepsis, immunosuppressed and bacteremia BCx 3/13: grew MSSA Obtain TTE Consult ID 
F/u BCx, covid PCR, TTE, ID recs Acute metabolic encephalopathy/delirium-?etiology, ? Meningitis vs AMS 2/2 sepsis per above. Recent zoster. UA negative for UTI. CT head no acute findings. Mumbles, difficult to understand, does not follow commands Avoid sedating meds Fall precautions Delirium precautions NPO until mentation improves PT/OT/SLP to eval and treat Infection management as stated Obtain Vit B12, folate, ammonia, RPR, HIV, TSH/Free T4 
MRI brain pending May need LP if does not improve, defer to ID for assistance Left shoulder pain Had zoster over the posterior aspect of the left shoulder region few days ago. 
-ordered x-ray--no acute findings, only degenerative changes. Poor p.o. intake 
-as per daughter Lost about 5 pounds unintentionally.   
-Nutrition consult. -mIVF: D5 w/ normal saline 
-NPO until mentation improves 
-SLP to assist 
 
Rheumatoid arthritis Holding Leflunomide and Remicade Coronary disease:  
Cont home ASA, statin, ARB Hypothyroidism Cont home synthroid Abdominal aortic aneurysm Cont home statin History of idiopathic thrombocytopenia Monitor CBC Hypertension DC hydrochlorothiazide. Continue losartan DVT Prophylaxis: Lovenox SQ Dispo: Pending improvement. PT/OT ordered. Signed By: Sariah Lam DO  March 14, 2021

## 2021-03-14 NOTE — PROGRESS NOTES
Patient needs speech evaluation before giving po medication due to patient drowsiness and confusion.

## 2021-03-14 NOTE — PROGRESS NOTES
TRANSFER - IN REPORT: 
 
Verbal report received from [facility] RN(name) on Jackson Jenkins  being received from 834(unit) for routine progression of care Report consisted of patients Situation, Background, Assessment and  
Recommendations(SBAR). Information from the following report(s) SBAR was reviewed with the receiving nurse. Opportunity for questions and clarification was provided. Assessment completed upon patients arrival to unit and care assumed.

## 2021-03-14 NOTE — ED PROVIDER NOTES
Patient has a history of coronary artery disease's disease, hypertension, hyperlipidemia, GERD, rheumatoid arthritis and hypothyroidism who presents with altered mental status. Per EMS, he is coming from home. The family states that he has had a decline since he received the second Covid vaccine approximately 3 weeks ago. He was seen here earlier this morning with a slight fever. He was diagnosed with shingles and started on acyclovir. The family told EMS that he got much worse after that visit. He has had a fever and has been confused. There was no vomiting or diarrhea reported. The patient is unable to provide a meaningful history due to his altered mental status. Per EMS, his temp was 103.3 axillary and was tachycardic in the 110 range. His blood pressure was in the 541H systolic initially. Past Medical History:  
Diagnosis Date  Aneurysm ([address]  ASCAD - of the native vessel 6/13/2016  CAD (coronary artery disease) 3 stents  had M.I. 2001  Cancer ([address] melanoma  Chronic pain   
 back  Coagulation disorder ([address]   
 pt denies this  Crohn's colitis (Dr Tamela Trimble) 8/1/2012  crohns's disease   
 stable  Disorder of bone and cartilage, unspecified 8/11/2014  Dyslipidemia 6/13/2016  GERD (gastroesophageal reflux disease)   
 under control with med  History of skin cancer   
 ear and right forearm  Hypercholesterolemia  Hypertension  MI (myocardial infarction) ([address] 2001  Obesity 30.5 bmi  
 Other specified inflammatory polyarthropathies(714.89) 1/9/2013  PVC (premature ventricular contraction)   
 on Kardia RS  
 PVC (premature ventricular contraction)  RA (rheumatoid arthritis) ([facility])   
 takes remicaid  Status Post PTCA/stent 6/13/2016  Thrombocytopenia, unspecified ([address] 4/10/2014  Thyroid disease   
 hypo. under control with med  Trochanteric bursitis 5/1/2013  Unspecified sleep apnea   
 not using c-pap Past Surgical History:  
Procedure Laterality Date  HX CATARACT REMOVAL    
 gadiel iol  HX COLONOSCOPY  Dec 2003  HX HEART CATHETERIZATION   and   
 1 stent  then 2 stents  per pt  HX HEENT    
 floater laser surgery  HX HERNIA REPAIR    
 right  HX LUMBAR FUSION  -2011 X 2  
 HX ORTHOPAEDIC    
 back surgery x 2  
 HX OTHER SURGICAL  2017  
 back surgery, Dr Vitaly Pal  HX TONSILLECTOMY [facility]    LA SINUS SURGERY PROC UNLISTED Family History:  
Problem Relation Age of Onset  Heart Disease Mother  Hypertension Mother  Heart Disease Father  Hypertension Father  Other Brother AAA  Gall Bladder Disease Brother Social History Socioeconomic History  Marital status:  Spouse name: Not on file  Number of children: Not on file  Years of education: Not on file  Highest education level: Not on file Occupational History  Not on file Social Needs  Financial resource strain: Not on file  Food insecurity Worry: Not on file Inability: Not on file  Transportation needs Medical: Not on file Non-medical: Not on file Tobacco Use  Smoking status: Former Smoker Packs/day: 0.50 Years: 20.00 Pack years: 10.00 Quit date: 1979 Years since quittin.2  Smokeless tobacco: Never Used Substance and Sexual Activity  Alcohol use: No  
 Drug use: No  
 Sexual activity: Not on file Lifestyle  Physical activity Days per week: Not on file Minutes per session: Not on file  Stress: Not on file Relationships  Social connections Talks on phone: Not on file Gets together: Not on file Attends [employer] service: Not on file Active member of club or organization: Not on file Attends meetings of clubs or organizations: Not on file Relationship status: Not on file  Intimate partner violence Fear of current or ex partner: Not on file Emotionally abused: Not on file Physically abused: Not on file Forced sexual activity: Not on file Other Topics Concern  Not on file Social History Narrative Wife has Parkinsons near end stage ALLERGIES: Citric acid and Lisinopril Review of Systems Unable to perform ROS: Mental status change Constitutional: Positive for fever. Vitals:  
 03/13/21 2009 Temp: (!) 103.3 °F (39.6 °C) Weight: 81.6 kg (180 lb) Height: 5' 9\" (1.753 m) Physical Exam 
Vitals signs and nursing note reviewed. Constitutional:   
   Appearance: Normal appearance. He is well-developed and normal weight. HENT:  
   Head: Normocephalic and atraumatic. Eyes:  
   Conjunctiva/sclera: Conjunctivae normal.  
   Pupils: Pupils are equal, round, and reactive to light. Neck: Musculoskeletal: Normal range of motion and neck supple. No neck rigidity. Cardiovascular:  
   Rate and Rhythm: Normal rate and regular rhythm. Pulmonary:  
   Effort: Pulmonary effort is normal. No respiratory distress. Abdominal:  
   General: Bowel sounds are normal. There is no distension. Palpations: Abdomen is soft. There is no mass. Tenderness: There is no abdominal tenderness. There is no guarding. Hernia: No hernia is present. Musculoskeletal: Normal range of motion. General: No swelling. Right lower leg: No edema. Left lower leg: No edema. Skin: 
   General: Skin is warm and dry. Neurological:  
   Comments: Patient's eyes are closed, speaks unintelligibly MDM Number of Diagnoses or Management Options Altered mental status, unspecified altered mental status type: new and requires workup Fever, unspecified fever cause: new and requires workup Leukocytosis, unspecified type: new and requires workup Pneumonia of left lower lobe due to infectious organism: new and requires workup Diagnosis management comments: 8:23 PM patient likely septic, antibiotics will be started along with IV fluids. 11:30 PM lactic acid is normal.  Chest x-ray shows possible left base pneumonia, antibiotics have already been started. Urinalysis is unremarkable. Hospitalist has been contacted, to see patient for admission. Amount and/or Complexity of Data Reviewed Clinical lab tests: ordered and reviewed Tests in the radiology section of CPT®: ordered and reviewed Obtain history from someone other than the patient: yes Discuss the patient with other providers: yes Risk of Complications, Morbidity, and/or Mortality Presenting problems: high Diagnostic procedures: moderate Management options: moderate Patient Progress Patient progress: improved Procedures

## 2021-03-14 NOTE — H&P
[employer] [facility]  
History and Physical  
 
 
Name:  Corky Suarez  Age:80 y.o. Sex:male :  1940 MRN:  [id number] PCP:  Nayeli Sethi MD 
 
 
Admit Date:  3/13/2021  8:05 PM  
Chief Complaint: FEVER, CONFUSION, DECREASED PO INTAKE,LEFT SHOULDER PAIN Reason for Admission:  
Sepsis ([facility] [A41.9] Acute metabolic encephalopathy [M00.66] Assessment & Plan:  
Sepsis-secondary to pneumonia Community-acquired pneumonia-continue Rocephin Zithromax Acute metabolic encephalopathy/delirium-?etiology. CT head no acute findings. mumbles, difficult to understand. On moving left upper extremity would say pain. Recent zoster. May need MRI if no improvement in mentation. Left shoulder pain-ordered x-ray. Had zoster over the posterior aspect of the left shoulder region few days ago. Poor p.o. intake-as per daughter Lost about 5 pounds. Nutrition consult. D5 normal saline Rheumatoid arthritis Coronary disease Hypothyroidism Abdominal aortic aneurysm History of idiopathic thrombocytopenia Hypertension -DC hydrochlorothiazide. Continue losartan Disposition: Home versus rehab Diet: DIET REGULAR 
VTE ppx: Heparin GI ppx: Famotidine CODE STATUS: DNR Surrogate decision-maker: Daughter History of Presenting Illness:  
 
Corky Suarez is a 79 y.o. male with medical history of rheumatoid arthritis on twice weekly infusion, coronary disease, idiopathic thrombocytopenia, hypothyroidism, abdominal aortic aneurysm and hypertension. History from the daughter at the bedside as patient is confused cannot give any information. No review of system could not be elicited from the patient. 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 Normally awake alert mobile does not use any assist device. He started feeling bad since the second Covid vaccine late February.  
 
On Wednesday he was complaining of left shoulder pain, confused, mumbling-was taken to [facility] as daughter felt that he might be having a stroke, he was diagnosed with zoster and sent home. Had a x-ray done at [facility] for left shoulder with no fracture noted. Patient symptoms persisted, decreased p.o. intake, daughter felt that hematologic around 5 pounds. Developed a fever morning of 3/13/2021, daughter gave him a dose of Tylenol, brought to the emergency room and was discharged home. Daughter noticed that patient was having chills again having a fever around 103 hence brought patient back to the emergency room. Patient mumbles, difficult to understand, does complain of pain on moving the left upper extremity which is in flexed position. Able to move all other extremities on his own except for left upper extremity but does not really follow verbal commands. WBC 13.2, hemoglobin 9.9, creatinine 1.25, GFR of 59. Rapid Covid test negative. CT head no acute findings. Chest x-ray-IMPRESSION Low lung volumes with basilar consolidation. Review of Systems: A 14 point review of systems was taken and pertinent positive as per HPI. Past Medical History:  
Diagnosis Date  Aneurysm ([address]  ASCAD - of the native vessel 6/13/2016  CAD (coronary artery disease) 3 stents  had M.I. 2001  Cancer ([address] melanoma  Chronic pain   
 back  Coagulation disorder ([address]   
 pt denies this  Crohn's colitis (Dr Jessica Temple) 8/1/2012  crohns's disease   
 stable  Disorder of bone and cartilage, unspecified 8/11/2014  Dyslipidemia 6/13/2016  GERD (gastroesophageal reflux disease)   
 under control with med  History of skin cancer   
 ear and right forearm  Hypercholesterolemia  Hypertension  MI (myocardial infarction) ([address] 2001  Obesity 30.5 bmi  
 Other specified inflammatory polyarthropathies(714.89) 1/9/2013  PVC (premature ventricular contraction)   
 on Kardia RS  
 PVC (premature ventricular contraction)  RA (rheumatoid arthritis) (HCC)   
 takes remicaid  Status Post PTCA/stent 2016  Thrombocytopenia, unspecified ([facility] [address] 4/10/2014  Thyroid disease   
 hypo. under control with med  Trochanteric bursitis 2013  Unspecified sleep apnea   
 not using c-pap Past Surgical History:  
Procedure Laterality Date  HX CATARACT REMOVAL    
 gadiel iol  HX COLONOSCOPY  Dec 2003  HX HEART CATHETERIZATION   and   
 1 stent  then 2 stents  per pt  HX HEENT    
 floater laser surgery  HX HERNIA REPAIR    
 right  HX LUMBAR FUSION  -2011 X 2  
 HX ORTHOPAEDIC    
 back surgery x 2  
 HX OTHER SURGICAL  2017  
 back surgery, Dr Pam Willis  HX TONSILLECTOMY [address] [facility]    [place] SINUS SURGERY PROC UNLISTED Family History : reviewed Family History Problem Relation Age of Onset  Heart Disease Mother  Hypertension Mother  Heart Disease Father  Hypertension Father  Other Brother AAA  Gall Bladder Disease Brother Social History Tobacco Use  Smoking status: Former Smoker Packs/day: 0.50 Years: 20.00 Pack years: 10.00 Quit date: 1979 Years since quittin.2  Smokeless tobacco: Never Used Substance Use Topics  Alcohol use: No  
 
 
Allergies Allergen Reactions  Citric Acid Other (comments)  Lisinopril Other (comments) lightheadedness Immunization History Administered Date(s) Administered  COVID-19, PFIZER, MRNA, LNP-S, PF, 30MCG/0.3ML DOSE 2021, 2021  Influenza High Dose Vaccine PF 10/06/2016, 2017, 2018, 2019, 2019  Influenza Vaccine 2013, 10/07/2014  Influenza Vaccine PF 10/13/2015  Influenza, Quadrivalent, Adjuvanted (>65 Yrs FLUAD QUAD [id number]) 2020  Pneumococcal Conjugate (PCV-13) 2015  Pneumococcal Polysaccharide (PPSV-23) 2006  TB Skin Test (PPD) 02/01/2016, 03/22/2017  TB Skin Test (PPD) Intradermal 07/06/2017  Tdap 02/03/2018, 05/11/2019  Zoster Vaccine, Live 01/01/2010 PTA Medications: 
Current Outpatient Medications Medication Instructions  aspirin delayed-release 81 mg, Oral, EVERY BEDTIME  atorvastatin (LIPITOR) 40 mg tablet TAKE 1 TABLET EVERY EVENING  
 diclofenac (VOLTAREN) 4 g, Topical, 4 TIMES DAILY  fluticasone (FLONASE) 50 mcg/actuation nasal spray 2 Sprays, Both Nostrils, AS NEEDED  
 INFLIXIMAB (REMICADE IV) IntraVENous, Every 8 weeks  leflunomide (ARAVA) 20 mg tablet One daily  levothyroxine (SYNTHROID) 100 mcg, Oral, DAILY BEFORE BREAKFAST  losartan-hydroCHLOROthiazide (HYZAAR) 100-12.5 mg per tablet 1 Tab, Oral, DAILY WITH DINNER  
 nitroglycerin (NITROSTAT) 0.4 mg, SubLINGual, EVERY 5 MIN AS NEEDED  
 traMADoL (ULTRAM) 50 mg, Oral, EVERY 8 HOURS AS NEEDED Objective:  
 
Patient Vitals for the past 24 hrs: 
 Temp Pulse Resp BP SpO2  
03/14/21 0139 99.5 °F (37.5 °C) (!) 103 22 93/66 93 % 03/14/21 0001  (!) 103 22 123/63 94 % 03/13/21 2341  (!) 101 14 122/63 94 % 03/13/21 2320  100 20 119/60 93 % 03/13/21 2303  100 16 128/65 95 % 03/13/21 2230  (!) 111 22 (!) 127/58 95 % 03/13/21 2221  (!) 108 25 (!) 148/96 96 % 03/13/21 2201  96 19 117/64 95 % 03/13/21 2141  89 16 (!) 114/59 98 % 03/13/21 2120  95 17 (!) 113/58 97 % 03/13/21 2101  (!) 103 18 130/65 95 % 03/13/21 2009 (!) 103.3 °F (39.6 °C) (!) (P) 106 (P) 22   Oxygen Therapy O2 Sat (%): 93 % (03/14/21 0139) Pulse via Oximetry: 104 beats per minute (03/14/21 0001) Body mass index is 25.04 kg/m². Physical Exam: 
 
General: Awake, mumbles, difficult to understand, does not follow verbal commands HEENT: Bilateral cataract surgery, left pupil mildly dilated both pupils reacting to light. Dry mucous membrane Neck: Supple, no lymphadenopathy, no JVD Lungs: Coarse breath sounds Cardiovascular: Regular rate and rhythm with normal S1 and S2 Abdomen: Soft, nontender, nondistended, normoactive bowel sounds Extremities: Left upper extremity flexed at elbow placed on left chest.  Complains of pain on moving left upper extremity. Did not notice any focal swelling/inflammatory findings. Neuro: Mumbles, moves on his own all extremities except for left upper extremity, complains of pain on trying to move left upper extremity passively Psych: Confused Skin-dry ?zoster lesions on the left shoulder region Data Reviewed: I have reviewed all labs, meds, and studies. Recent Results (from the past 24 hour(s)) CBC WITH AUTOMATED DIFF Collection Time: 03/13/21  9:42 AM  
Result Value Ref Range WBC 12.9 (H) 4.3 - 11.1 K/uL  
 RBC 3.65 (L) 4.23 - 5.6 M/uL  
 HGB 11.1 (L) 13.6 - 17.2 g/dL HCT 35.6 (L) 41.1 - 50.3 % MCV 97.5 79.6 - 97.8 FL  
 MCH 30.4 26.1 - 32.9 PG  
 MCHC 31.2 (L) 31.4 - 35.0 g/dL  
 RDW 13.4 11.9 - 14.6 % PLATELET 363 529 - 214 K/uL MPV 8.9 (L) 9.4 - 12.3 FL ABSOLUTE NRBC 0.00 0.0 - 0.2 K/uL  
 DF AUTOMATED NEUTROPHILS 84 (H) 43 - 78 % LYMPHOCYTES 7 (L) 13 - 44 % MONOCYTES 9 4.0 - 12.0 % EOSINOPHILS 0 (L) 0.5 - 7.8 % BASOPHILS 0 0.0 - 2.0 % IMMATURE GRANULOCYTES 1 0.0 - 5.0 %  
 ABS. NEUTROPHILS 10.8 (H) 1.7 - 8.2 K/UL  
 ABS. LYMPHOCYTES 0.9 0.5 - 4.6 K/UL  
 ABS. MONOCYTES 1.1 0.1 - 1.3 K/UL  
 ABS. EOSINOPHILS 0.0 0.0 - 0.8 K/UL  
 ABS. BASOPHILS 0.0 0.0 - 0.2 K/UL  
 ABS. IMM. GRANS. 0.1 0.0 - 0.5 K/UL METABOLIC PANEL, COMPREHENSIVE Collection Time: 03/13/21  9:42 AM  
Result Value Ref Range Sodium 136 (L) 138 - 145 mmol/L Potassium 4.3 3.5 - 5.1 mmol/L Chloride 103 98 - 107 mmol/L  
 CO2 28 21 - 32 mmol/L Anion gap 5 (L) 7 - 16 mmol/L Glucose 132 (H) 65 - 100 mg/dL BUN 26 (H) 8 - 23 MG/DL Creatinine 1.12 0.8 - 1.5 MG/DL  
 GFR est AA >60 >60 ml/min/1.73m2 GFR est non-AA >60 >60 ml/min/1.73m2  Calcium 8.8 8.3 - 10.4 MG/DL Bilirubin, total 0.7 0.2 - 1.1 MG/DL  
 ALT (SGPT) 39 12 - 65 U/L  
 AST (SGOT) 40 (H) 15 - 37 U/L Alk. phosphatase 251 (H) 50 - 136 U/L Protein, total 7.3 6.3 - 8.2 g/dL Albumin 2.2 (L) 3.2 - 4.6 g/dL Globulin 5.1 (H) 2.3 - 3.5 g/dL A-G Ratio 0.4 (L) 1.2 - 3.5    
CBC WITH AUTOMATED DIFF Collection Time: 03/13/21  8:14 PM  
Result Value Ref Range WBC 13.2 (H) 4.3 - 11.1 K/uL  
 RBC 3.29 (L) 4.23 - 5.6 M/uL HGB 9.9 (L) 13.6 - 17.2 g/dL HCT 31.5 (L) 41.1 - 50.3 % MCV 95.7 79.6 - 97.8 FL  
 MCH 30.1 26.1 - 32.9 PG  
 MCHC 31.4 31.4 - 35.0 g/dL  
 RDW 13.5 11.9 - 14.6 % PLATELET 071 890 - 731 K/uL MPV 9.0 (L) 9.4 - 12.3 FL ABSOLUTE NRBC 0.00 0.0 - 0.2 K/uL  
 DF AUTOMATED NEUTROPHILS 90 (H) 43 - 78 % LYMPHOCYTES 3 (L) 13 - 44 % MONOCYTES 6 4.0 - 12.0 % EOSINOPHILS 0 (L) 0.5 - 7.8 % BASOPHILS 0 0.0 - 2.0 % IMMATURE GRANULOCYTES 1 0.0 - 5.0 %  
 ABS. NEUTROPHILS 11.9 (H) 1.7 - 8.2 K/UL  
 ABS. LYMPHOCYTES 0.4 (L) 0.5 - 4.6 K/UL  
 ABS. MONOCYTES 0.8 0.1 - 1.3 K/UL  
 ABS. EOSINOPHILS 0.0 0.0 - 0.8 K/UL  
 ABS. BASOPHILS 0.0 0.0 - 0.2 K/UL  
 ABS. IMM. GRANS. 0.1 0.0 - 0.5 K/UL METABOLIC PANEL, COMPREHENSIVE Collection Time: 03/13/21  8:14 PM  
Result Value Ref Range Sodium 137 136 - 145 mmol/L Potassium 3.9 3.5 - 5.1 mmol/L Chloride 107 98 - 107 mmol/L  
 CO2 25 21 - 32 mmol/L Anion gap 5 (L) 7 - 16 mmol/L Glucose 174 (H) 65 - 100 mg/dL BUN 28 (H) 8 - 23 MG/DL Creatinine 1.25 0.8 - 1.5 MG/DL  
 GFR est AA >60 >60 ml/min/1.73m2 GFR est non-AA 59 (L) >60 ml/min/1.73m2 Calcium 8.2 (L) 8.3 - 10.4 MG/DL Bilirubin, total 0.7 0.2 - 1.1 MG/DL  
 ALT (SGPT) 36 12 - 65 U/L  
 AST (SGOT) 43 (H) 15 - 37 U/L Alk. phosphatase 253 (H) 50 - 136 U/L Protein, total 6.7 6.3 - 8.2 g/dL Albumin 1.8 (L) 3.2 - 4.6 g/dL Globulin 4.9 (H) 2.3 - 3.5 g/dL A-G Ratio 0.4 (L) 1.2 - 3.5 LACTIC ACID  Collection Time: 03/13/21 8:14 PM  
Result Value Ref Range Lactic acid 1.8 0.4 - 2.0 MMOL/L  
URINALYSIS W/ RFLX MICROSCOPIC Collection Time: 03/13/21  8:14 PM  
Result Value Ref Range Color FRANCISCO Appearance CLEAR Specific gravity 1.026 (H) 1.001 - 1.023    
 pH (UA) 5.5 5.0 - 9.0 Protein 30 (A) NEG mg/dL Glucose Negative mg/dL Ketone Negative NEG mg/dL Bilirubin SMALL (A) NEG Blood Negative NEG Urobilinogen 1.0 0.2 - 1.0 EU/dL Nitrites Negative NEG Leukocyte Esterase Negative NEG    
 WBC 0-3 0 /hpf  
 RBC 5-10 0 /hpf Epithelial cells 0-3 0 /hpf Bacteria 0 0 /hpf Casts 3-5 0 /lpf SARS-COV-2 Collection Time: 03/13/21 11:57 PM  
Result Value Ref Range SARS-CoV-2 Please find results under separate order COVID-19 RAPID TEST Collection Time: 03/13/21 11:57 PM  
Result Value Ref Range Specimen source Nasopharyngeal    
 COVID-19 rapid test Not detected NOTD    
 
 
EKG Results Procedure 720 Value Units Date/Time EKG, 12 LEAD, INITIAL [[id number]] Order Status: Completed All Micro Results Procedure Component Value Units Date/Time COVID-19 RAPID TEST [[id number]] Collected: 03/13/21 2357 Order Status: Completed Specimen: Nasopharyngeal Updated: 03/14/21 0038 Specimen source Nasopharyngeal     
  COVID-19 rapid test Not detected Comment:     
The specimen is NEGATIVE for SARS-CoV-2, the novel coronavirus associated with COVID-19. A negative result does not rule out COVID-19. This test has been authorized by the FDA under an Emergency Use Authorization (EUA) for use by authorized laboratories. Fact sheet for Healthcare Providers: ConventionUpdate.co.nz Fact sheet for Patients: ConventionUpdate.co.nz Methodology: Isothermal Nucleic Acid Amplification COVID-19 RAPID TEST [[id number]] Collected: 03/13/21 2357 Order Status: Canceled  CULTURE, BLOOD [[id number]] Collected: 03/13/21 2014 Order Status: Completed Specimen: Blood Updated: 03/13/21 2207 CULTURE, BLOOD [[id number]] Collected: 03/13/21 2014 Order Status: Completed Specimen: Blood Updated: 03/13/21 2207 Other Studies: 
Ct Head Wo Cont Result Date: 3/14/2021 EXAM: CT HEAD WO CONT HISTORY: .  TECHNIQUE: Axial images of the brain were performed without the administration of intravenous contrast. Images were obtained axial plane and coronal reformatted images were submitted. CT scan performed using appropriate/available dose optimization/reduction/ALARA techniques. COMPARISON: 2/6/2018 FINDINGS: There is no evidence of acute intracranial hemorrhage, midline shift, or mass effect. No intra or extra-axial fluid collections are observed. There are moderate chronic appearing microangiopathic changes within the periventricular white matter. No evidence of acute confluent territorial infarction. Ventricles and basal cisterns are patent and symmetric. There is moderate to severe generalized volume loss. Visualized paranasal sinuses and mastoid air cells are without significant fluid. Scalp, soft tissues, and calvarium are within normal limits. 1. No CT evidence of acute intracranial process. 4418 [facility] Result Date: 3/13/2021 Exam: Right upper quadrant ultrasound. Indication: Right upper quadrant pain with elevated white blood cell count. Normal LFTs. Comparison: Abdomen and pelvis CT dated July 03, 2012. Findings: Pancreas: Limited evaluation of the pancreatic head without evidence of a mass or peripancreatic fluid collection. Liver: Heterogeneous echogenicity and coarsened echotexture. No suspicious hepatic masses. The main portal vein is patent with an appropriate direction of flow. Gallbladder: Gallbladder wall is normal in thickness. The gallbladder is not distended. No evidence of cholelithiasis. Echogenic layering biliary sludge evident. No pericholecystic fluid.  Absent sonographic Roni Jose Martin sign per the sonographer. Biliary: No intra or extrahepatic biliary duct dilatation. Right kidney: The right kidney is normal in echogenicity and normal in size measuring 10.2 cm. No contour deforming mass. No hydronephrosis or perinephric fluid. Abdominal Aorta: Abdominal aortic aneurysm measuring 4.2 x 5.2 cm. Inferior Vena Cava: Patent. Ascites: None. 1.  Echogenic layering biliary sludge without evidence of acute cholecystitis. 2.  Abdominal aortic aneurysm measuring 4.2 x 5.2 cm. CTA abdomen can be considered if clinically indicated. Xr Chest Timmy Tolliver Result Date: 3/13/2021 PORTAL CHEST 1 VIEW HISTORY: Fever COMPARISON: Abril 3, 2014 FINDINGS: EKG leads are present. The lung volumes are diminished. There is consolidation in the left lung base with faint airspace opacities in the right lung base as well. Low lung volumes with basilar consolidation. Medications: 
Medications Administered   
 acetaminophen (TYLENOL) suppository 650 mg   
 Admin Date 
03/13/2021 Action Given Dose 650 mg Route Rectal Administered By 
Jonathon Andres RN  
  
  
 azithromycin (ZITHROMAX) 500 mg in 0.9% sodium chloride 250 mL (VIAL-MATE) Admin Date 
03/13/2021 Action Given Dose 
500 mg Rate 
250 mL/hr Route IntraVENous Administered By 
Jonathon Andres RN  
  
  
 cefTRIAXone (ROCEPHIN) 1 g in 0.9% sodium chloride (MBP/ADV) 50 mL MBP Admin Date 
03/13/2021 Action New Bag Dose 1 g Rate 
100 mL/hr Route IntraVENous Administered By 
Jonathon Andres RN  
  
  
 sodium chloride 0.9 % bolus infusion 1,000 mL Admin Date 
03/13/2021 Action New Bag Dose 
1,000 mL Rate 2,448 mL/hr Route IntraVENous Administered By 
Jonathon Andres RN Admin Date 
03/13/2021 Action New Bag Dose 
1,000 mL Rate 2,448 mL/hr Route IntraVENous Administered By 
Jonathon Andres RN  
  
  
 sodium chloride 0.9 % bolus infusion 448 mL Admin Date 
03/13/2021 Action New Bag Dose 448 mL Rate 2,448 mL/hr Route IntraVENous Administered By 
Jonathon Andres RN  
  
  
  
 
 
 
 
Problem List:  
 
Hospital Problems as of 3/14/2021 Date Reviewed: 2/22/2021 Codes Class Noted - Resolved POA Sepsis ([facility] ICD-10-CM: A41.9 ICD-9-CM: 038.9, 995.91  3/14/2021 - Present Unknown Acute metabolic encephalopathy [id number]: G93.41 
ICD-9-CM: 348.31  3/14/2021 - Present Unknown PNA (pneumonia) ICD-10-CM: J18.9 ICD-9-CM: 988  3/14/2021 - Present Unknown Suspected COVID-19 virus infection ICD-10-CM: E15.627 ICD-9-CM: V01.79  3/14/2021 - Present Unknown  
   
 AAA (abdominal aortic aneurysm) (HCC) ICD-10-CM: I71.4 ICD-9-CM: 441.4  2/6/2013 - Present Yes Overview Addendum 2/20/2020 11:27 AM by Standard, Chilo Rojo NP Followed by Vascular Surgery Rheumatoid arthritis ([facility] ICD-10-CM: M06.9 ICD-9-CM: 714.0  8/1/2012 - Present Yes Hypothyroidism ICD-10-CM: E03.9 ICD-9-CM: 244.9  8/1/2012 - Present Yes Mixed hyperlipidemia ICD-10-CM: E78.2 ICD-9-CM: 272.2  8/1/2012 - Present Yes Chronic idiopathic thrombocytopenia (HCC) ICD-10-CM: D69.3 ICD-9-CM: 287.31  8/1/2012 - Present Yes Overview Signed 2/6/2013  6:21 PM by Luna Norris  
  S/P W/U Dr Lyndsay Jones Signed By: Cele Cancino MD  
[employer] [facility]  March 14, 2021  1:36 AM

## 2021-03-14 NOTE — PROGRESS NOTES
Admission assessment complete and patient admitted to 834. Patient is alert but lethargic with confusion. Patient not able to verbalize and patient mumbling incomprehensible words. No pain or distress noted. Patient is rigid when positioning patient. Patient on room air without any breathing difficulty at this time with 02 saturation at 93%. Patient will have telemetry. Patient has brief on and will check patient for B and B. Skin is pale and hot to the touch. No pressure issues at this time. Bed alarm and call light in reach.

## 2021-03-14 NOTE — PROGRESS NOTES
Comprehensive Nutrition Assessment Type and Reason for Visit: Consult Consult for General Nutrition Managment (poor appetite/intake for >5 days) Nutrition Recommendations/Plan:  
 Continue current diet and ONS.  Ordered measured weight  1:1 Feeding assistance at meals Malnutrition Assessment: 
Malnutrition Status: At risk for malnutrition (specify) Context:   10% wt loss over 1 year, consuming <75% of est needs for >3 months Nutrition Assessment:  
Nutrition History: CAD, Crohns, RA, dyslipidemia, HTN, MI     
Nutrition Background: Pt admitted with sepsis and acute metabolic encephalopathy. Has been drowsy, confused, and feverish since admission. Daily Update: 
Spoke with pt's daughter- she reports pt was eating only bites of food PTA despite her fixing 3 meals for him. 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 He has partial dentures and has some difficulty chewing tough meats; she requests pt have feeding assistance at meals. Pt was consuming Ensure 1x daily at breakfast PTA. Was going to PCP office 1x per month due to progressive weight loss. Weight hx reviewed from PCP office visits: 
  3/19/2020 -- 83.1kg 
10/14/2020 -- 76.3kg 
12/31/2020 -- 75.8kg 
  2/17/2021 -- 74.3kg This reflects a 19# (10%) wt loss over 1 year. Nutrition Related Findings:  
deferred Wound Type: None Current Nutrition Therapies: DIET REGULAR 2 GM NA (House Low NA) DIET NUTRITIONAL SUPPLEMENTS All Meals, Dinner, Lunch, Breakfast; Ensure [employer] ( ) Current Intake: Average Meal Intake: Unable to assess Average Supplement Intake: Unable to assess Anthropometric Measures: 
Height: 5' 9\" (175.3 cm) Current Body Wt: 76.7 kg (169 lb), Weight source: Not specified BMI: 24.9, Overweight (BMI 25.0-29. 9) Ideal Body Weight (lbs) (Calculated): 160 lbs (73 kg), 105.6 % Usual Body Wt:  , Percent weight change:   
       
Edema: No data recorded Estimated Daily Nutrient Needs: 
Energy (kcal/day): 1922-2307kcal/d(25-30kcal/kg) (Kcal/kg, Weight Used: Current(listed)) Protein (g/day): 77-92g/d(1-1.2g/kg) Weight Used: (Current) Fluid (ml/day):   ( ) Nutrition Diagnosis:  
· Inadequate oral intake related to (poor appetite) as evidenced by poor intake prior to admission, weight loss Nutrition Interventions:  
Food and/or Nutrient Delivery: Continue current diet, Continue oral nutrition supplement Coordination of Nutrition Care: Feeding assistance/environmental change(Ordered Weight) Goals: Active Goal: Consume 75% of est. needs within 7 days Nutrition Monitoring and Evaluation:  
  
Food/Nutrient Intake Outcomes: Supplement intake Physical Signs/Symptoms Outcomes: Weight Discharge Planning:   
Continue oral nutrition supplement Rosalinda, [address] [place] [address]

## 2021-03-14 NOTE — PROGRESS NOTES
0220 -Tylenol 650 mg suppository given for temperature of 103 and will assess medication effectiveness.

## 2021-03-14 NOTE — PROGRESS NOTES
Patient resting in bed with no pain noted. Bed alarm on and call light reach. Will prepare report for oncoming nurse.

## 2021-03-14 NOTE — PROGRESS NOTES
Pharmacokinetic Consult to Pharmacist 
 
Irma Dago is a 79 y.o. male being treated for HAP with vancomycin and pip/tazo. Height: 5' 9\" (175.3 cm)  Weight: 76.9 kg (169 lb 8.5 oz) Lab Results Component Value Date/Time BUN 28 (H) 03/14/2021 06:46 AM  
 Creatinine 1.22 03/14/2021 06:46 AM  
 WBC 13.2 (H) 03/13/2021 08:14 PM  
 Lactic acid 1.8 03/13/2021 08:14 PM  
  
Estimated Creatinine Clearance: 47.5 mL/min (based on SCr of 1.22 mg/dL). No results found for: Carla Naqvi Day 1 of vancomycin. Goal trough is 15-20. Dosing started with 2g x 1 and then 1.25g q24h. Will continue to follow patient. Thank you, Hector Kelley, PharmD, [facility] Clinical Pharmacist 
[phone]

## 2021-03-14 NOTE — PROGRESS NOTES
Attempted Brain MRI. Patient yelling and crying for help, despite reassurance, straps and multiple cushions had to end study due to concern for patient's safety. Now trying to pull out catheter while awaiting transport, trying to redirect and reassure. Called unit to give message to RN as well as transport.

## 2021-03-14 NOTE — PROGRESS NOTES
TRANSFER - OUT REPORT: 
 
Verbal report given to [facility]) on Carlos Wen  being transferred to [facility](unit) for routine progression of care Report consisted of patients Situation, Background, Assessment and  
Recommendations(SBAR). Information from the following report(s) SBAR was reviewed with the receiving nurse. Lines:  
Peripheral IV 03/13/21 Left Wrist (Active) Peripheral IV 03/13/21 Right Antecubital (Active) Opportunity for questions and clarification was provided. Patient transported with: 
 [employer]

## 2021-03-14 NOTE — PROGRESS NOTES
Verbal bedside report given to oncoming nurse. Patient's situation, background, assessment and recommendations provided. Opportunity for questions provided. No s/s of pain noted. No distress noted. Oncoming RN assumed care of patient.

## 2021-03-14 NOTE — PROGRESS NOTES
Attempted evaluation but Mr. Dayna Marinelli is too drowsy and with fever this morning. Will try tomorrow.  
No Linton, PT

## 2021-03-14 NOTE — ACP (ADVANCE CARE PLANNING)
Advance care planninginitial encounter Virtual call time due to pt being Covid r/o - 30 minutes virtual call time spent discussion the care Pt's decision making capacity NO Names of POA/surrogate decision maker when patient is altered 
:Spouse Ethan Bardales and Daughter Tami Kennedy Members present in the meeting : Drake Kennedy Patient / surrogate decision-maker ultimately chose. Not resuscitate, DO NOT INTUBATE, but okay for other aggressive medical and surgical intervention Further discussion regarding principle disease process. prognosis, plans of care, and treatment goals 
:  
Called daughter Tami Kennedy as requested by her through RN to discuss pt's code status. On admission it was expressed that pt would like to be a DNR. Daughter wanted clarification in code status and whether she should change him to resuscitate but do not intubate. I updated daughter on her father's current condition. He seems to have not made much progress since admission last night. This morning his blood cultures now showing bacteremia. We are broadening his antibiotics and have gotten ID specialists on board to assist with care. Overall his prognosis is still guarded. Daughter was tearful about her father being so sick and sad that she is not able to visit him due to covid rule out. She expressed that her father expressed in the past that he did not want to be dependent on any life support. She also expressed that pt had seen his brother in the hospital in the past going through the coding/resuscitation process and that had scarred him. She does not think that her father would want to go through all that but is afraid of losing him.  Discussed with daughter about the resuscitation process and how it could be a very aggressive process as her father had witnessed in the past. However, we are here to support whatever decision pt wishes and what family would wish for him as he is not decisional. She has elected for him to remain a DNR/DNI at this point. We will continue with ongoing goals of care discussion with family and update them throughout hospital course. Daughter was appreciative of our conversation.   
 
Dl Ill, DO

## 2021-03-14 NOTE — PROGRESS NOTES
Returned back to room from MRI via bed and transport. Unable to complete studies due to pt's agitation and confusion. Perfect serve Dr. Rosa Elena Mancera on call physician and made aware. No new orders at present. Will continue to monitor.

## 2021-03-14 NOTE — CONSULTS
Infectious Disease Consult Today's Date: 3/14/2021 Admit Date: 3/13/2021 Impression: · MSSA bacteremia (3/13); repeat blood cx pending; TTE pending; source · Acute nontraumatic L shoulder pain · Fever (103) · RA; on immunosuppressive infusions twice per week · CAD · Aortic Aneurysm · HTN · Throbocytopenia Plan:  
· Stop Zosyn & Vancomycin · Start IV Nafcillin · MRI L shoulder · Check APR's · Repeat blood cx · Await TTE Anti-infectives: · IV Zosyn (3/14- 
· IV Vancomycin (3/14) · IV Rocephin (3/13) · IV Zithromax (3/13-3/14) Subjective:  
Date of Consultation:  March 14, 2021 Referring Physician: Dr. Meng Zazueta 
 
Patient is a 79 y.o. male  with medical history of rheumatoid arthritis on twice weekly infusion, coronary disease, idiopathic thrombocytopenia, hypothyroidism, abdominal aortic aneurysm and hypertension. 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 Normally awake alert mobile does not use any assist device. He started feeling bad since the second Covid vaccine late February. On Wednesday, 3/10 he was complaining of left shoulder pain, confused, mumbling-was taken to  as daughter felt that he might be having a stroke, he was diagnosed with zoster and sent home. Had a x-ray done at  for left shoulder with no fracture noted. Patient symptoms persisted, decreased p.o. intake. Developed a fever morning of 3/13/2021, daughter gave him a dose of Tylenol, brought to the emergency room and was discharged home. Daughter noticed that patient was having chills again having a fever around 103 hence brought patient back to the emergency room. On arrival patient mumbled, difficult to understand, does complain of pain on moving the left upper extremity which is in flexed position. Able to move all other extremities on his own except for left upper extremity but does not really follow verbal commands.   WBC 13.2, hemoglobin 9.9, creatinine 1.25, GFR of 59. Rapid Covid test negative. CT head no acute findings. Chest x-ray-IMPRESSION Low lung volumes with basilar consolidation. MSSA, by PCR, noted in admission blood cx (both sets).  
  
 
Patient Active Problem List  
Diagnosis Code  Lumbar stenosis with neurogenic claudication M48.062  Rheumatoid arthritis ([facility] M06.9  Hypothyroidism E03.9  Mixed hyperlipidemia E78.2  Chronic idiopathic thrombocytopenia (HCC) D69.3  AAA (abdominal aortic aneurysm) ([facility]) I71.4  Dyspepsia, controlled by Zantac R10.13  Ophthalmic migraine G43. 310 [facility]  Anemia, chronic disease D63.8  HARI (obstructive sleep apnea) G47.33  
 Pleural plaque with presence of asbestos J92.0  Left knee DJD M17.12  
 Adjustment disorder F43.20  Essential hypertension, benign I10  
 ASCAD - of the native vessel I25.10  PVC (premature ventricular contraction) I49.3  Sepsis ([facility] A41.9  Acute metabolic encephalopathy P42.36  
 PNA (pneumonia) J18.9  Suspected COVID-19 virus infection Z20.822 Past Medical History:  
Diagnosis Date  Aneurysm ([facility]  ASCAD - of the native vessel 6/13/2016  CAD (coronary artery disease) 3 stents  had M.I. 2001  Cancer ([facility] melanoma  Chronic pain   
 back  Coagulation disorder ([facility]   
 pt denies this  Crohn's colitis (Dr Rogena Meigs) 8/1/2012  crohns's disease   
 stable  Disorder of bone and cartilage, unspecified 8/11/2014  Dyslipidemia 6/13/2016  GERD (gastroesophageal reflux disease)   
 under control with med  History of skin cancer   
 ear and right forearm  Hypercholesterolemia  Hypertension  MI (myocardial infarction) ([address] 2001  Obesity 30.5 bmi  
 Other specified inflammatory polyarthropathies(714.89) 1/9/2013  PVC (premature ventricular contraction)   
 on Kardia RS  
 PVC (premature ventricular contraction)  RA (rheumatoid arthritis) ([facility])   
 takes remicaid  Status Post PTCA/stent 2016  Thrombocytopenia, unspecified ([address] 4/10/2014  Thyroid disease   
 hypo. under control with med  Trochanteric bursitis 2013  Unspecified sleep apnea   
 not using c-pap Family History Problem Relation Age of Onset  Heart Disease Mother  Hypertension Mother  Heart Disease Father  Hypertension Father  Other Brother AAA  Gall Bladder Disease Brother Social History Tobacco Use  Smoking status: Former Smoker Packs/day: 0.50 Years: 20.00 Pack years: 10.00 Quit date: 1979 Years since quittin.2  Smokeless tobacco: Never Used Substance Use Topics  Alcohol use: No  
 
Past Surgical History:  
Procedure Laterality Date  HX CATARACT REMOVAL    
 gadiel iol  HX COLONOSCOPY  Dec 2003  HX HEART CATHETERIZATION   and   
 1 stent  then 2 stents  per pt  HX HEENT    
 floater laser surgery  HX HERNIA REPAIR    
 right  HX LUMBAR FUSION  -2011 X 2  
 HX ORTHOPAEDIC    
 back surgery x 2  
 HX OTHER SURGICAL  2017  
 back surgery, Dr Doyle Chaves  HX TONSILLECTOMY [facility]    CT SINUS SURGERY PROC UNLISTED Prior to Admission medications Medication Sig Start Date End Date Taking? Authorizing Provider  
traMADoL (Ultram) 50 mg tablet Take 1 Tab by mouth every eight (8) hours as needed for Pain for up to 3 days. Max Daily Amount: 150 mg. 3/13/21 3/16/21  Chloe Hernandez MD  
levothyroxine (SYNTHROID) 100 mcg tablet Take 1 Tab by mouth Daily (before breakfast). 21   Fito Reid MD  
losartan-hydroCHLOROthiazide [facility]) 100-12.5 mg per tablet Take 1 Tab by mouth daily (with dinner).  21   Fito Reid MD  
atorvastatin (LIPITOR) 40 mg tablet TAKE 1 TABLET EVERY EVENING 20   Fito Reid MD  
leflunomide (ARAVA) 20 mg tablet One daily 20   Rosario Caba MD  
diclofenac (VOLTAREN) 1 % gel Apply 4 g to affected area four (4) times daily. 20   Katelyn Sánchez MD  
nitroglycerin (NITROSTAT) 0.4 mg SL tablet 1 Tab by SubLINGual route every five (5) minutes as needed for Chest Pain. 19   Mallika Friedman MD  
fluticasone (FLONASE) 50 mcg/actuation nasal spray 2 Sprays by Both Nostrils route as needed for Rhinitis. 1/3/19   Mallika Friedman MD  
INFLIXIMAB (REMICADE IV) by IntraVENous route. Every 8 weeks    Provider, Historical  
aspirin delayed-release 81 mg tablet Take 81 mg by mouth nightly. Provider, Historical  
 
 
Allergies Allergen Reactions  Citric Acid Other (comments)  Lisinopril Other (comments) lightheadedness Review of Systems:  A comprehensive review of systems was negative except for that written in the History of Present Illness. Objective:  
 
Visit Vitals /69 (BP 1 Location: Right arm, BP Patient Position: At rest) Pulse 94 Temp (!) 103.1 °F (39.5 °C) Resp 20 Ht 5' 9\" (1.753 m) Wt 76.9 kg (169 lb 8.5 oz) SpO2 96% BMI 25.04 kg/m² Temp (24hrs), Av.9 °F (38.8 °C), Min:99.5 °F (37.5 °C), Max:103.3 °F (39.6 °C) Lines:  Peripheral IV:    
 
Physical Exam:   
General:  Alert, cooperative, well noursished, well developed, appears stated age Eyes:  Sclera anicteric. Pupils equally round and reactive to light. Mouth/Throat: Mucous membranes normal, oral pharynx clear Neck: Supple Lungs:   Clear to auscultation bilaterally, good effort CV:  Regular rate and rhythm,no murmur, click, rub or gallop Abdomen:   Soft, non-tender. bowel sounds normal. non-distended Extremities: No cyanosis or edema Skin: Skin color, texture, turgor normal. no acute rash or lesions Lymph nodes: Cervical and supraclavicular normal  
Musculoskeletal: No swelling or deformity, Decreased ROM L shoulder Lines/Devices:  Intact, no erythema, drainage or tenderness Psych: Alert and oriented, normal mood affect given the setting Data Review: CBC: 
Recent Labs  
  03/13/21 2014 03/13/21 
4385 WBC 13.2* 12.9* GRANS 90* 84* MONOS 6 9 EOS 0* 0* ANEU 11.9* 10.8* ABL 0.4* 0.9 HGB 9.9* 11.1*  
HCT 31.5* 35.6*  
 197 BMP: 
Recent Labs  
  03/14/21 
0646 03/13/21 2014 03/13/21 
0481 CREA 1.22 1.25 1.12  
BUN 28* 28* 26*  137 136*  
K 3.8 3.9 4.3 * 107 103 CO2 22 25 28 AGAP 8 5* 5* * 174* 132* LFTS: 
Recent Labs  
  03/13/21 2014 03/13/21 
7820 TBILI 0.7 0.7 ALT 36 39 * 251* TP 6.7 7.3 ALB 1.8* 2.2* Microbiology:  
 
All Micro Results Procedure Component Value Units Date/Time CULTURE, BLOOD [[id number]] Collected: 03/13/21 2114 Order Status: Completed Specimen: Blood Updated: 03/14/21 1121 Special Requests: --     
  NO SPECIAL REQUESTS 
LEFT Antecubital 
  
  GRAM STAIN    
  GRAM POS COCCI IN CLUSTERS  
     
      
  AEROBIC AND ANAEROBIC BOTTLES  
     
      
  CRITICAL RESULT NOT CALLED DUE TO PREVIOUS NOTIFICATION OF CRITICAL RESULT WITHIN THE LAST 24 HOURS. Culture result:    
  CULTURE IN PROGRESS,FURTHER UPDATES TO FOLLOW  
     
 BLOOD CULTURE ID PANEL [[id number]]  (Abnormal) Collected: 03/13/21 2114 Order Status: Completed Specimen: Blood Updated: 03/14/21 1120 Acc. no. from [employer] [id number] Staphylococcus Detected Staphylococcus aureus Detected Comment: RESULTS VERIFIED, PHONED TO AND READ BACK BY 
Arik Escobar, RN @ 1120 ON 3/14/21 BY AMM 
  
  
  mecA (Methicillin-Resistance Genes) NOT DETECTED INTERPRETATION Gram positive cocci in clusters, identified in realtime PCR as probable MSSA. Comment: Recommend discontinuing IV vancomycin starting cefazolin or nafcillin if patient not on beta-lactam therapy. Infectious Diseases Consult recommended in adult patients. THIS TEST DOES NOT REPLACE SENSITIVITY TESTING.   
  
 CULTURE, BLOOD [[id number]] Collected: 03/13/21 2114  
 Order Status: Completed Specimen: Blood Updated: 03/14/21 1120  
  Special Requests: --     
  NO SPECIAL REQUESTS 
RIGHT 
Antecubital 
  
  GRAM STAIN    
  GRAM POS COCCI IN CLUSTERS  
     
      
  AEROBIC AND ANAEROBIC BOTTLES  
     
      
  RESULTS VERIFIED, PHONED TO AND READ BACK BY BEAU TAM RN @ 1120 ON 3/14/21 BY AMM  
     
  Culture result:    
  CULTURE IN PROGRESS,FURTHER UPDATES TO FOLLOW  
     
      
  Refer to Blood Culture ID Panel Accession 
[id number] 
  
 COVID-19 RAPID TEST [[id number]] Collected: 03/13/21 2357  
 Order Status: Completed Specimen: Nasopharyngeal Updated: 03/14/21 0038  
  Specimen source Nasopharyngeal     
  COVID-19 rapid test Not detected     
  Comment:     
The specimen is NEGATIVE for SARS-CoV-2, the novel coronavirus associated with COVID-19. 
A negative result does not rule out COVID-19. 
    
This test has been authorized by the FDA under an Emergency Use Authorization (EUA) for use by authorized laboratories.  
    
Fact sheet for Healthcare Providers: https://www.fda.gov/media/809073/download 
Fact sheet for Patients: https://www.fda.gov/media/508077/download 
    
Methodology: Isothermal Nucleic Acid Amplification 
  
  
 COVID-19 RAPID TEST [[id number]] Collected: 03/13/21 2357  
 Order Status: Canceled   
  
 
 
Imaging:  
reviewed 
 
Signed By: Hattie Andersen NP   
 March 14, 2021

## 2021-03-14 NOTE — ED TRIAGE NOTES
Pt BIB EMS for fever, AMS. Seen this morning.  Family reports mental status change over the last several.

## 2021-03-15 NOTE — PROGRESS NOTES
Infectious Disease Progress Note Today's Date: 3/15/2021 Admit Date: 3/13/2021 Impression: · MSSA bacteremia (3/13); repeat blood cx pending; TTE pending; source uncertain, does have hardware in back but no other prosthetic joints · Acute nontraumatic L shoulder pain, MRI pending · Fever (103), curve improving · RA; on remicade · Aortic Aneurysm, not repaired per report Plan: · Following repeat blood cultures and TTE · Continue nafcillin but follow Cr closely · Await MRI of shoulder · Anticipating at least 4 week course due to immunosuppressives and no obvious source; may need 6-8 based on imaging Anti-infectives: · IV Nafcillin (3/14- 
· IV Vancomycin (3/14) · IV Rocephin (3/13) · IV Zithromax (3/13-3/14) Subjective: This morning he states he is more oriented, not complaining of pain. Does have pain in shoulder on manipulation. Able to tell us his name and what brought him to the hospital. Report of diarrhea but none this morning. Patient is seen with the MD this morning; assessment and documentation completed concurrently. Allergies Allergen Reactions  Citric Acid Other (comments)  Lisinopril Other (comments) lightheadedness Review of Systems:  A comprehensive review of systems was negative except for that written in the History of Present Illness. Objective:  
 
Visit Vitals /70 (BP 1 Location: Left arm, BP Patient Position: At rest) Pulse 82 Temp 99.7 °F (37.6 °C) Resp 20 Ht 5' 9\" (1.753 m) Wt 76.9 kg (169 lb 8.5 oz) SpO2 94% BMI 25.04 kg/m² Temp (24hrs), Av.3 °F (37.9 °C), Min:98 °F (36.7 °C), Max:103.1 °F (39.5 °C) Lines:  Peripheral IV:    
 
Physical Exam:   
General:  NAD, laying in bed Eyes:  No icterus or drainage Mouth/Throat: Dry MM Neck: Supple Lungs:   CTAB with normal WOB  
CV:  Regular rate and rhythm, 1/6 early systolic murmur heard best at LUSB Abdomen:   Soft/NDNT Extremities: Pitting edema around ankles bilaterally Skin: Few scrapes that don't appear infected, no rash Musculoskeletal: No swollen joints, limited/painful ROM of L shoulder Lines/Devices: Psych: Awake, oriented to person and situation Data Review: CBC: 
Recent Labs  
  03/15/21 
0620 03/13/21 2014 03/13/21 
5360 WBC 17.5* 13.2* 12.9* GRANS 84* 90* 84* MONOS 5 6 9 EOS 0* 0* 0* ANEU 14.7* 11.9* 10.8* ABL 1.0 0.4* 0.9 HGB 10.2* 9.9* 11.1*  
HCT 32.6* 31.5* 35.6*  
* 152 197 BMP: 
Recent Labs  
  03/15/21 
0620 03/14/21 
0646 03/13/21 2014 CREA 1.53* 1.22 1.25  
BUN 41* 28* 28*  141 137  
K 3.9 3.8 3.9 * 111* 107 CO2 25 22 25 AGAP 5* 8 5* * 130* 174* LFTS: 
Recent Labs  
  03/13/21 2014 03/13/21 
2161 TBILI 0.7 0.7 ALT 36 39 * 251* TP 6.7 7.3 ALB 1.8* 2.2* Microbiology:  
 
All Micro Results Procedure Component Value Units Date/Time CULTURE, BLOOD [[id number]]  (Abnormal) Collected: 03/13/21 2114 Order Status: Completed Specimen: Blood Updated: 03/15/21 0063 Special Requests: --     
  LEFT Antecubital 
  
  GRAM STAIN    
  GRAM POS COCCI IN CLUSTERS  
     
      
  AEROBIC AND ANAEROBIC BOTTLES  
     
      
  CRITICAL RESULT NOT CALLED DUE TO PREVIOUS NOTIFICATION OF CRITICAL RESULT WITHIN THE LAST 24 HOURS. Culture result: STAPHYLOCOCCUS AUREUS For Susceptibility Refer to Culture [address] NO. [id number] CULTURE IN PROGRESS,FURTHER UPDATES TO FOLLOW CULTURE, BLOOD [[id number]]  (Abnormal) Collected: 03/13/21 2114 Order Status: Completed Specimen: Blood Updated: 03/15/21 2327 Special Requests: --     
  RIGHT Antecubital 
  
  GRAM STAIN    
  GRAM POS COCCI IN CLUSTERS  
     
      
  AEROBIC AND ANAEROBIC BOTTLES RESULTS VERIFIED, PHONED TO AND READ BACK BY Sharon Benitez RN @ 1120 ON 3/14/21 BY AMM Culture result:   STAPHYLOCOCCUS AUREUS SENSITIVITY TO FOLLOW Refer to Blood Culture ID Panel Accession [id number] CULTURE, BLOOD [[id number]] Collected: 03/15/21 3138 Order Status: Completed Specimen: Blood Updated: 03/15/21 1252 CULTURE, BLOOD [[id number]] Collected: 03/15/21 8206 Order Status: Completed Specimen: Blood Updated: 03/15/21 2541 C. DIFFICILE AG & TOXIN A/B [[id number]] Collected: 03/14/21 2219 Order Status: Completed Specimen: Stool Updated: 03/14/21 4202 BLOOD CULTURE ID PANEL [[id number]]  (Abnormal) Collected: 03/13/21 2114 Order Status: Completed Specimen: Blood Updated: 03/14/21 1120 Acc. no. from [employer] [facility] [id number] Staphylococcus Detected Staphylococcus aureus Detected Comment: RESULTS VERIFIED, PHONED TO AND READ BACK BY 
Kelvin Bailey RN @ 5412 ON 3/14/21 BY AMM 
  
  
  mecA (Methicillin-Resistance Genes) NOT DETECTED INTERPRETATION Gram positive cocci in clusters, identified in realtime PCR as probable MSSA. Comment: Recommend discontinuing IV vancomycin starting cefazolin or nafcillin if patient not on beta-lactam therapy. Infectious Diseases Consult recommended in adult patients. THIS TEST DOES NOT REPLACE SENSITIVITY TESTING. COVID-19 RAPID TEST [[id number]] Collected: 03/13/21 2357 Order Status: Completed Specimen: Nasopharyngeal Updated: 03/14/21 0038 Specimen source Nasopharyngeal     
  COVID-19 rapid test Not detected Comment:     
The specimen is NEGATIVE for SARS-CoV-2, the novel coronavirus associated with COVID-19. A negative result does not rule out COVID-19. This test has been authorized by the FDA under an Emergency Use Authorization (EUA) for use by authorized laboratories. Fact sheet for Healthcare Providers: ConventionUpdate.co.nz Fact sheet for Patients: ConventionUpdate.co.nz Methodology: Isothermal Nucleic Acid Amplification  COVID-19 RAPID TEST [[id number]] Collected: 03/13/21 2357 Order Status: Canceled Imaging:  
reviewed Signed By: Aubrey Arevalo MD   
 March 15, 2021

## 2021-03-15 NOTE — PROGRESS NOTES
Patient resting quietly in bed at this time. Alert and oriented only to self. Respirations even and unlabored on room air. D5 normal saline infusing at 75 mL/hr and [place] 12 g infusing at 41.7 mL/hr without complications. Safety measures in place, call light in reach.   
 
SBAR given to Jeffrey Barboza

## 2021-03-15 NOTE — PROGRESS NOTES
ACUTE OT GOALS: 
(Developed with and agreed upon by patient and/or caregiver.) 1. Pt will toilet with min A 2. Pt will complete functional mobility for ADLs with min A 3. Pt will complete lower body dressing with mod A using AE as needed 4. Pt will complete grooming and hygiene with min A 5. Pt will complete HEP with min cues to promote increased BUE strength and functional use for ADLs 6. Pt will maintain sitting balance for ADLs with min A 7. Pt will complete UB bathing and dressing with CGA 8. Pt will complete bed mobility with min A in prep for ADLs Timeframe: 7 days OCCUPATIONAL THERAPY ASSESSMENT: Initial Assessment and Daily Note OT Treatment Day # 1 Corky Suarez is a 79 y.o. male PRIMARY DIAGNOSIS: Sepsis ([facility] Sepsis ([facility] [A41.9] Acute metabolic encephalopathy [S47.80] Reason for Referral:  Confusion, L shoulder pain ICD-10: Treatment Diagnosis: Generalized Muscle Weakness (M62.81) Other lack of cordination (R27.8) [place]PAT[place] / [place] / Product Type: Medicare /  
ASSESSMENT:  
 
REHAB RECOMMENDATIONS:  
Recommendation to date pending progress: 
[address] [place] [address] vs. Short-term Rehab Equipment:  
 None PRIOR LEVEL OF FUNCTION: 
(Prior to Hospitalization)  INITIAL/CURRENT LEVEL OF FUNCTION: 
(Based on today's evaluation) Bathing: 
 Independent Dressing:  Independent Feeding/Grooming:  Independent Toileting:  Independent Functional Mobility: 
 Independent Bathing:  Maximal Assistance Dressing:  Maximal Assistance Feeding/Grooming:  Maximal Assistance Toileting:  Maximal Assistance Functional Mobility:  Moderate Assistance ASSESSMENT: 
Mr. Bernice Sever presented with deficits in cognition, mobility, balance, and LUE strength, ROM, vision, and coordination impacting ADLs. Pt very confused, able to follow 25% or less simple commands.  Pt often answered questions incorrectly (I.e, stated, \"light twenty\" as a color), unable to determine confusion vs aphasia. LUE in flexor synergy pattern, unable to determine how much was true tone and how much was guarding as pt appeared to have pain with touching and movement of LUE. Pt requires max-total A for all ADLs, min X2 for transfers. Pt is well below his functional baseline and would benefit from skilled OT services to address deficits. SUBJECTIVE:  
Mr. Minor Dailey states that he is thirsty SOCIAL HISTORY/LIVING ENVIRONMENT: Lives with and cares for wife. Unable to provide specifics d/t confusion/ cognitive impairment OBJECTIVE:  
 
PAIN: VITAL SIGNS: LINES/DRAINS:  
Pre Treatment: Pain Screen Pain Scale 1: Numeric (0 - 10) Pain Intensity 1: 0 Post Treatment: 0   IV 
O2 Device: Room air GROSS EVALUATION: 
BUE Within Functional Limits Abnormal/ Functional Abnormal/ Non-Functional (see comments) Not Tested Comments: AROM [] [] [x] [] RUE WFL, LUE impaired at all joints, especially shoulder PROM [] [] [x] [] Limited by flexor tone vs guarding, able to obtain 50% or less Strength [] [x] [] [] Balance [] [] [x] [] Poor sitting balance with posterior lean, fair- to poor standing balance Posture [] [] [] [] Sensation [x] [] [] [] Coordination [] [] [x] [] Tone [] [] [x] [] LUE flexor synergy tone Edema [] [] [] [] Activity Tolerance [] [x] [] []   
 [] [] [] [] COGNITION/ 
PERCEPTION: Intact Impaired  
(see comments) Comments:  
Orientation [] [x] Vision [] [x] Poor vision, unable to read clock or wall board or locate items (I.e, bed rail) to either side. Cues to turn head and visually attend to both sides, especially L Hearing [] [] Slightly  [facility] Judgment/ Insight [] [x] Attention [] [x] Memory [] [x] Command Following [] [x] Emotional Regulation [] []   
 [] [] ACTIVITIES OF DAILY LIVING: I Mod I S SBA CGA Min Mod Max Total NT Comments BASIC ADLs:             
Bathing/ Showering [] [] [] [] [] [] [] [x] [] [] Toileting [] [] [] [] [] [] [] [] [x] [] Dressing [] [] [] [] [] [] [] [x] [x] [] Feeding [] [] [] [] [] [] [] [x] [] []   
Grooming [] [] [] [] [] [] [] [x] [] [] Personal Device Care [] [] [] [] [] [] [] [] [] [] Functional Mobility [] [] [] [] [] [] [x] [x] [] [] I=Independent, Mod I=Modified Independent, S=Supervision, SBA=Standby Assistance, CGA=Contact Guard Assistance,  
Min=Minimal Assistance, Mod=Moderate Assistance, Max=Maximal Assistance, Total=Total Assistance, NT=Not Tested MOBILITY: I Mod I S SBA CGA Min Mod Max Total  NT x2 Comments:  
Supine to sit [] [] [] [] [] [] [] [x] [] [] [] Sit to supine [] [] [] [] [] [] [] [x] [] [] [] Sit to stand [] [] [] [] [] [x] [] [] [] [] [x] Bed to chair [] [] [] [] [] [] [] [] [] [] [] I=Independent, Mod I=Modified Independent, S=Supervision, SBA=Standby Assistance, CGA=Contact Guard Assistance,  
Min=Minimal Assistance, Mod=Moderate Assistance, Max=Maximal Assistance, Total=Total Assistance, NT=Not Tested [facility] Daily Activity Inpatient Short Form How much help from another person does the patient currently need. .. Total A Lot A Little None 1. Putting on and taking off regular lower body clothing? [x] 1   [] 2   [] 3   [] 4  
2. Bathing (including washing, rinsing, drying)? [] 1   [x] 2   [] 3   [] 4  
3. Toileting, which includes using toilet, bedpan or urinal?   [x] 1   [] 2   [] 3   [] 4  
4. Putting on and taking off regular upper body clothing? [] 1   [x] 2   [] 3   [] 4  
5. Taking care of personal grooming such as brushing teeth? [] 1   [x] 2   [] 3   [] 4  
6. Eating meals? [] 1   [x] 2   [] 3   [] 4  
© 2007, Trustees of [facility], under license to [employer]. All rights reserved Score:  Initial: 10 Most Recent: X (Date: -- ) Interpretation of Tool:  Represents activities that are increasingly more difficult (i.e. Bed mobility, Transfers, Gait). PLAN:  
FREQUENCY/DURATION: OT Plan of Care: 3 times/week for duration of hospital stay or until stated goals are met, whichever comes first. 
 
PROBLEM LIST:  
(Skilled intervention is medically necessary to address:) 1. Decreased ADL/Functional Activities 2. Decreased Activity Tolerance 3. Decreased AROM/PROM 4. Decreased Balance 5. Decreased Cognition 6. Decreased Coordination 7. Decreased Strength 8. Decreased Transfer Abilities INTERVENTIONS PLANNED:  
(Benefits and precautions of occupational therapy have been discussed with the patient.) 1. Self Care Training 2. Therapeutic Activity 3. Therapeutic Exercise/HEP 4. Neuromuscular Re-education 5. Education TREATMENT:  
 
EVALUATION: High Complexity : (Untimed Charge) TREATMENT:  
($$ Self Care/Home Management: 23-37 mins    ) Co-Treatment PT/OT necessary due to patient's decreased overall endurance/tolerance levels, as well as need for high level skilled assistance to complete functional transfers/mobility and functional tasks Self Care (23 Minutes): Self care including Lower Body Bathing, Toileting, Lower Body Dressing and Grooming to increase independence and decrease level of assistance required. AFTER TREATMENT POSITION/PRECAUTIONS: 
Alarm Activated, Bed, Needs within reach and RN notified INTERDISCIPLINARY COLLABORATION: 
RN/PCT and PT/PTA TOTAL TREATMENT DURATION: 
OT Patient Time In/Time Out Time In: 1054 Time Out: 1128 Deepak Valentine OT

## 2021-03-15 NOTE — PROGRESS NOTES
SBAR received from [address]. Patient resting quietly in bed. Alert and oriented only to self. Pt unable to track with eyes. Respirations even and unlabored on room air. Complete bed bath completed at this time d/t large loose, watery BM at this time. Pt unable to tolerate being turned onto left side secondary to shoulder pain. D5 normal saline infusing at 75 ml/hr and [place] 12 g infusing at 41.7 mL/hr without complications. Safety measures in place, call light in reach.

## 2021-03-15 NOTE — PROGRESS NOTES
Alert  Oriented to self only  Confused to place and situation  Denies pain  Previously complained about shoulder pain  Room air  Hungry  Iv site without redness or edema  Incontinent of urine

## 2021-03-15 NOTE — PROGRESS NOTES
Patient in bed resting quietly with lights dim. No signs of distress. Safety measures in place. Will continue to monitor.

## 2021-03-15 NOTE — PROGRESS NOTES
Care Management Interventions PCP Verified by CM: Yes Physical Therapy Consult: Yes Occupational Therapy Consult: Yes Current Support Network: Lives with Spouse Confirm Follow Up Transport: Family Freedom of Choice List was Provided with Basic Dialogue that Supports the Patient's Individualized Plan of Care/Goals, Treatment Preferences and Shares the Quality Data Associated with the Providers?: Yes [employer] Information Provided?: No 
Discharge Location Discharge Placement: Unable to determine at this time CM called patient's daughter to discuss discharge plans. Daughter is the POA and will bring paperwork to the hospital. 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 Daughter tells CM that patient's health is declining due to caring for his spouse. Daughter is currently trying to get respite for wife. Patient is independent with ambulation and ADLs. No history of HH or rehab. PT/OT/SLP consulted. CM following.

## 2021-03-15 NOTE — PROGRESS NOTES
Unable to collect morning labs from patient. Hard stick form sent to lab via tube system at this time.

## 2021-03-15 NOTE — PROGRESS NOTES
Patient found with 18G left forearm IV out and lying in bed. Catheter tip intact. Pt stated he pulled it out because \"it was itching me. \" Patient alert and oriented only to self and believes he is in [place], [place] with the president. IVF currently infusing to R AC IV without complications. Safety measures in place, call light in reach.

## 2021-03-15 NOTE — PROGRESS NOTES
ACUTE PHYSICAL THERAPY GOALS: 
(Developed with and agreed upon by patient and/or caregiver.) STG: 
(1.)Mr. Cornell Bobby will move from supine to sit and sit to supine , scoot up and down and roll side to side with MODERATE ASSIST within 3 treatment day(s). (2.)Mr. Cornell Bobby will transfer from bed to chair and chair to bed with MINIMAL ASSIST of 1 using the least restrictive device within 3 treatment day(s). (3.)Mr. Cornell Bobby will ambulate with MINIMAL ASSIST for 25+ feet with the least restrictive device within 3 treatment day(s). LTG: 
(1.)Mr. Cornell Bobby will move from supine to sit and sit to supine , scoot up and down and roll side to side in bed with CG to MINIMAL ASSIST within 7 treatment day(s). (2.)Mr. Cornell Bobby will transfer from bed to chair and chair to bed with CONTACT GUARD ASSIST using the least restrictive device within 7 treatment day(s). (3.)Mr. Cornell Bobby will ambulate with CONTACT GUARD ASSIST for 150+ feet with the least restrictive device within 7 treatment day(s). ________________________________________________________________________________________________ PHYSICAL THERAPY ASSESSMENT: Initial Assessment and Daily Note PT Treatment Day # 1 Ariel Yates is a 79 y.o. male PRIMARY DIAGNOSIS: Sepsis ([facility] [address] Sepsis ([facility] [address] [A41.9] Acute metabolic encephalopathy [Z86.35] Reason for Referral: ICD-10: Treatment Diagnosis: Other abnormalities of gait and mobility (R26.89) [place] / Product Type: Medicare /  
 
ASSESSMENT:  
 
REHAB RECOMMENDATIONS:  
Recommendation to date pending progress: 
[address] [place] [address] vs. Short-term Rehab Equipment:  To Be Determined PRIOR LEVEL OF FUNCTION: 
(Prior to Hospitalization) INITIAL/CURRENT LEVEL OF FUNCTION: 
(Most Recently Demonstrated) Bed Mobility: 
 Independent Sit to Stand:  Independent Transfers:  Independent Gait/Mobility: 
 Independent Bed Mobility:  Maximal Assistance x 2 Sit to Stand:  Minimal Assistance x 2 Transfers:  Minimal Assistance x 2 Gait/Mobility:  Minimal Assistance x 2  
 
ASSESSMENT: 
Mr. Cindy Hoyos lives with his wife who has dementia, whom he cares for. He is independent. Today he is confused, oriented to self and place and month only. He has decreased attention/concentration and decreased command following. B LE strength appears WFL, however, he keeps L UE in flexor synergy pattern. Patient seems to require less assistance when he moves voluntarily. Patient has declined in functional mobility. Mr. Cindy Hoyos would benefit from skilled physical therapy (medically necessary) to address his deficits and maximize his function. .  
 
SUBJECTIVE:  
Mr. Cindy Hoyos states, \"It's light twenty,\" when asked what color scrub top is. SOCIAL HISTORY/LIVING ENVIRONMENT: Mr. Cindy Hoyos lives with his wife who has dementia, whom he cares for. He is independent. OBJECTIVE:  
 
PAIN: VITAL SIGNS: LINES/DRAINS:  
Pre Treatment: Pain Screen Pain Scale 1: Numeric (0 - 10) Pain Intensity 1: 0 Post Treatment: 0   IV 
O2 Device: Room air GROSS EVALUATION: 
B LE's Within Functional Limits Abnormal/ Functional Abnormal/ Non-Functional (see comments) Not Tested Comments: AROM [] [x] [] [] PROM [] [] [] [] Strength [] [x] [] [] Balance [] [] [x] [] Poor sitting balance, fair to poor standing balance. Posture [] [x] [] [] Sensation [] [] [] [] Unable to assess due to cognition Coordination [] [] [] [] Tone [x] [] [] [] Edema [x] [] [] [] Activity Tolerance [] [x] [] []   
 [] [] [] [] COGNITION/ 
PERCEPTION: Intact Impaired  
(see comments) Comments:  
Orientation [] [x] Vision [] [x] Hearing [] [] Command Following [] [x] Safety Awareness [] [x] Due to confusion/cognition  
 [] [] MOBILITY: I Mod I S SBA CGA Min Mod Max Total  NT x2 Comments:  
Bed Mobility Rolling [] [] [] [] [] [] [] [] [] [] [] Supine to Sit [] [] [] [] [] [] [x] [x] [] [] [x] Scooting [] [] [] [] [] [] [x] [] [] [] [x] Sit to Supine [] [] [] [] [] [] [x] [x] [] [] [x] Transfers Sit to Stand [] [] [] [] [] [x] [] [] [] [] [x] Bed to Chair [] [] [] [] [] [] [] [] [] [x] [] Stand to Sit [] [] [] [] [] [x] [] [] [] [] [x] I=Independent, Mod I=Modified Independent, S=Supervision, SBA=Standby Assistance, CGA=Contact Guard Assistance,  
Min=Minimal Assistance, Mod=Moderate Assistance, Max=Maximal Assistance, Total=Total Assistance, NT=Not Tested GAIT: I Mod I S SBA CGA Min Mod Max Total  NT x2 Comments:  
Level of Assistance [] [] [] [] [] [x] [] [] [] [] [x] Distance 2' sidestepping at [facility]   
DME Gait Belt and HHA Gait Quality Flexed trunk, slow, short steps Weightbearing Status N/A I=Independent, Mod I=Modified Independent, S=Supervision, SBA=Standby Assistance, CGA=Contact Guard Assistance,  
Min=Minimal Assistance, Mod=Moderate Assistance, Max=Maximal Assistance, Total=Total Assistance, NT=Not Tested [facility] Mobility Inpatient Short Form How much difficulty does the patient currently have. .. Unable A Lot A Little None 1. Turning over in bed (including adjusting bedclothes, sheets and blankets)? [] 1   [x] 2   [] 3   [] 4  
2. Sitting down on and standing up from a chair with arms ( e.g., wheelchair, bedside commode, etc.)   [] 1   [] 2   [x] 3   [] 4  
3. Moving from lying on back to sitting on the side of the bed? [] 1   [x] 2   [] 3   [] 4 How much help from another person does the patient currently need. .. Total A Lot A Little None 4. Moving to and from a bed to a chair (including a wheelchair)? [] 1   [] 2   [x] 3   [] 4  
5. Need to walk in hospital room? [] 1   [] 2   [x] 3   [] 4  
6. Climbing 3-5 steps with a railing? [] 1   [x] 2   [] 3   [] 4  
© 2007, Trustees of [address] [place] [address] [address] [address], under license to [facility].  All rights reserved Score:  Initial: 15 Most Recent: X (Date: -- ) Interpretation of Tool:  Represents activities that are increasingly more difficult (i.e. Bed mobility, Transfers, Gait). PLAN:  
FREQUENCY/DURATION: PT Plan of Care: 3 times/week for duration of hospital stay or until stated goals are met, whichever comes first. 
 
PROBLEM LIST:  
(Skilled intervention is medically necessary to address:) 1. Decreased Activity Tolerance 2. Decreased Balance 3. Decreased Cognition 4. Decreased Coordination 5. Decreased Gait Ability 6. Decreased Strength 7. Decreased Transfer Abilities INTERVENTIONS PLANNED:  
(Benefits and precautions of physical therapy have been discussed with the patient.) 1. Therapeutic Activity 2. Therapeutic Exercise/HEP 3. Neuromuscular Re-education 4. Gait Training 5. Manual Therapy 6. Education TREATMENT:  
 
EVALUATION: Moderate Complexity : (Untimed Charge) TREATMENT:  
($$ Neuromuscular Re-education: 23-37 mins    ) Co-Treatment PT/OT necessary due to patient's decreased overall endurance/tolerance levels, as well as need for high level skilled assistance to complete functional transfers/mobility and functional tasks Neuromuscular Re-education (25 Minutes): Neuromuscular Re-education included Balance Training, Functional mobility with facilitation, Postural training, Sitting balance training and Standing balance training to improve Balance, Functional Mobility and Postural Control. TREATMENT GRID: 
N/A 
 
AFTER TREATMENT POSITION/PRECAUTIONS: 
Alarm Activated, Bed, Needs within reach and RN notified INTERDISCIPLINARY COLLABORATION: 
RN/PCT, PT/PTA and OT/BELL TOTAL TREATMENT DURATION: 
PT Patient Time In/Time Out Time In: 0753 Time Out: 1130 A Cecilia Kirkland PT, DPT

## 2021-03-15 NOTE — PROGRESS NOTES
Iv infiltrated right arm  Unsuccessful attempt at starting new site  Vascular access team called to assist  Patient remains confused  Oriented to self only no diarrhea today  Covid results pending

## 2021-03-15 NOTE — PROGRESS NOTES
Hospitalist Progress Note 3/15/2021 Admit Date: 3/13/2021  8:05 PM  
NAME: Ce Ramirez :  1940 MRN:  [id number] Attending: Andres Person DO 
PCP:  Terra Wilkins MD 
 
SUBJECTIVE:  
Patient is a 79 y.o. male with medical history of RA on bi-weekly infusion, CAD, idiopathic thrombocytopenia, hypothyroidism, AAA and HTN presented with daughter at bedside as patient is confused cannot give any information. According to daughter, pt started feeling bad since the second Covid vaccine late February. On 3/10, he was complaining of left shoulder pain associated with confusion and mumbling speech and was taken to . Daughter was concerned that he might be having a stroke. However he was diagnosed with herpes zoster and was sent home. He reportedly had an x-ray done at  of left shoulder with no fracture noted. 
 Patient's symptoms persisted with decreased p.o. intake with associated 5 lbs weight loss. He developed a fever morning of 3/13/2021, daughter gave him a dose of Tylenol and brought pt to the ER and was discharged home. Daughter noticed that patient was having chills and again having T103, hence brought patient back to the emergency room. Patient at baseline was independent with ADL's and actually can care of his wife who has dementia. On admission, patient was only mumbling with difficult to understand speech but did complain of pain on moving the LUE which was in flexed position. He was able to move all other extremities on his own except for left upper extremity but did not really follow verbal commands. ED workup includes WBC 13.2, hemoglobin 9.9, creatinine 1.25, GFR of 59. Rapid Covid test negative. CT head no acute findings. Chest x-ray shows low lung volumes with basilar consolidation. Pt was given Ceftriaxone and Azithromycin in ED. He is admitted for acute metabolic encephalopathy, sepsis secondary to pneumonia. Blood cultures grew MSSA. ID has been consulted.  
 
Pt did much better this AM. Was alert and oriented x3, though still with confused speech. Able to answer yes or no appropriately and follows basic commands. Unable to obtain MRI last night d/t agitation. SLP recommended pureed diet. He has been afebrile since midnight last night. He denies chest pain or abdominal pain or SOB. Review of Systems: Unable to assess d/t pt's condition PHYSICAL EXAM  
 
Visit Vitals /82 (BP 1 Location: Left arm, BP Patient Position: At rest) Pulse 81 Temp 98.5 °F (36.9 °C) Resp 20 Ht 5' 9\" (1.753 m) Wt 76.9 kg (169 lb 8.5 oz) SpO2 93% BMI 25.04 kg/m² Temp (24hrs), Av.1 °F (37.3 °C), Min:98 °F (36.7 °C), Max:102.2 °F (39 °C) Oxygen Therapy O2 Sat (%): 93 % (03/15/21 1459) Pulse via Oximetry: 104 beats per minute (21 0001) O2 Device: Room air (03/15/21 1408) Intake/Output Summary (Last 24 hours) at 3/15/2021 1711 Last data filed at 3/15/2021 1425 Gross per 24 hour Intake 240 ml Output  Net 240 ml General: No acute distress. Alert and oriented x3, though still with confused speech. Able to answer yes or no appropriately and follows basic commands. Lungs:  Rhonchi on b/l lung bases Heart:  tachycardic rate and regular rhythm,  No murmur, rub, or gallop Abdomen: Soft, Non distended, Non tender, Positive bowel sounds Extremities: No cyanosis, clubbing or edema. Scabbed, erythematous multiple small lesions on L shoulder, no vesicles appreciated. Neurologic:  Still with confused speech. No focal deficits. XR SHOULDER LT AP/LAT MIN 2 V Final Result Mild degenerative change. No acute findings. CT HEAD WO CONT Final Result 1. No CT evidence of acute intracranial process. XR CHEST PORT Final Result Low lung volumes with basilar consolidation. MRI BRAIN WO CONT    (Results Pending) MRI SHOULDER LT W CONT    (Results Pending) XR SWALLOW FUNC VIDEO    (Results Pending) ASSESSMENT Active Hospital Problems Diagnosis Date Noted  Sepsis ([facility] 03/14/2021  Acute metabolic encephalopathy 44/87/0857  PNA (pneumonia) 03/14/2021  Suspected COVID-19 virus infection 03/14/2021  Bacteremia 03/14/2021  AAA (abdominal aortic aneurysm) ([facility] 02/06/2013 Followed by Vascular Surgery  Rheumatoid arthritis ([facility] 08/01/2012  Hypothyroidism 08/01/2012  Mixed hyperlipidemia 08/01/2012  Chronic idiopathic thrombocytopenia ([facility] 08/01/2012 S/P W/U Dr Ward Antis Plan: 
 
Sepsis Bacteremia Meets sepsis criteria with HR>100, Tmax >100.4, WBC>12K. UA negative for UTI. CXR shows consolidation in L and R lungs bases. Most likely secondary to pneumonia vs ? meningitis with recent headaches in the past several days. Recent diagnosis of herpes zoster and finished second covid vaccine a few weeks ago. Rapid Covid negative. PCR pending. Abx: Vanc/Zosyn (3/14-3/14) (as immunosuppressive)-- changed to Nafcillin (3/14-. ..) per ID 
BCx 3/13: grew MSSA Obtain TTE--pending Consult ID--Cont with Nafcillin, f/u BCx and TTE, MRI shoulder, anticipate 4 weeks abx course d/t immunosuppressives w/o obvious source, may need 6-8 based on imaging F/u BCx, covid PCR, TTE, ID recs Acute metabolic encephalopathy/delirium, improved -?etiology, ? Meningitis vs AMS 2/2 sepsis per above. Recent zoster. UA negative for UTI. CT head no acute findings. Mumbles, difficult to understand, does not follow commands Avoid sedating meds Fall precautions Delirium precautions PT/OT 
SLP to eval and treat--MBS pending. Pureed diet Infection management as stated Vit B12, folate, ammonia, RPR, HIV, TSH/Free T4--unremarkable MRI brain pending Left shoulder pain Had zoster over the posterior aspect of the left shoulder region few days ago. 
-ordered x-ray--no acute findings, only degenerative changes. -MRI shoulder pending Poor p.o. intake 
-as per daughter Lost about 5 pounds unintentionally.   
-Nutrition consult. -mIVF: D5 w/ normal saline 
-SLP to assist--MBS pending, ok for pureed diet for now Rheumatoid arthritis Holding Leflunomide and Remicade Coronary disease:  
Cont home ASA, statin, ARB Hypothyroidism Cont home synthroid Abdominal aortic aneurysm Cont home statin History of idiopathic thrombocytopenia Monitor CBC Hypertension DC hydrochlorothiazide. Continue losartan DVT Prophylaxis: Lovenox SQ Dispo: Pending improvement. PT/OT ordered. Signed By: Kasey Rivera DO March 15, 2021

## 2021-03-15 NOTE — PROGRESS NOTES
LTG: Patient will tolerate least restrictive diet without overt signs or symptoms of airway compromise. STG: Patient will tolerate pureed diet and thin liquids without overt signs or symptoms of airway compromise. STG: Patient will participate in modified barium swallow study as clinically indicated. SPEECH LANGUAGE PATHOLOGY: DYSPHAGIA- Initial Assessment NAME/AGE/GENDER: Yary Dean is a 79 y.o. male DATE: 3/15/2021 PRIMARY DIAGNOSIS: Sepsis ([facility] [address] [A41.9] Acute metabolic encephalopathy [C07.65] ICD-10: Treatment Diagnosis: R13.12 Dysphagia, Oropharyngeal Phase RECOMMENDATIONS  
DIET:  
 Puree  Thin Liquids MEDICATIONS: Crushed in puree ASPIRATION PRECAUTIONS · Slow rate of intake · Small bites/sips · Upright at 90 degrees during meal 
  
COMPENSATORY STRATEGIES/MODIFICATIONS · Small sips and bites · 1:1 assistance with all intake for aspiration precautions RECOMMENDATIONS for CONTINUED SPEECH THERAPY:  
YES: Anticipate need for ongoing speech therapy during this hospitalization and at next level of care. ASSESSMENT Patient presents with confusion increasing risk for dysphagia; however overall, pharyngeal swallow appears functional. There was single episode of overt s/sx airway compromise with serial gulps of thin liquid. Otherwise, pt tolerated ~10oz thin liquid via serial straw gulps without overt s/sx airway compromise. Frequent belching. Chewables deferred due to limited dentition and mentation, but able to tolerate puree without difficulty. Recommend pureed diet and thin liquids. 1:1 assistance for aspiration precaution. Will plan for modified barium swallow study 3/16 if patient off isolation precautions to objectively assess and rule out silent aspiration.   
 
 
EDUCATION: 
· Recommendations discussed with Patient, MD and RN 
CONTINUATION OF SKILLED SERVICES/MEDICAL NECESSITY: 
 Patient is expected to demonstrate progress in  swallow strength, swallow timeliness, swallow function, diet tolerance and swallow safety in order to  improve swallow safety, work toward diet advancement and decrease aspiration risk. 
• Patient continues to require skilled intervention due to dysphagia.  
REHABILITATION POTENTIAL FOR STATED GOALS: Excellent 
 
PLAN   
FREQUENCY/DURATION: Continue to follow patient 3 times a week for duration of hospital stay to address above goals. 
 
- Recommendations for next treatment session: Next treatment session will address Modified Barium Swallow Study 
 
SUBJECTIVE  
Confused but alert and pleasant. Thinks he is at home. \"go look in the fridge for orange juice\" 
 
Oxygen Device: room air 
Pain: Pain Scale 1: Numeric (0 - 10) 
Pain Intensity 1: 0 
 
History of Present Injury/Illness: Mr. 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 He also  has a past surgical history that includes hx lumbar fusion ([id number]); hx colonoscopy (Dec 2003); hx heart catheterization (2001 and 2003); pr left heart cath,percutaneous (2014); hx cataract removal; hx tonsillectomy; pr sinus surgery proc unlisted; hx heent; hx orthopaedic; hx hernia repair; and hx other surgical (08/2017). PRECAUTIONS/ALLERGIES:  Citric acid and Lisinopril Problem List:  (Impairments causing functional limitations): 1. Oropharyngeal dysphagia Previous Dysphagia: per chart, on regular diet at home, but reduced intakeprior to admission. Diet Prior to Evaluation: NPO awaiting SLP evaluation Orientation: 
Person Cognitive-Linguistic Screening:  Alertness 
o Alert  Speech Production:  
o Some dysarthria, but able to understand  Expressive Language: 
o confused but able to express basic wants/needs (\"I told you I was hungry\")  Receptive Language: 
o follows basic commands with prompting  Cognition:  
o Impaired; encephalopathy. Prior Level of Function: lives with wife. Cares for demented wife. Recommendations: Given results of screening, patient functioning below baseline due to acute illness/encephalopathy. OBJECTIVE Oral Motor:  
· Labial: slight left weakness · Dentition: Limited and Poor (reports has partials) · Oral Hygiene: thick phlegm removed from posterior oral cavity · Lingual: Decreased strength Dysphagia evaluation: 
 Patient consumed trials of ice chips, thin liquids by cup and straw, and puree. delayed throat clear and cough after serial gulps of thin on 1 occasion at start. Tolerated an additional ~10oz of thin liquid by serial gulp without overt s/sx airway compromise and Vocal quality remained clear. frequently belches. Adequate prep/clearance with puree. Chewables deferred due to AMS and poor/limited dentition. Tool Used: Dysphagia Outcome and Severity Scale (VICTOR M) Score Comments Normal Diet  [] 7 With no strategies or extra time needed Functional Swallow  [] 6 May have mild oral or pharyngeal delay Mild Dysphagia  [] 5 Which may require one diet consistency restricted Mild-Moderate Dysphagia  [] 4 With 1-2 diet consistencies restricted Moderate Dysphagia  [] 3 With 2 or more diet consistencies restricted Moderate-Severe Dysphagia  [] 2 With partial PO strategies (trials with ST only) Severe Dysphagia  [] 1 With inability to tolerate any PO safely Score:  Initial: 4 Most Recent: x (Date 03/15/21 ) Interpretation of Tool: The Dysphagia Outcome and Severity Scale (VICTOR M) is a simple, easy-to-use, 7-point scale developed to systematically rate the functional severity of dysphagia based on objective assessment and make recommendations for diet level, independence level, and type of nutrition. Current Medications: No current facility-administered medications on file prior to encounter. Current Outpatient Medications on File Prior to Encounter Medication Sig Dispense Refill  traMADoL (Ultram) 50 mg tablet Take 1 Tab by mouth every eight (8) hours as needed for Pain for up to 3 days. Max Daily Amount: 150 mg. 11 Tab 0  
 levothyroxine (SYNTHROID) 100 mcg tablet Take 1 Tab by mouth Daily (before breakfast). 90 Tab 1  
 losartan-hydroCHLOROthiazide (HYZAAR) 100-12.5 mg per tablet Take 1 Tab by mouth daily (with dinner). 90 Tab 1  
 atorvastatin (LIPITOR) 40 mg tablet TAKE 1 TABLET EVERY EVENING 90 Tab 1  
 leflunomide (ARAVA) 20 mg tablet One daily 30 Tab 2  
 diclofenac (VOLTAREN) 1 % gel Apply 4 g to affected area four (4) times daily. 100 g 4  
 nitroglycerin (NITROSTAT) 0.4 mg SL tablet 1 Tab by SubLINGual route every five (5) minutes as needed for Chest Pain. 1 Bottle 3  
 fluticasone (FLONASE) 50 mcg/actuation nasal spray 2 Sprays by Both Nostrils route as needed for Rhinitis. 1 Bottle 3  
 INFLIXIMAB (REMICADE IV) by IntraVENous route. Every 8 weeks  aspirin delayed-release 81 mg tablet Take 81 mg by mouth nightly. INTERDISCIPLINARY COLLABORATION: RN and MD 
 
After treatment position/precautions: · Upright in bed · RN notified · MD going in room to evaluate patient Total Treatment Duration:  
Time In: 1647 Time Out: 0336 [address] CCC-SLP

## 2021-03-16 NOTE — PROGRESS NOTES
Patient in bed resting quietly. Respirations even and unlabored on room air. IV patent and infusing. No needs expressed at this time. Safety measures in place.

## 2021-03-16 NOTE — PROGRESS NOTES
Upon assessment patient is alert only to self; this RN did not feel as though patient was oriented enough to swallow PO medication. This RN notified Dr. Isis Bragg of the held meds.

## 2021-03-16 NOTE — PROGRESS NOTES
Pt has slept all the afternoon. Pt will arouse with facial grimace and then goes back to sleep. No s/sx of distress noted at this time. Call light in reach, will monitor.

## 2021-03-16 NOTE — PROGRESS NOTES
Infectious Disease Progress Note Today's Date: 3/16/2021 Admit Date: 3/13/2021 Impression: · MSSA bacteremia (3/13, 3/15); repeat blood cx pending; TTE pending; source uncertain, does have hardware in back but no other prosthetic joints · Acute nontraumatic L shoulder pain, MRI pending · Fever (103), curve improving · RA; on remicade · MING, Cr rise noted. · Aortic Aneurysm, not repaired per report Plan: · Cr rise noted, follow closely · Unsure why TTE has not yet been done? · Repeat BC today · Cancel MRI shoulder for now-if his mental status improves, can re-attempt · Continue Nafcillin continuous infusion · Anticipate at least 4 weeks sec to immunosuppression Anti-infectives: · IV Nafcillin (3/14- 
· IV Vancomycin (3/14) · IV Rocephin (3/13) · IV Zithromax (3/13-3/14) Subjective:  
Agitated last evening, unable to have MRI brain. Left shoulder pain somewhat improved. Allergies Allergen Reactions  Citric Acid Other (comments)  Lisinopril Other (comments) lightheadedness Review of Systems:  A comprehensive review of systems was negative except for that written in the History of Present Illness. Objective:  
 
Visit Vitals /83 (BP 1 Location: Left arm, BP Patient Position: At rest) Pulse 79 Temp 98.2 °F (36.8 °C) Resp 19 Ht 5' 9\" (1.753 m) Wt 76.6 kg (168 lb 14 oz) SpO2 95% BMI 24.94 kg/m² Temp (24hrs), Av.5 °F (36.9 °C), Min:97.9 °F (36.6 °C), Max:99.4 °F (37.4 °C) Lines:  Peripheral IV:   Intact Physical Exam:   
General:  NAD, laying in bed Eyes:  No icterus or drainage Mouth/Throat: Dry MM Neck: Supple Lungs:   CTAB with normal WOB  
CV:  Regular rate and rhythm, 1/6 early systolic murmur heard best at LUSB Abdomen:   Soft/NDNT Extremities: Pitting edema around ankles bilaterally Skin: No rash, healed scabs Musculoskeletal: No swollen joints, limited/painful ROM of L shoulder, but improved since yesterday Psych: Awake, oriented to person and situation Data Review: CBC: 
Recent Labs  
  03/16/21 
0610 03/15/21 
0620 03/13/21 
2014 WBC 11.9* 17.5* 13.2*  
GRANS 81* 84* 90* MONOS 7 5 6 EOS 0* 0* 0* ANEU 9.7* 14.7* 11.9* ABL 1.2 1.0 0.4* HGB 9.4* 10.2* 9.9*  
HCT 29.6* 32.6* 31.5* * 141* 152 BMP: 
Recent Labs  
  03/16/21 
0610 03/15/21 
0620 03/14/21 
6110 CREA 1.63* 1.53* 1.22  
BUN 50* 41* 28*  142 141  
K 4.1 3.9 3.8 * 112* 111* CO2 23 25 22 AGAP 6* 5* 8 * 114* 130* LFTS: 
Recent Labs  
  03/13/21 2014 03/13/21 
7331 TBILI 0.7 0.7 ALT 36 39 * 251* TP 6.7 7.3 ALB 1.8* 2.2* Microbiology:  
 
All Micro Results Procedure Component Value Units Date/Time CULTURE, BLOOD [[id number]]  (Abnormal) Collected: 03/13/21 2114 Order Status: Completed Specimen: Blood Updated: 03/16/21 0557 Special Requests: --     
  LEFT Antecubital 
  
  GRAM STAIN    
  GRAM POS COCCI IN CLUSTERS  
     
      
  AEROBIC AND ANAEROBIC BOTTLES  
     
      
  CRITICAL RESULT NOT CALLED DUE TO PREVIOUS NOTIFICATION OF CRITICAL RESULT WITHIN THE LAST 24 HOURS. Culture result: STAPHYLOCOCCUS AUREUS For Susceptibility Refer to Culture [address] [place] [address] Drive NO. [id number] CULTURE, BLOOD [[id number]]  (Abnormal)  (Susceptibility) Collected: 03/13/21 2114 Order Status: Completed Specimen: Blood Updated: 03/16/21 0557 Special Requests: --     
  RIGHT Antecubital 
  
  GRAM STAIN    
  GRAM POS COCCI IN CLUSTERS  
     
      
  AEROBIC AND ANAEROBIC BOTTLES RESULTS VERIFIED, PHONED TO AND READ BACK BY Gi Ott RN @ 7687 ON 3/14/21 BY AMM Culture result: STAPHYLOCOCCUS AUREUS Refer to Blood Culture ID Panel Accession [id number] CULTURE, BLOOD [[id number]] Collected: 03/15/21 7210 Order Status: Completed Specimen: Blood Updated: 03/16/21 0510   Special Requests: -- LEFT 
ARM 
  
  GRAM STAIN AEROBIC BOTTLE POSITIVE     
      
  GRAM POS COCCI IN CLUSTERS RESULTS VERIFIED, PHONED TO AND READ BACK BY Vaishnavi Corona RN [id number] ON [id number] [place] Culture result:    
  CULTURE IN PROGRESS,FURTHER UPDATES TO FOLLOW CULTURE, BLOOD [[id number]] Collected: 03/15/21 5366 Order Status: Completed Specimen: Blood Updated: 03/16/21 0043 Special Requests: --     
  LEFT 
HAND 
  
  GRAM STAIN AEROBIC BOTTLE POSITIVE     
      
  GRAM POS COCCI IN CLUSTERS RESULTS VERIFIED, PHONED TO AND READ BACK BY Vaishnavi Corona RN [id number] ON [id number] [place] Culture result:    
  CULTURE IN PROGRESS,FURTHER UPDATES TO FOLLOW C. DIFFICILE AG & TOXIN A/B [[id number]] Collected: 03/14/21 2219 Order Status: Completed Specimen: Stool Updated: 03/15/21 1303 [address] [place] ANTIGEN    
  C. DIFFICILE GDH ANTIGEN-NEGATIVE  
     
  C. difficile toxin C. DIFFICILE TOXIN-NEGATIVE  
     
  PCR Reflex NOT APPLICABLE INTERPRETATION    
  NEGATIVE FOR TOXIGENIC C. DIFFICILE Clinical Consideration NEGATIVE FOR TOXIGENIC C. DIFFICILE  
     
 BLOOD CULTURE ID PANEL [[id number]]  (Abnormal) Collected: 03/13/21 2114 Order Status: Completed Specimen: Blood Updated: 03/14/21 1120 Acc. no. from [employer] [id number] Staphylococcus Detected Staphylococcus aureus Detected Comment: RESULTS VERIFIED, PHONED TO AND READ BACK BY 
Steve Ortiz, MATTHEW @ 1120 ON 3/14/21 BY AMM 
  
  
  mecA (Methicillin-Resistance Genes) NOT DETECTED INTERPRETATION Gram positive cocci in clusters, identified in realtime PCR as probable MSSA. Comment: Recommend discontinuing IV vancomycin starting cefazolin or nafcillin if patient not on beta-lactam therapy. Infectious Diseases Consult recommended in adult patients. THIS TEST DOES NOT REPLACE SENSITIVITY TESTING. COVID-19 RAPID TEST [[id number]] Collected: 03/13/21 9169 Order Status: Completed Specimen: Nasopharyngeal Updated: 03/14/21 0038 Specimen source Nasopharyngeal     
  COVID-19 rapid test Not detected Comment:     
The specimen is NEGATIVE for SARS-CoV-2, the novel coronavirus associated with COVID-19. A negative result does not rule out COVID-19. This test has been authorized by the FDA under an Emergency Use Authorization (EUA) for use by authorized laboratories. Fact sheet for Healthcare Providers: ConventionUpdate.co.nz Fact sheet for Patients: ConventionUpdate.co.nz Methodology: Isothermal Nucleic Acid Amplification COVID-19 RAPID TEST [[id number]] Collected: 03/13/21 2357 Order Status: Canceled Imaging:  
reviewed Signed By: Sy Arriaza NP March 16, 2021

## 2021-03-16 NOTE — PROGRESS NOTES
Pt resting in bed. Pt alert oriented to person at this time. Pt denies pain or distress at this time.  SCDs in place. Pt encouraged to call for assistance if needed call light in reach, will monitor.

## 2021-03-16 NOTE — PROGRESS NOTES
Hospitalist Progress Note 3/16/2021 Admit Date: 3/13/2021  8:05 PM  
NAME: Onel Goddard :  1940 MRN:  [id number] Attending: Antonina Hdez DO 
PCP:  Helen Floyd MD 
 
SUBJECTIVE:  
Patient is a 79 y.o. male with medical history of RA on bi-weekly infusion, CAD, idiopathic thrombocytopenia, hypothyroidism, AAA and HTN presented with daughter at bedside as patient is confused cannot give any information. According to daughter, pt started feeling bad since the second Covid vaccine late February. On 3/10, he was complaining of left shoulder pain associated with confusion and mumbling speech and was taken to . Daughter was concerned that he might be having a stroke. However he was diagnosed with herpes zoster and was sent home. He reportedly had an x-ray done at  of left shoulder with no fracture noted. 
 Patient's symptoms persisted with decreased p.o. intake with associated 5 lbs weight loss. He developed a fever morning of 3/13/2021, daughter gave him a dose of Tylenol and brought pt to the ER and was discharged home. Daughter noticed that patient was having chills and again having T103, hence brought patient back to the emergency room. Patient at baseline was independent with ADL's and actually can care of his wife who has dementia. On admission, patient was only mumbling with difficult to understand speech but did complain of pain on moving the LUE which was in flexed position. He was able to move all other extremities on his own except for left upper extremity but did not really follow verbal commands. ED workup includes WBC 13.2, hemoglobin 9.9, creatinine 1.25, GFR of 59. Rapid Covid test negative. CT head no acute findings. Chest x-ray shows low lung volumes with basilar consolidation. Pt was given Ceftriaxone and Azithromycin in ED. He is admitted for acute metabolic encephalopathy, sepsis secondary to pneumonia. Blood cultures grew MSSA. ID has been consulted. Antibiotics were changed to Nafcillin, f/u BCx and TTE, MRI shoulder--hold d/t mentation, anticipate 4 weeks abx course d/t immunosuppressives w/o obvious source. MRI brain pending. SLP recommended pureed diet. MBS pending This Am was drowsy but oriented to self, knew he was in the hospital, stated he was in [place]" and knew the year. Asked if his wife could come visit. He denies fever, chest pain or abdominal pain or SOB. Review of Systems: Unable to assess d/t pt's condition PHYSICAL EXAM  
 
Visit Vitals /69 Pulse 80 Temp 98.8 °F (37.1 °C) Resp 17 Ht 5' 9\" (1.753 m) Wt 76.6 kg (168 lb 14 oz) SpO2 95% BMI 24.94 kg/m² Temp (24hrs), Av.6 °F (37 °C), Min:97.9 °F (36.6 °C), Max:99.4 °F (37.4 °C) Oxygen Therapy O2 Sat (%): 95 % (21 1132) Pulse via Oximetry: 104 beats per minute (21 0001) O2 Device: Room air (21 0453) Intake/Output Summary (Last 24 hours) at 3/16/2021 1412 Last data filed at 3/16/2021 3502 Gross per 24 hour Intake 1331 ml Output  Net 1331 ml General: No acute distress. Alert and oriented x3, though still with confused speech. Able to answer yes or no appropriately and follows basic commands. Lungs:  Rhonchi on b/l lung bases Heart:  tachycardic rate and regular rhythm,  No murmur, rub, or gallop Abdomen: Soft, Non distended, Non tender, Positive bowel sounds Extremities: No cyanosis, clubbing or edema. Scabbed, erythematous multiple small lesions on L shoulder, no vesicles appreciated. Neurologic:  Still with confused speech. No focal deficits. XR SHOULDER LT AP/LAT MIN 2 V Final Result Mild degenerative change. No acute findings. CT HEAD WO CONT Final Result 1. No CT evidence of acute intracranial process. XR CHEST PORT Final Result Low lung volumes with basilar consolidation. MRI BRAIN WO CONT    (Results Pending) XR SWALLOW FUNC VIDEO    (Results Pending) ASSESSMENT Active Hospital Problems Diagnosis Date Noted  Sepsis ([facility] 03/14/2021  Acute metabolic encephalopathy 61/67/0756  PNA (pneumonia) 03/14/2021  Suspected COVID-19 virus infection 03/14/2021  Bacteremia 03/14/2021  AAA (abdominal aortic aneurysm) ([facility] 02/06/2013 Followed by Vascular Surgery  Rheumatoid arthritis ([facility] 08/01/2012  Hypothyroidism 08/01/2012  Mixed hyperlipidemia 08/01/2012  Chronic idiopathic thrombocytopenia ([facility] 08/01/2012 S/P W/U Dr María Stanley Plan: 
 
Sepsis Bacteremia Meets sepsis criteria with HR>100, Tmax >100.4, WBC>12K. UA negative for UTI. CXR shows consolidation in L and R lungs bases. Most likely secondary to pneumonia vs ? meningitis with recent headaches in the past several days. Recent diagnosis of herpes zoster and finished second covid vaccine a few weeks ago. COVID NEGATIVE. Cdiff negative. Abx: Vanc/Zosyn (3/14-3/14) (as immunosuppressive)-- changed to Nafcillin (3/14-. ..) per ID 
BCx 3/13: grew MSSA Repeat BCx 3/15: Pending Obtain TTE--pending Consult ID--Cont with Nafcillin, f/u BCx and TTE, MRI shoulder--hold d/t mentation, anticipate 4 weeks abx course d/t immunosuppressives w/o obvious source F/u BCx, TTE, ID recs Acute metabolic encephalopathy/delirium, improved 
-Most likely AMS 2/2 sepsis per above. Recent zoster. UA negative for UTI. CT head no acute findings. On admission, mumbles, difficult to understand, does not follow commands Avoid sedating meds Fall precautions Delirium precautions PT/OT 
SLP to eval and treat--MBS pending. Pureed diet Infection management as stated Vit B12, folate, ammonia, RPR, HIV, TSH/Free T4--unremarkable MRI brain pending Left shoulder pain Had zoster over the posterior aspect of the left shoulder region few days ago. -Shoulder x-ray--no acute findings, only degenerative changes. -MRI shoulder pending Poor p.o. intake 
-as per daughter Lost about 5 pounds unintentionally.   
-Nutrition consult. -mIVF: D5 w/ normal saline 
-SLP to assist--MBS pending, ok for pureed diet for now Rheumatoid arthritis Holding Leflunomide and Remicade Coronary disease:  
Cont home ASA, statin, ARB Hypothyroidism Cont home synthroid Abdominal aortic aneurysm Cont home statin History of idiopathic thrombocytopenia Monitor CBC Hypertension DC hydrochlorothiazide. Continue losartan DVT Prophylaxis: Lovenox SQ Dispo: Pending improvement. PT/OT ordered. Signed By: Luciano Zepeda DO March 16, 2021

## 2021-03-16 NOTE — PROGRESS NOTES
Patient in bed sleeping. Daughter voiced concern about patient not eating his supper. Safety measures in place. No signs of distress noted. Will continue to monitor.

## 2021-03-16 NOTE — ROUTINE PROCESS
Comprehensive Nutrition Assessment Type and Reason for Visit:  
Best Practice Alert for Malnutrition Screening Tool: Recently Lost Weight Without Trying: Yes, If Yes, How Much Weight Loss: 2-13 lbs, Eating Poorly Due to Decreased Appetite: Yes Complete nutrition assessment on 3/14/21. DIET NUTRITIONAL SUPPLEMENTS All Meals, Dinner, Lunch, Breakfast; Ensure [employer] ( ) DIET PUREED Will continue to follow per plan of care. Aixa Esteban RD, LD Contact: [phone]

## 2021-03-16 NOTE — PROGRESS NOTES
Problem: Falls - Risk of 
Goal: *Absence of Falls Description: Document Anjelica Yarbrough Fall Risk and appropriate interventions in the flowsheet. Outcome: Progressing Towards Goal 
Note: Fall Risk Interventions: 
Mobility Interventions: Bed/chair exit alarm, Patient to call before getting OOB, Strengthening exercises (ROM-active/passive) Mentation Interventions: Adequate sleep, hydration, pain control, Bed/chair exit alarm, More frequent rounding Medication Interventions: Bed/chair exit alarm, Teach patient to arise slowly Elimination Interventions: Bed/chair exit alarm, Call light in reach, Toileting schedule/hourly rounds Problem: Patient Education: Go to Patient Education Activity Goal: Patient/Family Education Outcome: Progressing Towards Goal

## 2021-03-16 NOTE — PROGRESS NOTES
SPEECH PATHOLOGY NOTE: 
 
Modified barium swallow study attempted this PM. Patient unable to rouse despite max verbal, tactile, and thermal stim. RN notified and reports patient has been drowsy on and off throughout the day. Will tentatively re-schedule study for tomorrow pending improved alertness at that time.   
 
[address] CCC-SLP

## 2021-03-16 NOTE — PROGRESS NOTES
Pt returns to room from xray. No distress noted at this. Pt arouses with sternal rub and then goes back to sleep. No distress noted at this time. Call light in reach, will monitor.

## 2021-03-17 PROBLEM — N17.9 AKI (ACUTE KIDNEY INJURY) (HCC): Status: ACTIVE | Noted: 2021-01-01

## 2021-03-17 PROBLEM — I05.9 ENDOCARDITIS OF MITRAL VALVE: Status: ACTIVE | Noted: 2021-01-01

## 2021-03-17 NOTE — PROGRESS NOTES
Upon assessment patient is difficult to arouse (responds minimally  to sternal rub only) this nurse notified Dr. Kathleen Johnson of held medication. (2200 PO aspirin, mucinex, and lipitor)  Will continue to monitor.

## 2021-03-17 NOTE — PROGRESS NOTES
Patient in bed resting quietly. Respirations even and unlabored on room air. Safety measures in place. IV patent and infusing D5 100 mL and nafcillin. Will continue to monitor.

## 2021-03-17 NOTE — PROGRESS NOTES
Hospitalist Progress Note   
3/17/2021 
Admit Date: 3/13/2021  8:05 PM  
NAME: Jairon Mak  
:  1940  
MRN:  [id number]  
Attending: Claudia Rangel DO 
PCP:  Tayo Izquierdo MD 
 
SUBJECTIVE:  
Patient is a 80 y.o. male with medical history of RA on bi-weekly infusion, CAD, idiopathic thrombocytopenia, hypothyroidism, AAA and HTN presented with daughter at bedside as patient is confused cannot give any information. According to daughter, pt started feeling bad since the second Covid vaccine late February. 
On 3/10, he was complaining of left shoulder pain associated with confusion and mumbling speech and was taken to . Daughter was concerned that he might be having a stroke. However he was diagnosed with herpes zoster and was sent home.  He reportedly had an x-ray done at  of left shoulder with no fracture noted. 
 Patient's symptoms persisted with decreased p.o. intake with associated 5 lbs weight loss. He developed a fever morning of 3/13/2021, daughter gave him a dose of Tylenol and brought pt to the ER and was discharged home. Daughter noticed that patient was having chills and again having T103, hence brought patient back to the emergency room. 
Patient at baseline was independent with ADL's and actually can care of his wife who has dementia. 
On admission, patient was only mumbling with difficult to understand speech but did complain of pain on moving the LUE which was in flexed position. He was able to move all other extremities on his own except for left upper extremity but did not really follow verbal commands. 
ED workup includes WBC 13.2, hemoglobin 9.9, creatinine 1.25, GFR of 59. 
Rapid Covid test negative. CT head no acute findings. Chest x-ray shows low lung volumes with basilar consolidation. 
 
Pt was given Ceftriaxone and Azithromycin in ED. He is admitted for acute metabolic encephalopathy, sepsis secondary to pneumonia. Blood cultures grew MSSA. ID has been consulted.  Antibiotics were changed to Nafcillin, f/u BCx. MRI shoulder--hold d/t mentation, anticipate 4 weeks abx course d/t immunosuppressives w/o obvious source. SLP recommended pureed diet. MBS completed 3/17. MRI brain pending. TTE 3/16 w/ EF 55-60%; mod-severe MR with mitral valve vegetation suggesting endocarditis. Cardiology has been consulted. This Am pt was alert but drowsy, oriented to self. Thought he was in [employer]. Speech more confused and incoherent today. Waxing and waning mentation. He denies fever, chest pain or abdominal pain or SOB. Review of Systems: Unable to assess d/t pt's condition PHYSICAL EXAM  
 
Visit Vitals /69 (BP 1 Location: Right arm, BP Patient Position: At rest) Pulse 75 Temp 97 °F (36.1 °C) Resp 20 Ht 5' 9\" (1.753 m) Wt 78.9 kg (173 lb 15.1 oz) SpO2 96% BMI 25.69 kg/m² Temp (24hrs), Av.4 °F (36.9 °C), Min:97 °F (36.1 °C), Max:99.7 °F (37.6 °C) Oxygen Therapy O2 Sat (%): 96 % (21 1108) Pulse via Oximetry: 104 beats per minute (21 0001) O2 Device: Room air (21 1406) Intake/Output Summary (Last 24 hours) at 3/17/2021 1414 Last data filed at 3/17/2021 1036 Gross per 24 hour Intake 438 ml Output  Net 438 ml General: No acute distress. Alert and oriented to self this AM, though still with confused speech, more incoherent this morning. Follows basic commands and answers some yes or no questions appropriately. Waxing and waning. Lungs:  Rhonchi on b/l lung bases Heart:  R rate and R rhythm. 1/6 systolic murmur heard best on mitral region. Abdomen: Soft, Non distended, Non tender, Positive bowel sounds Extremities: No cyanosis, clubbing or edema. Scabbed, erythematous multiple small lesions on L shoulder, no vesicles appreciated. Limited ROM of shoulder d/t pain Neurologic:  Still with confused speech. Waxing and waning mentation. Unable to assess d/t mentation. XR SWALLOW FUNC VIDEO Final Result Laryngeal penetration without tracheal aspiration  Please see speech  
pathologist report for further details. XR SHOULDER LT AP/LAT MIN 2 V Final Result Mild degenerative change. No acute findings. CT HEAD WO CONT Final Result 1. No CT evidence of acute intracranial process. XR CHEST PORT Final Result Low lung volumes with basilar consolidation. MRI BRAIN WO CONT    (Results Pending) ASSESSMENT Active Hospital Problems Diagnosis Date Noted  Sepsis ([facility] [address] 03/14/2021  Acute metabolic encephalopathy 82/01/5997  PNA (pneumonia) 03/14/2021  Suspected COVID-19 virus infection 03/14/2021  Bacteremia 03/14/2021  AAA (abdominal aortic aneurysm) ([facility] [address] 02/06/2013 Followed by Vascular Surgery  Rheumatoid arthritis ([facility] [address] 08/01/2012  Hypothyroidism 08/01/2012  Mixed hyperlipidemia 08/01/2012  Chronic idiopathic thrombocytopenia ([facility] [address] 08/01/2012 S/P W/U Dr Doug Tarango Plan: 
 
Sepsis Bacteremia Mitral valve endocarditis Meets sepsis criteria with HR>100, Tmax >100.4, WBC>12K. UA negative for UTI. CXR shows consolidation in L and R lungs bases. Most likely secondary to pneumonia vs ? meningitis with recent headaches in the past several days. Recent diagnosis of herpes zoster and finished second covid vaccine a few weeks ago. COVID NEGATIVE. Cdiff negative. Abx: Vanc/Zosyn (3/14-3/14) changed to Nafcillin (3/14-. ..) BCx 3/13: grew MSSA Repeat BCx 3/15: GPC 2/2 Repeat BCx 3/17 pending TTE 3/16 w/ EF 55-60%; mod-severe MR with mitral valve vegetation suggesting endocarditis ID on board--Cont with Nafcillin, consult cardiology for AJITH; valve repair vs palliative. MVIE carries high risk of mortality. Cardiology consulted--spoke with daughter in regards to AJITH. Daughter decided no surgery and opted for aggressive IV abx. Will clarify goals of care with family F/u BCx, ID and card recs Acute metabolic encephalopathy/delirium, improved but waxing and waning 
-Most likely AMS 2/2 above sepsis vs septic emboli given new findings of MV endocarditis. CT head no acute findings. On admission, mumbles, difficult to understand, does not follow commands Avoid sedating meds Fall precautions Delirium precautions PT/OT 
SLP to eval and treat--MBS done and recommended pureed diet with 1:1 assistance with meals Infection management as stated Vit B12, folate, ammonia, RPR, HIV, TSH/Free T4--unremarkable MRI brain pending clarification for goals of care with family. If MRI brain shows septic emboli then this would be surgical indication for his MV but family has d/w Cardiology and decided no surgery. Left shoulder pain Had zoster over the posterior aspect of the left shoulder region few days ago. -Shoulder x-ray--no acute findings, only degenerative changes. -MRI shoulder pending--holding until improvement in mentation MING Avoid nephrotoxic meds Accurate I&O's 
Holding home diuretics/ACE-I/ARB MIVF Poor p.o. intake 
-as per daughter Lost about 5 pounds unintentionally.   
-Nutrition consult. -mIVF: Changed to D5W d/t hypernatremia 
-SLP to assist--MBS pending, ok for pureed diet for now Hypernatremia D/t poor po intake IVF: D5W BMP daily Rheumatoid arthritis Holding Leflunomide and Remicade Coronary disease:  
Cont home ASA, statin, ARB Hypothyroidism Cont home synthroid Abdominal aortic aneurysm Cont home statin History of idiopathic thrombocytopenia Monitor CBC Hypertension DC hydrochlorothiazide. D/c Losartan d/t MING Normotensive DVT Prophylaxis: Lovenox SQ Dispo: Poor prognosis with maxing/waning mentation, bacteremia and MVIE. Daughter decided no on surgery and wants IV abx. Needs to clarify goals of care. Palliative vs SNF pending improvement. PT/OT ordered--Rehab to [facility] pending. Signed By: Nick Allen DO March 17, 2021

## 2021-03-17 NOTE — PROGRESS NOTES
CM spoke to [facility] at [facility]. A bed may be available at [facility] on Monday if patient is stable. Tash was requesting the last date patient had Remicade as this is an expensive medication and patient would not be accepted if he needed it.

## 2021-03-17 NOTE — PROGRESS NOTES
Pt sitting up in bed with family at bedside. Pt remains confused. No distress noted at this time. Good po intake for dinner call light in reach, door open will monitor.

## 2021-03-17 NOTE — PROGRESS NOTES
ACUTE PHYSICAL THERAPY GOALS: 
(Developed with and agreed upon by patient and/or caregiver.) STG: 
(1.)Mr. Aretha Damico will move from supine to sit and sit to supine , scoot up and down and roll side to side with MODERATE ASSIST within 3 treatment day(s). GOAL MET 3/17/2021 
 
(2.)Mr. Aretha Damico will transfer from bed to chair and chair to bed with MINIMAL ASSIST of 1 using the least restrictive device within 3 treatment day(s). (3.)Mr. Aretha Damico will ambulate with MINIMAL ASSIST for 25+ feet with the least restrictive device within 3 treatment day(s).  
  
LTG: 
(1.)Mr. Aretha Damico will move from supine to sit and sit to supine , scoot up and down and roll side to side in bed with CG to MINIMAL ASSIST within 7 treatment day(s). (2.)Mr. Aretha Damico will transfer from bed to chair and chair to bed with CONTACT GUARD ASSIST using the least restrictive device within 7 treatment day(s). (3.)Mr. Aretha Damico will ambulate with CONTACT GUARD ASSIST for 150+ feet with the least restrictive device within 7 treatment day(s). PHYSICAL THERAPY: Daily Note and AM Treatment Day # 2 Onel Goddard is a 79 y.o. male PRIMARY DIAGNOSIS: Sepsis ([facility] [address] Sepsis ([facility] [address] [A41.9] Acute metabolic encephalopathy [R50.79] ASSESSMENT:  
 
REHAB RECOMMENDATIONS: CURRENT LEVEL OF FUNCTION: 
(Most Recently Demonstrated) Recommendation to date pending progress: 
Setting:  Short-term Rehab Equipment:  To Be Determined Bed Mobility:  Moderate Assistance to max A Sit to Stand:  Moderate Assistance x 2 Transfers:  Not tested Gait/Mobility:  Not tested ASSESSMENT: 
Mr. Aretha Damico is sleeping and very confused. He was able to perform exercises supine with a lot of encouragement. He sat up with mod/max A and sat EOB with poor sitting balance and a R lean. He was able to stand into the walker with mod A x2 and take a few shuffled steps up toward the head of the bed. Confusion is primary road block to improved mobility.   Will likely need a rehab stay. SUBJECTIVE:  
Mr. Severa Marking states, \"mumbled and incoherent much of the time. \" SOCIAL HISTORY/ LIVING ENVIRONMENT:  Mr. Severa Marking lives with his wife who has dementia, whom he cares for. He is independent. OBJECTIVE:  
 
PAIN: VITAL SIGNS: LINES/DRAINS:  
Pre Treatment: Pain Screen Pain Scale 1: FLACC Pain Intensity 1: 0 Post Treatment: 0   IV 
O2 Device: Room air MOBILITY: I Mod I S SBA CGA Min Mod Max Total  NT x2 Comments:  
Bed Mobility Rolling [] [] [] [] [] [] [] [] [] [] [] Supine to Sit [] [] [] [] [] [] [x] [x] [] [] [] Scooting [] [] [] [] [x] [] [] [] [] [] [] Sit to Supine [] [] [] [] [] [] [] [x] [] [] [x] Transfers Sit to Stand [] [] [] [] [] [] [x] [] [] [] [x] Bed to Chair [] [] [] [] [] [] [] [] [] [x] [] Stand to Sit [] [] [] [] [] [] [x] [] [] [] [] I=Independent, Mod I=Modified Independent, S=Supervision, SBA=Standby Assistance, CGA=Contact Guard Assistance,  
Min=Minimal Assistance, Mod=Moderate Assistance, Max=Maximal Assistance, Total=Total Assistance, NT=Not Tested BALANCE: Good Fair+ Fair Fair- Poor NT Comments Sitting Static [] [] [] [x] [] [] Sitting Dynamic [] [] [] [] [x] [] Standing Static [] [] [] [x] [] []   
Standing Dynamic [] [] [] [x] [] [] GAIT: I Mod I S SBA CGA Min Mod Max Total  NT x2 Comments:  
Level of Assistance [] [] [] [] [] [x] [x] [] [] [] [x] Distance Shuffled steps along EOB   
DME Rolling Niurka Kenny Gait Quality shuffled Weightbearing Status N/A I=Independent, Mod I=Modified Independent, S=Supervision, SBA=Standby Assistance, CGA=Contact Guard Assistance,  
Min=Minimal Assistance, Mod=Moderate Assistance, Max=Maximal Assistance, Total=Total Assistance, NT=Not Tested PLAN:  
FREQUENCY/DURATION: PT Plan of Care: 3 times/week for duration of hospital stay or until stated goals are met, whichever comes first. 
TREATMENT:  
 
TREATMENT:  
($$ Therapeutic Activity: 8-22 mins $$ Therapeutic Exercises: 8-22 mins    ) Therapeutic Activity (15 Minutes): Therapeutic activity included Supine to Sit, Sit to Supine, Lateral Scooting, Transfer Training, Sitting balance  and Standing balance to improve functional Mobility, Strength and Activity tolerance. Therapeutic Exercise (10 Minutes): Therapeutic exercises noted below to improve functional strength and mobility. TREATMENT GRID: 
 Date: 
3/17/21 Date: 
 Date: Activity/Exercise Parameters Parameters Parameters Supine AP 20x B Supine heel slides 10x B AA Supine SAQ 10x B AA    
     
     
     
     
 
 
 
AFTER TREATMENT POSITION/PRECAUTIONS: 
Bed, Needs within reach and RN notified INTERDISCIPLINARY COLLABORATION: 
RN/PCT and PT/PTA TOTAL TREATMENT DURATION: 
PT Patient Time In/Time Out Time In: 1115 Time Out: 1140 Jamal Ann, PTA

## 2021-03-17 NOTE — PROGRESS NOTES
Infectious Disease Progress Note Today's Date: 3/17/2021 Admit Date: 3/13/2021 Impression: · MSSA bacteremia with MVIE(3/13, 3/15); repeat blood cx 3/17 pending; TTE: severe MR/IE ; source uncertain, does have hardware in back but no other prosthetic joints · Acute nontraumatic L shoulder pain · Fever (103), curve improving · RA; on remicade · MING, Cr rise noted. · Aortic Aneurysm, not repaired per report Plan:  
· Consult cardiology to evaluate for AJITH; valve repair vs palliative approach will need to be addressed · Consider brain MRI again tomorrow, pending cardiology input. · Follow Cr closely · Continue Nafcillin continuous infusion Anti-infectives: · IV Nafcillin (3/14- 
· IV Vancomycin (3/14) · IV Rocephin (3/13) · IV Zithromax (3/13-3/14) Subjective: Intermittently confused, wants to eat. RN updated at bedside Allergies Allergen Reactions  Citric Acid Other (comments)  Lisinopril Other (comments) lightheadedness Review of Systems:  A comprehensive review of systems was negative except for that written in the History of Present Illness. Objective:  
 
Visit Vitals /72 (BP 1 Location: Right arm, BP Patient Position: At rest) Pulse 78 Temp 98.6 °F (37 °C) Resp 20 Ht 5' 9\" (1.753 m) Wt 78.9 kg (173 lb 15.1 oz) SpO2 97% BMI 25.69 kg/m² Temp (24hrs), Av.7 °F (37.1 °C), Min:97.8 °F (36.6 °C), Max:99.7 °F (37.6 °C) Lines:  Peripheral IV:   Intact Physical Exam:   
General:  NAD, laying in bed Eyes:  No icterus or drainage Mouth/Throat: Dry MM Neck: Supple Lungs:   CTAB with normal WOB  
CV:  Regular rate and rhythm, 1/6 early systolic murmur heard best at LUSB Abdomen:   Soft/NDNT Extremities: Pitting edema around ankles bilaterally Skin: No rash, healed scabs Musculoskeletal: No swollen joints, limited/painful ROM of L shoulder, improved from yesterday Psych: Awake, oriented to person and situation Data Review: CBC: 
Recent Labs  
  03/17/21 
0644 03/16/21 
0610 03/15/21 
1579 WBC 9.8 11.9* 17.5* GRANS 77 81* 84* MONOS 8 7 5 EOS 1 0* 0* ANEU 7.5 9.7* 14.7* ABL 1.3 1.2 1.0 HGB 9.5* 9.4* 10.2* HCT 29.8* 29.6* 32.6*  
* 107* 141* BMP: 
Recent Labs  
  03/17/21 
0644 03/16/21 
0610 03/15/21 
2649 CREA 1.61* 1.63* 1.53* BUN 49* 50* 41* * 145 142  
K 3.7 4.1 3.9 * 116* 112* CO2 22 23 25 AGAP 7 6* 5* * 102* 114* LFTS: 
No results for input(s): TBILI, ALT, AP, TP, ALB in the last 72 hours. No lab exists for component: SGOT Microbiology:  
 
All Micro Results Procedure Component Value Units Date/Time CULTURE, BLOOD [[id number]] Collected: 03/17/21 9198 Order Status: Completed Specimen: Blood Updated: 03/17/21 7732 CULTURE, BLOOD [[id number]] Collected: 03/17/21 2654 Order Status: Completed Specimen: Blood Updated: 03/17/21 0805 CULTURE, BLOOD [[id number]]  (Abnormal) Collected: 03/15/21 3454 Order Status: Completed Specimen: Blood Updated: 03/17/21 0750 Special Requests: --     
  LEFT 
ARM 
  
  GRAM STAIN AEROBIC BOTTLE POSITIVE     
      
  GRAM POS COCCI IN CLUSTERS RESULTS VERIFIED, PHONED TO AND READ BACK BY Vicky Flores RN [id number] ON [id number] [place] Culture result: STAPHYLOCOCCUS AUREUS For Susceptibility Refer to Culture [facility] [id number] CULTURE, BLOOD [[id number]]  (Abnormal) Collected: 03/15/21 1700 Order Status: Completed Specimen: Blood Updated: 03/17/21 5572 Special Requests: --     
  LEFT 
HAND 
  
  GRAM STAIN AEROBIC BOTTLE POSITIVE     
      
  GRAM POS COCCI IN CLUSTERS RESULTS VERIFIED, PHONED TO AND READ BACK BY Vicky Flores RN [id number] ON [id number] [place] Culture result: STAPHYLOCOCCUS AUREUS For Susceptibility Refer to Culture [facility] [id number] CULTURE, BLOOD [[id number]]  (Abnormal) Collected: 03/13/21 2114  Order Status: Completed Specimen: Blood Updated: 03/16/21 0557 Special Requests: --     
  LEFT Antecubital 
  
  GRAM STAIN    
  GRAM POS COCCI IN CLUSTERS  
     
      
  AEROBIC AND ANAEROBIC BOTTLES  
     
      
  CRITICAL RESULT NOT CALLED DUE TO PREVIOUS NOTIFICATION OF CRITICAL RESULT WITHIN THE LAST 24 HOURS. Culture result: STAPHYLOCOCCUS AUREUS For Susceptibility Refer to Culture [address] [place] [address] Drive NO. [id number] CULTURE, BLOOD [[id number]]  (Abnormal)  (Susceptibility) Collected: 03/13/21 2114 Order Status: Completed Specimen: Blood Updated: 03/16/21 0557 Special Requests: --     
  RIGHT Antecubital 
  
  GRAM STAIN    
  GRAM POS COCCI IN CLUSTERS  
     
      
  AEROBIC AND ANAEROBIC BOTTLES RESULTS VERIFIED, PHONED TO AND READ BACK BY Brendon Guillaume RN @ 1120 ON 3/14/21 BY AMM Culture result: STAPHYLOCOCCUS AUREUS Refer to Blood Culture ID Panel Accession [id number] C. DIFFICILE AG & TOXIN A/B [[id number]] Collected: 03/14/21 2219 Order Status: Completed Specimen: Stool Updated: 03/15/21 1303 [id number] ANTIGEN    
  C. DIFFICILE GDH ANTIGEN-NEGATIVE  
     
  C. difficile toxin C. DIFFICILE TOXIN-NEGATIVE  
     
  PCR Reflex NOT APPLICABLE INTERPRETATION    
  NEGATIVE FOR TOXIGENIC C. DIFFICILE Clinical Consideration NEGATIVE FOR TOXIGENIC C. DIFFICILE  
     
 BLOOD CULTURE ID PANEL [[id number]]  (Abnormal) Collected: 03/13/21 2114 Order Status: Completed Specimen: Blood Updated: 03/14/21 1120 Acc. no. from [employer] A/S [id number] Staphylococcus Detected Staphylococcus aureus Detected Comment: RESULTS VERIFIED, PHONED TO AND READ BACK BY 
Brendon Guillaume RN @ 1120 ON 3/14/21 BY AMM 
  
  
  mecA (Methicillin-Resistance Genes) NOT DETECTED INTERPRETATION Gram positive cocci in clusters, identified in realtime PCR as probable MSSA.   
     
  Comment: Recommend discontinuing IV vancomycin starting cefazolin or nafcillin if patient not on beta-lactam therapy. Infectious Diseases Consult recommended in adult patients. THIS TEST DOES NOT REPLACE SENSITIVITY TESTING. COVID-19 RAPID TEST [[id number]] Collected: 03/13/21 2357 Order Status: Completed Specimen: Nasopharyngeal Updated: 03/14/21 0038 Specimen source Nasopharyngeal     
  COVID-19 rapid test Not detected Comment:     
The specimen is NEGATIVE for SARS-CoV-2, the novel coronavirus associated with COVID-19. A negative result does not rule out COVID-19. This test has been authorized by the FDA under an Emergency Use Authorization (EUA) for use by authorized laboratories. Fact sheet for Healthcare Providers: [employer]Update[employer]co.nz Fact sheet for Patients: [employer]Update[employer] Methodology: Isothermal Nucleic Acid Amplification COVID-19 RAPID TEST [[id number]] Collected: 03/13/21 2357 Order Status: Canceled Imaging:  
reviewed Signed By: Lisa Benavides NP March 17, 2021

## 2021-03-17 NOTE — PROGRESS NOTES
Attempted to call daughter/POA Xiomara Mckeon [phone] multiple times to update her as well as to have goals of care discussion but unsuccessful at reaching her. Left voicemail.   
 
Kaushal Covarrubias DO

## 2021-03-17 NOTE — PROGRESS NOTES
Pt returns to room from xray. Pt alert but confused. No distress noted at this time. Call light in reach, will monitor.

## 2021-03-17 NOTE — PROGRESS NOTES
Upon assessment patient is alert to self. Patient is more alert and responsive this am. Patient was able to swallow PO medication this am. Safety measures in place. No signs of distress. Respirations even and unlabored on room air. No needs expressed at this time.

## 2021-03-17 NOTE — H&P
[facility] Cardiology Initial Cardiac Evaluation Date of  Admission: 3/13/2021  8:05 PM  
 
Primary Care Physician: Dr. Lashell Hollis Primary Cardiologist: Dr. Nona Ching Referring Physician: ID- Dr. Cecilia Wilkes Supervising Cardiologist: Dr. Namita Fitzgerald Reason for cardiac evaluation: MSSA bacteremia with TTE showing MVIE and mod to severe reg of MV. ?if severe enough to consider surgical intervention vs palliative care. Kole Escudero is a 79 y.o. male with prior h/o CAD s/p PCI mRCA 2014, HTN, chronic idiopathic thrombocytopenia, RA, AAA followed by vascular, chronic anemia, HARI unable to tolerate CPAP, dementia, and lumbar stenosis followed by Dr. Ceasar Campo. Patient to ED at [facility] with confusion. Per H&P, patient started feeling bad after second covid vaccine. On Wednesday he was complaining of left shoulder pain and confused. He was taken to . He was dx with zoster and sent home. Patient continued with sx and also decreased po intake noted. The morning of 3/13 he developed fever and brought to ED but sent home. Then chills noted later in day and brought him back to ED. Labs in ED showed WBC 13.2, Hgb 9.9, Cr 1.25, rapid covid neg, CT no acute findings. Hospitalist admitted patient with sepsis, CAP, AMS. His Bl cx were + for MSSA and ID consulted. Echo showed mod to severe MR and mitral leaflet vegetation. Cardiology was consulted for MSSA bacteremia with TTE showing MVIE and mod to severe reg of MV. ?if severe enough to consider surgical intervention vs palliative care. I spoke with dgbang Cifuentes this am who reported her dad hasn't been self since Dec 2020. Very forgetful not as sharp as before, and poor appetite. Dgt moved in with them and taking care of mom that will be going to memory unit later this week. Patient Active Problem List  
Diagnosis Code  Lumbar stenosis with neurogenic claudication M48.062  Rheumatoid arthritis ([facility] M06.9  Hypothyroidism E03.9  Mixed hyperlipidemia E78.2  Chronic idiopathic thrombocytopenia (HCC) D69.3  AAA (abdominal aortic aneurysm) (HCC) I71.4  Dyspepsia, controlled by Zantac R10.13  Ophthalmic migraine G43. 310 [facility]  Anemia, chronic disease D63.8  HARI (obstructive sleep apnea) G47.33  
 Pleural plaque with presence of asbestos J92.0  Left knee DJD M17.12  
 Adjustment disorder F43.20  Essential hypertension, benign I10  
 ASCAD - of the native vessel I25.10  PVC (premature ventricular contraction) I49.3  Sepsis ([address] A41.9  Acute metabolic encephalopathy O81.04  
 PNA (pneumonia) J18.9  Suspected COVID-19 virus infection Z20.822  Bacteremia R78.81 Past Medical History:  
Diagnosis Date  Aneurysm ([address]  ASCAD - of the native vessel 6/13/2016  CAD (coronary artery disease) 3 stents  had M.I. 2001  Cancer ([address] melanoma  Chronic pain   
 back  Coagulation disorder ([address]   
 pt denies this  Crohn's colitis (Dr Fifi Mata) 8/1/2012  crohns's disease   
 stable  Disorder of bone and cartilage, unspecified 8/11/2014  Dyslipidemia 6/13/2016  GERD (gastroesophageal reflux disease)   
 under control with med  History of skin cancer   
 ear and right forearm  Hypercholesterolemia  Hypertension  MI (myocardial infarction) ([address] 2001  Obesity 30.5 bmi  
 Other specified inflammatory polyarthropathies(714.89) 1/9/2013  PVC (premature ventricular contraction)   
 on Kardia RS  
 PVC (premature ventricular contraction)  RA (rheumatoid arthritis) (HCC)   
 takes remicaid  Status Post PTCA/stent 6/13/2016  Thrombocytopenia, unspecified ([address] 4/10/2014  Thyroid disease   
 hypo. under control with med  Trochanteric bursitis 5/1/2013  Unspecified sleep apnea   
 not using c-pap Past Surgical History:  
Procedure Laterality Date  HX CATARACT REMOVAL    
 gadiel iol  HX COLONOSCOPY  Dec 2003 [address] CATHETERIZATION  2001 and 2003  
 1 stent 2001 then 2 stents 2003 per pt  HX HEENT    
 floater laser surgery  HX HERNIA REPAIR    
 right  HX LUMBAR FUSION  2010-2011 X 2  
 HX ORTHOPAEDIC    
 back surgery x 2  
 HX OTHER SURGICAL  08/2017  
 back surgery, Dr Sajan Mcdowell  HX TONSILLECTOMY [facility]  2014  FL SINUS SURGERY PROC UNLISTED Allergies Allergen Reactions  Citric Acid Other (comments)  Lisinopril Other (comments) lightheadedness Family History Problem Relation Age of Onset  Heart Disease Mother  Hypertension Mother  Heart Disease Father  Hypertension Father  Other Brother AAA  Gall Bladder Disease Brother Current Facility-Administered Medications Medication Dose Route Frequency  dextrose 5% infusion  100 mL/hr IntraVENous CONTINUOUS  
 guaiFENesin ER (MUCINEX) tablet 600 mg  600 mg Oral Q12H  aspirin delayed-release tablet 81 mg  81 mg Oral QHS  atorvastatin (LIPITOR) tablet 40 mg  40 mg Oral QHS  levothyroxine (SYNTHROID) tablet 100 mcg  100 mcg Oral ACB  nitroglycerin (NITROSTAT) tablet 0.4 mg  0.4 mg SubLINGual Q5MIN PRN  
 sodium chloride (NS) flush 5-40 mL  5-40 mL IntraVENous Q8H  
 sodium chloride (NS) flush 5-40 mL  5-40 mL IntraVENous PRN  
 acetaminophen (TYLENOL) tablet 650 mg  650 mg Oral Q6H PRN Or  
 acetaminophen (TYLENOL) suppository 650 mg  650 mg Rectal Q6H PRN  polyethylene glycol (MIRALAX) packet 17 g  17 g Oral DAILY PRN  promethazine (PHENERGAN) tablet 12.5 mg  12.5 mg Oral Q6H PRN Or  
 ondansetron (ZOFRAN) injection 4 mg  4 mg IntraVENous Q6H PRN  
 enoxaparin (LOVENOX) injection 40 mg  40 mg SubCUTAneous DAILY  famotidine (PEPCID) tablet 20 mg  20 mg Oral DAILY  morphine injection 0.5 mg  0.5 mg IntraVENous Q6H PRN  
 nafcillin (NALLPEN) 12 g in 0.9% sodium chloride 1,000 mL continuous infusion  12 g IntraVENous Q24H Review of Systems Constitution: Positive for chills, fever and malaise/fatigue.  
     + decrease po intake Musculoskeletal:  
     + left shoulder pain Psychiatric/Behavioral: Positive for altered mental status. Physical Exam 
Vitals:  
 03/16/21 2354 03/17/21 7539 03/17/21 0630 03/17/21 5584 BP: 109/70 117/76  113/72 Pulse: 76 80  78 Resp: 20 20  20 Temp: 99.7 °F (37.6 °C) 97.8 °F (36.6 °C)  98.6 °F (37 °C) SpO2: 94% 93%  97% Weight:   78.9 kg (173 lb 15.1 oz) Height:      
 
 
Physical Exam: 
General: pleasantly confused HEENT: pupils equal and round, no abnormalities noted Neck: supple, no JVD, no carotid bruits Heart: S1S2 with RRR without murmurs or gallops Lungs: Clear throughout auscultation bilaterally without adventitious sounds Abd: soft, nontender, nondistended, with good bowel sounds Ext: warm, no edema, calves supple/nontender, pulses 2+ bilaterally Skin: warm and dry Psychiatric: Normal mood and affect Neurologic: Alert to person only not place and time Cardiographics Telemetry: SR 
ECG: NA Echocardiogram: -  Left ventricle: Systolic function was normal. Ejection fraction was 
estimated in the range of 55 % to 60 %. There is apical akinesis without apical 
thrombus. The remainder of the LV has normal regional wall motion. Left ventricular diastolic function parameters are abnormal.-  Right ventricle: There was mild pulmonary artery hypertension.-  Left atrium: The atrium was mildly dilated. -  Inferior vena cava, hepatic veins: The inferior vena cava was dilated. The respirophasic change in diameter was less than 50%. -  Aortic valve: Leaflets exhibited mild sclerosis. There was no evidence for 
vegetation.-  Mitral valve: There was at least moderate to severe very eccentric 
regurgitation. The regurgitant jet was eccentric and anteromedially directed, 
consistent with posterior leaflet dysfunction.  There was a definite posterior 
mitral leaflet vegetation with thickening and echogenicity of the posterolateral mitral annulus as well, suggesting endocarditis. -  Tricuspid valve: There was no evidence for vegetation. 
 -  Pulmonic valve: There was no evidence for vegetation.-  Aorta, systemic arteries: The root exhibited mild dilatation. Labs:  
Recent Labs  
  03/17/21 
0644 03/16/21 
9299 * 145  
K 3.7 4.1 BUN 49* 50* CREA 1.61* 1.63* * 102* WBC 9.8 11.9* HGB 9.5* 9.4* HCT 29.8* 29.6*  
* 107* Assessment/Plan: 
 
 Assessment:  
  
Principal Problem: 
  Sepsis ([facility] (3/14/2021)- per primary team and ID; on IV abx Active Problems: 
  Rheumatoid arthritis ([facility] (8/1/2012) Hypothyroidism (8/1/2012) Mixed hyperlipidemia (8/1/2012) Chronic idiopathic thrombocytopenia ([facility] (8/1/2012) Overview: S/P W/U Dr Domingo Torres AAA (abdominal aortic aneurysm) ([facility] (2/6/2013) Overview: Followed by Vascular Surgery Acute metabolic encephalopathy (2/06/6447) PNA (pneumonia) (3/14/2021) Suspected COVID-19 virus infection (3/14/2021) Bacteremia (3/14/2021)- See above; spoke with dgt regarding w/u including AJITH . Discussed AJITH and risks. Dgt doesn't want surgery for valve anyway and she opt for aggressive IV abx for now. Please call us if needed. I notified hospitalist Dr. Mahnaz Blackwell of dgt's wishes. We appreciate the opportunity to participate in this patient's care. Sloan August NP 
Supervising MD: Dr. Carole Blackwell

## 2021-03-17 NOTE — PROGRESS NOTES
LTG: Patient will tolerate least restrictive diet without overt signs or symptoms of airway compromise. STG: Patient will tolerate pureed diet and thin liquids without overt signs or symptoms of airway compromise. STG: Patient will participate in modified barium swallow study as clinically indicated. - MET 3/17/21 STG: Patient will participate in dysphagia exercises with 80% accuracy with moderate cues. ADDED 3/17/21 SPEECH LANGUAGE PATHOLOGY: MODIFIED BARIUM SWALLOW STUDY Initial Assessment NAME/AGE/GENDER: Jeanie Winston is a 79 y.o. male DATE: 3/17/2021 PRIMARY DIAGNOSIS: Sepsis ([facility] [address] [A41.9] Acute metabolic encephalopathy [J03.77] ICD-10: Treatment Diagnosis: Oropharyngeal dysphagia (R13.12) INTERDISCIPLINARY COLLABORATION: Radiologist, [place] PRECAUTIONS/ALLERGIES: Citric acid and Lisinopril RECOMMENDATIONS/PLAN  
DIET:  
 PO diet texture:  Pureed  Liquids:  regular thin  PROVALE CUP ONLY 
 
MEDICATIONS: Crushed in puree COMPENSATORY STRATEGIES/MODIFICATIONS INCLUDING: 
· 1:1 assistance with all PO 
  
OTHER RECOMMENDATIONS (including follow up treatment recommendations): · Treatment to improve/facilitate oral/pharyngeal skills · Patient/family education RECOMMENDATIONS for CONTINUED SPEECH THERAPY:  
YES: Anticipate need for ongoing speech therapy during this hospitalization and at next level of care. FREQUENCY/DURATION: Continue to follow patient 3 times a week for duration of hospital stay to address above goals. Next treatment session will address diet tolerance and dysphagia exercises ASSESSMENT Mr. Severa Marking presents with moderate oropharyngeal dysphagia characterized by impaired oral prep with chewable textures that results in piecemeal swallow and premature spillage of liquids to the pyriforms prior to initiation of swallow.  Deep penetration during the swallow with straw sips; SILENT aspiration before the swallow with large, piecemealed sip by cup. Only trace, clearing penetration with small cups sips of water. Decreased hyolaryngeal excursion with reduced epiglottic inversion contributing to penetration. Appropriate pharyngeal clearance with puree textured. With chewable textures, greatly prolonged oral prep and spillage of nearly entire bolus to vallecula prior to swallow. Suspect alertness and AMS contributing to swallow delay during study. Recommend puree diet with thin liquids via PROVALE cup only as this automatically reduces bolus size. He will require 1:1 assistance during meals. Medications crushed in puree. Will follow for ongoing speech therapy to include diet tolerance, training on use of provale cup, and dysphagia exercises once AMS improves. SUBJECTIVE History of Present Injury/Illness: Mr. Mik Gann  has a past medical history of Aneurysm ([address] ASCAD - of the native vessel (6/13/2016), CAD (coronary artery disease), Cancer ([address] Chronic pain, Coagulation disorder ([address] Crohn's colitis (Dr Jo Timmons) (8/1/2012), crohns's disease, Disorder of bone and cartilage, unspecified (8/11/2014), Dyslipidemia (6/13/2016), GERD (gastroesophageal reflux disease), History of skin cancer, Hypercholesterolemia, Hypertension, MI (myocardial infarction) ([address] Obesity, Other specified inflammatory polyarthropathies(714.89) (1/9/2013), PVC (premature ventricular contraction), PVC (premature ventricular contraction), RA (rheumatoid arthritis) ([address] Status Post PTCA/stent (6/13/2016), Thrombocytopenia, unspecified ([address] (4/10/2014), Thyroid disease, Trochanteric bursitis (5/1/2013), and Unspecified sleep apnea. . He also  has a past surgical history that includes hx lumbar fusion ([id number]); hx colonoscopy (Dec 2003); hx heart catheterization (2001 and 2003); pr left heart cath,percutaneous (2014); hx cataract removal; hx tonsillectomy; pr sinus surgery proc unlisted; hx heent; hx orthopaedic; hx hernia repair; and hx other surgical (08/2017).   
Pain: Pain Intensity 1: 0 Current dietary status prior to evaluation today:  Puree/thin liquids Previous Modified Barium Swallow studies: N/A Current Medications: No current facility-administered medications on file prior to encounter. Current Outpatient Medications on File Prior to Encounter Medication Sig Dispense Refill  [] traMADoL (Ultram) 50 mg tablet Take 1 Tab by mouth every eight (8) hours as needed for Pain for up to 3 days. Max Daily Amount: 150 mg. 11 Tab 0  
 levothyroxine (SYNTHROID) 100 mcg tablet Take 1 Tab by mouth Daily (before breakfast). 90 Tab 1  
 losartan-hydroCHLOROthiazide (HYZAAR) 100-12.5 mg per tablet Take 1 Tab by mouth daily (with dinner). 90 Tab 1  
 atorvastatin (LIPITOR) 40 mg tablet TAKE 1 TABLET EVERY EVENING 90 Tab 1  
 leflunomide (ARAVA) 20 mg tablet One daily 30 Tab 2  
 diclofenac (VOLTAREN) 1 % gel Apply 4 g to affected area four (4) times daily. 100 g 4  
 nitroglycerin (NITROSTAT) 0.4 mg SL tablet 1 Tab by SubLINGual route every five (5) minutes as needed for Chest Pain. 1 Bottle 3  
 fluticasone (FLONASE) 50 mcg/actuation nasal spray 2 Sprays by Both Nostrils route as needed for Rhinitis. 1 Bottle 3  
 INFLIXIMAB (REMICADE IV) by IntraVENous route. Every 8 weeks  aspirin delayed-release 81 mg tablet Take 81 mg by mouth nightly. OBJECTIVE Orientation:  Person  Disoriented to time and location Oral Assessment:  Labial: Decreased seal 
Lingual: Decreased strength Dentition: Upper and Lower Dentures Oral Hygiene: Adequate Vocal Quality: Low volume and dysarthric Modified barium swallow study was performed in the radiology suite with Mr. Genesis Johnson in the lateral plane using C-arm. To evaluate his swallow function, barium coated liquid and food was administered in the form of thin liquids, pudding and mixed consistency. Oral phase of swallow:  
 prolonged bolus manipulation  reduced posterior propulsion skills  premature spillage to pharynx pre-swallow  piecemeal deglutition Pharyngeal phase of swallow:  Swallows of thin liquids were piecemealed, triggered at pyriform sinuses and with pooling in valleculae for 1-2 seconds prior to swallow initiation. There was transient laryngeal penetration observed during the swallow with small cup sips that was cleared spontaneously during the swallow. There was silent aspiration observed before the swallow from large piecemealed cup sip that did not trigger a cough or throat clear response. Deep non-clearing penetration with straw sips. Residue was observed after the swallow in valleculae.  Swallows of pudding were triggered at valleculae. No laryngeal penetration or aspiration was observed. No pharyngeal residue after swallow.  Swallows of mixed consistencies were triggered at valleculae and with pooling in valleculae for 4-5 seconds prior to swallow initiation. No laryngeal penetration or aspiration was observed. Pharyngeal characteristics: 
 delayed and incoordinated retraction of base of tongue  reduced epiglottic inversion  delayed laryngeal closure Attempted strategies:  
 small cup sips Effective strategies:  
 small cup sips Aspiration/Penetration Scale: 8 (Silent aspiration. Contrast passes below the folds/glottis with no effort to eject.) Cervical esophageal phase of swallow:  
 adequate and timely clearance of all boluses through cervical esophagus **Distal esophagus not assessed due to limitations of MBS study. Assessment/Reassessment only, no treatment provided today. Tool Used: Dysphagia Outcome and Severity Scale (VICTOR M) Comments Normal Diet With no strategies or extra time needed Functional Swallow May have mild oral or pharyngeal delay Mild Dysphagia Which may require one diet consistency restricted Mild-Moderate Dysphagia With 1-2 diet consistencies restricted Moderate Dysphagia With 2 or more diet consistencies restricted Moderately Severe Dysphagia With partial PO strategies (trials with ST only) Severe Dysphagia With inability to tolerate any PO safely Score:  Initial: 3 Most Recent: 3 (Date:3/17/2021) Interpretation of Tool: The Dysphagia Outcome and Severity Scale (VICTOR M) is a simple, easy-to-use, 7-point scale developed to systematically rate the functional severity of dysphagia based on objective assessment and make recommendations for diet level, independence level, and type of nutrition. [place] / [place] / Product Type: Medicare /  
 
Education: · Recommendations discussed with patient and RN at end of session. Safety: After treatment position/precautions: 
· Patient waiting in radiology holding bay for transport back to room. Total Treatment Duration: 
Time In: 0830 Time Out: 0900 [address] CCC-SLP

## 2021-03-17 NOTE — PROGRESS NOTES
Problem: Falls - Risk of 
Goal: *Absence of Falls Description: Document Kristin Cuello Fall Risk and appropriate interventions in the flowsheet. Outcome: Progressing Towards Goal 
Note: Fall Risk Interventions: 
Mobility Interventions: Bed/chair exit alarm, Patient to call before getting OOB Mentation Interventions: Adequate sleep, hydration, pain control, Bed/chair exit alarm Medication Interventions: Patient to call before getting OOB, Teach patient to arise slowly Elimination Interventions: Bed/chair exit alarm, Call light in reach Problem: Patient Education: Go to Patient Education Activity Goal: Patient/Family Education Outcome: Progressing Towards Goal

## 2021-03-18 NOTE — PROGRESS NOTES
LTG: Patient will tolerate least restrictive diet without overt signs or symptoms of airway compromise. STG: Patient will tolerate pureed diet and thin liquids without overt signs or symptoms of airway compromise. STG: Patient will participate in modified barium swallow study as clinically indicated. - MET 3/17/21 STG: Patient will participate in dysphagia exercises with 80% accuracy with moderate cues. ADDED 3/17/21 SPEECH LANGUAGE PATHOLOGY: DYSPHAGIA- Daily Note 1 NAME/AGE/GENDER: Kayleen Gomes is a 79 y.o. male DATE: 3/18/2021 PRIMARY DIAGNOSIS: Sepsis ([facility] [A41.9] Acute metabolic encephalopathy [R97.69] ICD-10: Treatment Diagnosis: R13.12 Dysphagia, Oropharyngeal Phase RECOMMENDATIONS  
DIET:  
 PO:  Pureed  Liquids:  regular thin via provale cup MEDICATIONS: Crushed in puree ASPIRATION PRECAUTIONS · Slow rate of intake · Small bites/sips · Upright at 90 degrees during meal 
  
COMPENSATORY STRATEGIES/MODIFICATIONS · Small sips and bites · Provale cup with liquids · Assistance with intake EDUCATION: 
· Recommendations discussed with Nursing and Patient CONTINUATION OF SKILLED SERVICES/MEDICAL NECESSITY: 
 Patient is expected to demonstrate progress in  swallow strength, swallow timeliness, swallow function, diet tolerance and swallow safety in order to  improve swallow safety, work toward diet advancement and decrease aspiration risk.  Patient continues to require skilled intervention due to dysphagia. RECOMMENDATIONS for CONTINUED SPEECH THERAPY:  
YES: Anticipate need for ongoing speech therapy during this hospitalization and at next level of care. ASSESSMENT Patient presents with oropharyngeal dysphagia per modified barium swallow study 3/17. Patient using provale cup as recommended to control volume and tolerating puree meal tray. Functional prep/clearance with puree and No overt s/sx airway compromise.   
 
Recommend continue puree and thin liquids. Liquids via provale cup. Assistance with intake. COMPLIANCE WITH PROGRAM/EXERCISES: Will assess as treatment progresses REHABILITATION POTENTIAL FOR STATED GOALS: Excellent PLAN   
FREQUENCY/DURATION: Continue to follow patient 3 times a week for duration of hospital stay to address above goals. - Recommendations for next treatment session: Next treatment session will address diet tolerance SUBJECTIVE  
assistant present to assist with lunch tray. \"i've always chugged drinks my whole life\" \"get me something I can chug\" \"i'm doing so great I can't stand it\". Still confused but pleasant. Problem List:  (Impairments causing functional limitations): 1. Oropharyngeal dysphagia Orientation:  
Person Pain: Pain Scale 1: Numeric (0 - 10) Pain Intensity 1: 0 OBJECTIVE Patient seen for diet tolerance. Assisted pt with lunch tray for CNA to assess diet tolerance. Patient independently taking sips via provale cup. At times, needs cues to put cup back down in order for it to fill back up. Tolerated pureed meat portion of pureed tray with adequate oral prep and clearance. No overt s/sx airway compromise. No change in vocal quality. INTERDISCIPLINARY COLLABORATION: Registered [place] PRECAUTIONS/ALLERGIES: Citric acid and Lisinopril Tool Used: Dysphagia Outcome and Severity Scale (VICTOR M) Score Comments Normal Diet  [] 7 With no strategies or extra time needed Functional Swallow  [] 6 May have mild oral or pharyngeal delay Mild Dysphagia  [] 5 Which may require one diet consistency restricted Mild-Moderate Dysphagia  [] 4 With 1-2 diet consistencies restricted Moderate Dysphagia  [x] 3 With 2 or more diet consistencies restricted Moderate-Severe Dysphagia  [] 2 With partial PO strategies (trials with ST only) Severe Dysphagia  [] 1 With inability to tolerate any PO safely Score:  Initial:3 Most Recent: 3 (Date 03/18/21 ) Interpretation of Tool: The Dysphagia Outcome and Severity Scale (VICTOR M) is a simple, easy-to-use, 7-point scale developed to systematically rate the functional severity of dysphagia based on objective assessment and make recommendations for diet level, independence level, and type of nutrition. After treatment position/precautions: · Upright in bed · RN notified · Call light within reach Total Treatment Duration:  
Time In: 2836 Time Out: 0129 Jaz Schumacher, [facility] MEDICO DEL [facility] DHIRAJ DACIA HAQ, CCC-SLP

## 2021-03-18 NOTE — PROGRESS NOTES
Infectious Disease Progress Note Today's Date: 3/18/2021 Admit Date: 3/13/2021 Impression: · MSSA bacteremia with MVIE(3/13, 3/15, 3/17), TTE: severe MR/IE ; source uncertain, does have hardware in back but no other prosthetic joints. Evaluated by cardiology, not a candidate for AJITH · Acute nontraumatic L shoulder pain · Fever (103), curve improving · RA; on remicade · MING, Cr improving · Aortic Aneurysm, not repaired per report Plan: · Recheck BC; however uncertain if we can clear this infection with antbx alone; he is not a candidate for operative management · Continue continuous infusion Nafcillin · Overall prognosis is guarded; palliative care is assisting Anti-infectives: · IV Nafcillin (3/14- 
· IV Vancomycin (3/14) · IV Rocephin (3/13) · IV Zithromax (3/13-3/14) Subjective:  
Remain intermittently confused, nursing at bedside Allergies Allergen Reactions  Citric Acid Other (comments)  Lisinopril Other (comments) lightheadedness Review of Systems:  A comprehensive review of systems was negative except for that written in the History of Present Illness. Objective:  
 
Visit Vitals /80 (BP 1 Location: Right arm, BP Patient Position: At rest) Pulse 69 Temp 98.5 °F (36.9 °C) Resp 18 Ht 5' 9\" (1.753 m) Wt 79.3 kg (174 lb 13.2 oz) SpO2 92% BMI 25.82 kg/m² Temp (24hrs), Av.2 °F (36.8 °C), Min:97.9 °F (36.6 °C), Max:98.6 °F (37 °C) Lines:  Peripheral IV:   Intact Physical Exam:   
General:  NAD, laying in bed Eyes:  No icterus or drainage Mouth/Throat: Dry MM Neck: Supple Lungs:   CTAB with normal WOB  
CV:  Regular rate and rhythm, 1/6 early systolic murmur heard best at LUSB Abdomen:   Soft/NDNT Extremities: Pitting edema around ankles bilaterally Skin: No rash, healed scabs Musculoskeletal: No swollen joints, limited/painful ROM of L shoulder, improved since yesterday Psych: Awake, oriented to person and  Data Review: CBC: 
Recent Labs  
  21 
0604 21 
9000 21 
5697 WBC 8.6 9.8 11.9* GRANS 67 77 81* MONOS 11 8 7 EOS 2 1 0* ANEU 5.8 7.5 9.7* ABL 1.6 1.3 1.2 HGB 9.5* 9.5* 9.4* HCT 29.6* 29.8* 29.6* PLT 92* 107* 107* BMP: 
Recent Labs  
  21 
0604 21 
6148 21 
2254 CREA 1.42 1.61* 1.63* BUN 40* 49* 50* * 147* 145  
K 3.3* 3.7 4.1 * 118* 116* CO2 23 22 23 AGAP 7 7 6*  
* 108* 102* LFTS: 
No results for input(s): TBILI, ALT, AP, TP, ALB in the last 72 hours. No lab exists for component: SGOT Microbiology:  
 
All Micro Results Procedure Component Value Units Date/Time CULTURE, BLOOD [[id number]] Collected: 21 3285 Order Status: Completed Specimen: Blood Updated: 21 Special Requests: --     
  LEFT 
HAND 
  
  GRAM STAIN    
  GRAM POS COCCI IN CLUSTERS  
     
   PEDIATRIC BOTTLE     
      
  CRITICAL RESULT NOT CALLED DUE TO PREVIOUS NOTIFICATION OF CRITICAL RESULT WITHIN THE LAST 24 HOURS. Culture result:    
  CULTURE IN PROGRESS,FURTHER UPDATES TO FOLLOW  
     
 BLOOD CULTURE ID PANEL [[id number]]  (Abnormal) Collected: 21 3349 Order Status: Completed Specimen: Blood Updated: 21 5960 Acc. no. from [facility] [id number] Staphylococcus Detected Staphylococcus aureus Detected Comment: RESULTS VERIFIED, PHONED TO AND READ BACK BY 
COLTON OSPINA AT 3527 ON 3/18/2021 
  
  
  mecA (Methicillin-Resistance Genes) NOT DETECTED INTERPRETATION Gram positive cocci in clusters, identified in realtime PCR as probable MSSA. Comment: Recommend discontinuing IV vancomycin starting cefazolin or nafcillin if patient not on beta-lactam therapy. Infectious Diseases Consult recommended in adult patients. THIS TEST DOES NOT REPLACE SENSITIVITY TESTING. CULTURE, BLOOD [[id number]] Collected: 21 0948  Order Status: Completed Specimen: Blood Updated: 03/18/21 0720 Special Requests: --     
  RIGHT 
FOREARM 
  
  GRAM STAIN    
  GRAM POS COCCI IN CLUSTERS  
     
   AEROBIC BOTTLE POSITIVE RESULTS VERIFIED, PHONED TO AND READ BACK BY COLTON OSPINA AT 5158 ON 3/18/2021 Culture result:    
  CULTURE IN PROGRESS,FURTHER UPDATES TO FOLLOW Refer to Blood Culture ID Panel Accession [id number] CULTURE, BLOOD [[id number]]  (Abnormal) Collected: 03/15/21 0352 Order Status: Completed Specimen: Blood Updated: 03/17/21 0750 Special Requests: --     
  LEFT 
ARM 
  
  GRAM STAIN AEROBIC BOTTLE POSITIVE     
      
  GRAM POS COCCI IN CLUSTERS RESULTS VERIFIED, PHONED TO AND READ BACK BY [facility] RN [id number] ON [id number] [place] Culture result: STAPHYLOCOCCUS AUREUS For Susceptibility Refer to Culture [facility] [employer] [id number] CULTURE, BLOOD [[id number]]  (Abnormal) Collected: 03/15/21 1287 Order Status: Completed Specimen: Blood Updated: 03/17/21 4650 Special Requests: --     
  LEFT 
HAND 
  
  GRAM STAIN AEROBIC BOTTLE POSITIVE     
      
  GRAM POS COCCI IN CLUSTERS RESULTS VERIFIED, PHONED TO AND READ BACK BY [facility] RN [id number] ON [id number] [place] Culture result: STAPHYLOCOCCUS AUREUS For Susceptibility Refer to Culture [facility] [employer] [id number] CULTURE, BLOOD [[id number]]  (Abnormal) Collected: 03/13/21 2114 Order Status: Completed Specimen: Blood Updated: 03/16/21 0557 Special Requests: --     
  LEFT Antecubital 
  
  GRAM STAIN    
  GRAM POS COCCI IN CLUSTERS  
     
      
  AEROBIC AND ANAEROBIC BOTTLES  
     
      
  CRITICAL RESULT NOT CALLED DUE TO PREVIOUS NOTIFICATION OF CRITICAL RESULT WITHIN THE LAST 24 HOURS. Culture result: STAPHYLOCOCCUS AUREUS For Susceptibility Refer to Culture [facility] [employer] NO. [id number]  CULTURE, BLOOD [[id number]]  (Abnormal)  (Susceptibility) Collected: 03/13/21 2114 Order Status: Completed Specimen: Blood Updated: 03/16/21 0557 Special Requests: --     
  RIGHT Antecubital 
  
  GRAM STAIN    
  GRAM POS COCCI IN CLUSTERS  
     
      
  AEROBIC AND ANAEROBIC BOTTLES RESULTS VERIFIED, PHONED TO AND READ BACK BY Sushil Dockery RN @ 1120 ON 3/14/21 BY AMM Culture result: STAPHYLOCOCCUS AUREUS Refer to Blood Culture ID Panel Accession [id number] C. DIFFICILE AG & TOXIN A/B [[id number]] Collected: 03/14/21 2219 Order Status: Completed Specimen: Stool Updated: 03/15/21 1303 [address] [place] ANTIGEN    
  C. DIFFICILE GDH ANTIGEN-NEGATIVE  
     
  C. difficile toxin C. DIFFICILE TOXIN-NEGATIVE  
     
  PCR Reflex NOT APPLICABLE INTERPRETATION    
  NEGATIVE FOR TOXIGENIC C. DIFFICILE Clinical Consideration NEGATIVE FOR TOXIGENIC C. DIFFICILE  
     
 BLOOD CULTURE ID PANEL [[id number]]  (Abnormal) Collected: 03/13/21 2114 Order Status: Completed Specimen: Blood Updated: 03/14/21 1120 Acc. no. from [facility] [id number] Staphylococcus Detected Staphylococcus aureus Detected Comment: RESULTS VERIFIED, PHONED TO AND READ BACK BY 
Sushil Dockery RN @ 1120 ON 3/14/21 BY AMM 
  
  
  mecA (Methicillin-Resistance Genes) NOT DETECTED INTERPRETATION Gram positive cocci in clusters, identified in realtime PCR as probable MSSA. Comment: Recommend discontinuing IV vancomycin starting cefazolin or nafcillin if patient not on beta-lactam therapy. Infectious Diseases Consult recommended in adult patients. THIS TEST DOES NOT REPLACE SENSITIVITY TESTING. COVID-19 RAPID TEST [[id number]] Collected: 03/13/21 3427 Order Status: Completed Specimen: Nasopharyngeal Updated: 03/14/21 0038 Specimen source Nasopharyngeal     
  COVID-19 rapid test Not detected   Comment:     
The specimen is NEGATIVE for SARS-CoV-2, the novel coronavirus associated with COVID-19. A negative result does not rule out COVID-19. This test has been authorized by the FDA under an Emergency Use Authorization (EUA) for use by authorized laboratories. Fact sheet for Healthcare Providers: ConventionUpdate.co.nz Fact sheet for Patients: ConventionUpdate.co.nz Methodology: Isothermal Nucleic Acid Amplification COVID-19 RAPID TEST [[id number]] Collected: 03/13/21 1845 Order Status: Canceled Imaging:  
reviewed Signed By: Giovani Siddiqi NP March 18, 2021

## 2021-03-18 NOTE — CONSULTS
Palliative Care Patient: Karen Teague MRN: [id number]  SSN: [id number] YOB: 1940  Age: 79 y.o. Sex: male Date of Request: 3/18/2021 Date of Consult:  3/18/2021 Reason for Consult:  family support and education and goals of care Requesting Physician: Dr. Davon Lobo Assessment/Plan:  
 
Principal Diagnosis:   
Altered Mental Status R41.82 Additional Diagnoses: · Debility, Unspecified  R53.81 
· Frailty  R54 · Counseling, Encounter for Medical Advice  Z71.9 
· Encounter for Palliative Care  Z51.5 Palliative Performance Scale (PPS): 
  
 
Medical Decision Making:  
Reviewed and summarized labs and imaging. Spoke with pt daughter via phone. She is in the process of placing her mother in memory care unit. Updated on current condition and concerns given severity of infection and lack of surgical options as pt not likely to survive surgery. Explained that abx alone would not fully treat infection and BC have remained positive despite ongoing abx. Daughter was tearful but expressed her understanding. She needs time to process multiple events going on for her. Will continue to follow. Will discuss findings with members of the interdisciplinary team.   
 
Thank you for this referral.    
 
 
Subjective:  
 
History obtained from:  Family and Chart Chief Complaint: altered mental status History of Present Illness: 79 y. o. male with medical history of RA on bi-weekly infusion, CAD, idiopathic thrombocytopenia, hypothyroidism, AAA and HTN presented with daughter at bedside as patient is confused cannot give any information. According to daughter, pt started feeling bad since the second Covid vaccine late February. On 3/10, he was complaining of left shoulder pain associated with confusion and mumbling speech and was taken to [facility]. Daughter was concerned that he might be having a stroke.  However he was diagnosed with herpes zoster and was sent home.  He reportedly had an x-ray done at  of left shoulder with no fracture noted. 
 Patient's symptoms persisted with decreased p.o. intake with associated 5 lbs weight loss. He developed a fever morning of 3/13/2021, daughter gave him a dose of Tylenol and brought pt to the ER and was discharged home. Daughter noticed that patient was having chills and again having T103, hence brought patient back to the emergency room. Patient at baseline was independent with ADL's and actually can care of his wife who has dementia. On admission, patient was only mumbling with difficult to understand speech but did complain of pain on moving the LUE which was in flexed position. He was able to move all other extremities on his own except for left upper extremity but did not really follow verbal commands. ED workup includes WBC 13.2, hemoglobin 9.9, creatinine 1.25, GFR of 59. Rapid Covid test negative. CT head no acute findings. Chest x-ray shows low lung volumes with basilar consolidation. 
  
Pt was given Ceftriaxone and Azithromycin in ED. He is admitted for acute metabolic encephalopathy, sepsis secondary to pneumonia. Blood cultures grew MSSA. ID has been consulted. Antibiotics were changed to Nafcillin, f/u BCx. MRI shoulder--hold d/t mentation, anticipate 4 weeks abx course d/t immunosuppressives w/o obvious source. SLP recommended pureed diet. MBS completed 3/17. MRI brain pending. TTE 3/16 w/ EF 55-60%; mod-severe MR with mitral valve vegetation suggesting endocarditis. Cardiology has been consulted. Advance Directive: Yes Code Status:  DNR Health Care Power of : pt daughter is decision maker Past Medical History:  
Diagnosis Date  Aneurysm ([address]  ASCAD - of the native vessel 6/13/2016  CAD (coronary artery disease) 3 stents  had M.I. 2001  Cancer ([facility] melanoma  Chronic pain   
 back  Coagulation disorder ([facility]   
 pt denies this  Crohn's colitis (Dr Fernanda Do) 8/1/2012  crohns's disease   
 stable  Disorder of bone and cartilage, unspecified 2014  Dyslipidemia 2016  GERD (gastroesophageal reflux disease)   
 under control with med  History of skin cancer   
 ear and right forearm  Hypercholesterolemia  Hypertension  MI ([facility]ocardial infarction) [facility]   Obesity 30.5 bmi  
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   
 on Kardia RS  
 PVC (premature ventricular contraction)  RA (rheumatoid arthritis) (HCC)   
 takes remicaid  Status Post PTCA/stent 2016  Thrombocytopenia, unspecified ([address] 4/10/2014  Thyroid disease   
 hypo. under control with med  Trochanteric bursitis 2013  Unspecified sleep apnea   
 not using c-pap Past Surgical History:  
Procedure Laterality Date  HX CATARACT REMOVAL    
 gadiel iol  HX COLONOSCOPY  Dec 2003  HX HEART CATHETERIZATION   and   
 1 stent  then 2 stents  per pt  HX HEENT    
 floater laser surgery  HX HERNIA REPAIR    
 right  HX LUMBAR FUSION  -2011 X 2  
 HX ORTHOPAEDIC    
 back surgery x 2  
 HX OTHER SURGICAL  2017  
 back surgery, Dr Vitaly Pal  HX TONSILLECTOMY [facility]    VT SINUS SURGERY PROC UNLISTED Family History Problem Relation Age of Onset  Heart Disease Mother  Hypertension Mother  Heart Disease Father  Hypertension Father  Other Brother AAA  Gall Bladder Disease Brother Social History Tobacco Use  Smoking status: Former Smoker Packs/day: 0.50 Years: 20.00 Pack years: 10.00 Quit date: 1979 Years since quittin.2  Smokeless tobacco: Never Used Substance Use Topics  Alcohol use: No  
 
Prior to Admission medications Medication Sig Start Date End Date Taking?  Authorizing Provider  
levothyroxine (SYNTHROID) 100 mcg tablet Take 1 Tab by mouth Daily (before breakfast). 2/17/21   Ramón Pressley MD  
losartan-hydroCHLOROthiazide [facility]) 100-12.5 mg per tablet Take 1 Tab by mouth daily (with dinner). 2/17/21   Ramón Pressley MD  
atorvastatin (LIPITOR) 40 mg tablet TAKE 1 TABLET EVERY EVENING 12/31/20   Ramón Pressley MD  
leflunomide (ARAVA) 20 mg tablet One daily 9/14/20   Niya Mosquera MD  
diclofenac (VOLTAREN) 1 % gel Apply 4 g to affected area four (4) times daily. 2/11/20   Niya Mosquera MD  
nitroglycerin (NITROSTAT) 0.4 mg SL tablet 1 Tab by SubLINGual route every five (5) minutes as needed for Chest Pain. 9/9/19   Ramón Pressley MD  
fluticasone (FLONASE) 50 mcg/actuation nasal spray 2 Sprays by Both Nostrils route as needed for Rhinitis. 1/3/19   Ramón Pressley MD  
INFLIXIMAB (REMICADE IV) by IntraVENous route. Every 8 weeks    Provider, Historical  
aspirin delayed-release 81 mg tablet Take 81 mg by mouth nightly. Provider, Historical  
 
 
Allergies Allergen Reactions  Citric Acid Other (comments)  Lisinopril Other (comments) lightheadedness Review of systems unable to obtain due to mentation. Objective:  
 
Visit Vitals /80 (BP 1 Location: Right arm, BP Patient Position: At rest) Pulse 69 Temp 98.5 °F (36.9 °C) Resp 18 Ht 5' 9\" (1.753 m) Wt 174 lb 13.2 oz (79.3 kg) SpO2 92% BMI 25.82 kg/m² Physical Exam: 
 
General:   lethargic Eyes:  Conjunctivae/corneas clear Nose: Nares normal. Septum midline. Neck: Supple, symmetrical, trachea midline, no JVD Lungs:   Clear to auscultation bilaterally, unlabored Heart:  Regular rate and rhythm, 3/6 ezio Abdomen:   Soft, non-tender, non-distended. Positive bowel sounds Extremities: Normal, atraumatic, no cyanosis or edema Skin: Skin color, texture, turgor normal. No rash or lesions. Neurologic: Moves all extremities Psych: lethargic  Assessment:  
 
Hospital Problems  Date Reviewed: 2/22/2021 Codes Class Noted POA MING (acute kidney injury) ([address] ICD-10-CM: N17.9 ICD-9-CM: 584.9  3/17/2021 No  
   
 Endocarditis of mitral valve ICD-10-CM: I05.8 ICD-9-CM: 394.9  3/17/2021 Unknown * (Principal) Sepsis ([address] ICD-10-CM: A41.9 ICD-9-CM: 038.9, 995.91  3/14/2021 Unknown Acute metabolic encephalopathy REGIS-40-NU: G93.41 
ICD-9-CM: 348.31  3/14/2021 Unknown PNA (pneumonia) ICD-10-CM: J18.9 ICD-9-CM: 066  3/14/2021 Unknown Suspected COVID-19 virus infection ICD-10-CM: Q38.200 ICD-9-CM: V01.79  3/14/2021 Unknown Bacteremia ICD-10-CM: R78.81 ICD-9-CM: 790.7  3/14/2021 Unknown  
   
 AAA (abdominal aortic aneurysm) (HCC) ICD-10-CM: I71.4 ICD-9-CM: 441.4  2/6/2013 Yes Overview Addendum 2/20/2020 11:27 AM by Caitlin, Cortney Hirsch NP Followed by Vascular Surgery Rheumatoid arthritis ([address] ICD-10-CM: M06.9 ICD-9-CM: 714.0  8/1/2012 Yes Hypothyroidism ICD-10-CM: E03.9 ICD-9-CM: 244.9  8/1/2012 Yes Mixed hyperlipidemia ICD-10-CM: E78.2 ICD-9-CM: 272.2  8/1/2012 Yes Chronic idiopathic thrombocytopenia (HCC) ICD-10-CM: D69.3 ICD-9-CM: 287.31  8/1/2012 Yes Overview Signed 2/6/2013  6:21 PM by Toñito Armendariz  
  S/P W/U Dr Gurjit Garcia Signed By: Jaydon Silveira MD   
 March 18, 2021

## 2021-03-18 NOTE — PROGRESS NOTES
Progress Note Patient: Alonso Rich MRN: [id number]  SSN: [id number] YOB: 1940  Age: 79 y.o. Sex: male Admit Date: 3/13/2021 LOS: 4 days Assessment and Plan:  
79 y. o. male with medical history of RA on bi-weekly infusion, CAD, idiopathic thrombocytopenia, hypothyroidism, AAA and HTN presented with daughter at bedside as patient was confused Sepsis Bacteremia Mitral valve endocarditis Abx: Vanc/Zosyn (3/14-3/14) changed to Nafcillin (3/14-. ..) BCx 3/13: grew MSSA Repeat BCx 3/15: GPC 2/2 Repeat BCx 3/17 GPC 
TTE 3/16 w/ EF 55-60%; mod-severe MR with mitral valve vegetation suggesting endocarditis ID on board--Cont with Nafcillin, consult cardiology for AJITH; valve repair vs palliative. MVIE carries high risk of mortality. Cardiology consulted--spoke with daughter in regards to AJITH. Daughter decided no surgery and opted for aggressive IV abx. F/u BCx, ID and card recs Palliative care consulted 
  
Acute metabolic encephalopathy/delirium, improved but waxing and waning 
-Most likely AMS 2/2 above sepsis vs septic emboli given new findings of MV endocarditis. CT head no acute findings. On admission, mumbles, difficult to understand, does not follow commands Avoid sedating meds Fall precautions Delirium precautions PT/OT 
SLP to eval and treat--MBS done and recommended pureed diet with 1:1 assistance with meals Infection management as stated Vit B12, folate, ammonia, RPR, HIV, TSH/Free T4--unremarkable Considering MRI brain 
  
Left shoulder pain Had zoster over the posterior aspect of the left shoulder region few days ago. -Shoulder x-ray--no acute findings, only degenerative changes.   
-MRI shoulder pending--holding until improvement in mentation 
  
MING Avoid nephrotoxic meds Accurate I&O's 
Holding home diuretics/ACE-I/ARB MIVF 
  
Poor p.o. intake 
-as per daughter Lost about 5 pounds unintentionally.   
-Nutrition consult.   
-mIVF: Changed to D5W d/t hypernatremia 
-[place] to [place] [place] ok for pureed diet for now 
  
Hypernatremia D/t poor po intake IVF: D5W BMP daily 
  
Rheumatoid arthritis Holding Leflunomide and Remicade 
  
Coronary disease:  
Cont home ASA, statin, ARB 
  
Hypothyroidism Cont home synthroid 
  
Abdominal aortic aneurysm Cont home statin 
  
History of idiopathic thrombocytopenia Monitor CBC 
  
Hypertension  
Normotensive 
  
DVT Prophylaxis: Lovenox SQ Subjective:  
79 y. o. male with medical history of RA on bi-weekly infusion, CAD, idiopathic thrombocytopenia, hypothyroidism, AAA and HTN presented with daughter at bedside as patient was confused cannot give any information. According to daughter, pt started feeling bad since the second Covid vaccine late February. On 3/10, he was complaining of left shoulder pain associated with confusion and mumbling speech and was taken to . Daughter was concerned that he might be having a stroke. However he was diagnosed with herpes zoster and was sent home.  He reportedly had an x-ray done at  of left shoulder with no fracture noted. 
 Patient's symptoms persisted with decreased p.o. intake with associated 5 lbs weight loss. He developed a fever morning of 3/13/2021, daughter gave him a dose of Tylenol and brought pt to the ER and was discharged home. Daughter noticed that patient was having chills and again having T103, hence brought patient back to the emergency room. Patient at baseline was independent with ADL's and actually can care of his wife who has dementia. On admission, patient was only mumbling with difficult to understand speech but did complain of pain on moving the LUE which was in flexed position. He was able to move all other extremities on his own except for left upper extremity but did not really follow verbal commands. ED workup includes WBC 13.2, hemoglobin 9.9, creatinine 1.25, GFR of 59. Rapid Covid test negative.  CT head no acute findings. Chest x-ray shows low lung volumes with basilar consolidation. Pt was given Ceftriaxone and Azithromycin in ED. He is admitted for acute metabolic encephalopathy, sepsis secondary to pneumonia. Blood cultures grew MSSA. ID has been consulted. Antibiotics were changed to Nafcillin, f/u BCx. MRI shoulder--hold d/t mentation, anticipate 4 weeks abx course d/t immunosuppressives w/o obvious source. SLP recommended pureed diet. MBS completed 3/17. MRI brain pending. TTE 3/16 w/ EF 55-60%; mod-severe MR with mitral valve vegetation suggesting endocarditis. Patient seen and examined at bedside. Lethargic, arousable, confused, unable to obtain history. Objective:  
 
Vitals:  
 03/18/21 0619 03/18/21 0728 03/18/21 1118 03/18/21 1524 BP:  130/80 108/68 114/75 Pulse:  69 67 69 Resp:  18 18 17 Temp:  98.5 °F (36.9 °C) 98.7 °F (37.1 °C) 97.9 °F (36.6 °C) SpO2:  92% 94% 92% Weight: 79.3 kg (174 lb 13.2 oz) Height:      
  
 
Intake and Output: 
Current Shift: 03/18 0701 - 03/18 1900 In: 218 [P.O.:218] Out: - Last three shifts: 03/16 1901 - 03/18 0700 In: 560 [P.O.:560] Out: -  
 
ROS Unable to obtain due to patients condition Physical Exam:  
General: Lethargic, NAD HEENT: NC/AT, EOM are intact Neck: supple, no JVD Cardiovascular: RRR, S1, S2 
Respiratory: Lungs are clear, no wheezes or rales Abdomen: Soft, NT, ND Back: No CVA tenderness, no paraspinal tenderness Extremities: LE without pedal edema, no erythema Neuro: CN are intact, no focal deficits Skin: no rash or ulcers Psych: good mood and affect Lab/Data Review: 
I have personally reviewed patients laboratory data showing Recent Results (from the past 24 hour(s)) CBC WITH AUTOMATED DIFF Collection Time: 03/18/21  6:04 AM  
Result Value Ref Range WBC 8.6 4.3 - 11.1 K/uL  
 RBC 3.07 (L) 4.23 - 5.6 M/uL HGB 9.5 (L) 13.6 - 17.2 g/dL HCT 29.6 (L) 41.1 - 50.3 %  MCV 96.4 79.6 - 97.8 FL  
 MCH 30.9 26.1 - 32.9 PG  
 MCHC 32.1 31.4 - 35.0 g/dL  
 RDW 14.3 11.9 - 14.6 % PLATELET 92 (L) 794 - 450 K/uL MPV 10.5 9.4 - 12.3 FL ABSOLUTE NRBC 0.00 0.0 - 0.2 K/uL  
 DF AUTOMATED NEUTROPHILS 67 43 - 78 % LYMPHOCYTES 19 13 - 44 % MONOCYTES 11 4.0 - 12.0 % EOSINOPHILS 2 0.5 - 7.8 % BASOPHILS 0 0.0 - 2.0 % IMMATURE GRANULOCYTES 1 0.0 - 5.0 %  
 ABS. NEUTROPHILS 5.8 1.7 - 8.2 K/UL  
 ABS. LYMPHOCYTES 1.6 0.5 - 4.6 K/UL  
 ABS. MONOCYTES 0.9 0.1 - 1.3 K/UL  
 ABS. EOSINOPHILS 0.2 0.0 - 0.8 K/UL  
 ABS. BASOPHILS 0.0 0.0 - 0.2 K/UL  
 ABS. IMM. GRANS. 0.1 0.0 - 0.5 K/UL METABOLIC PANEL, BASIC Collection Time: 03/18/21  6:04 AM  
Result Value Ref Range Sodium 146 (H) 138 - 145 mmol/L Potassium 3.3 (L) 3.5 - 5.1 mmol/L Chloride 116 (H) 98 - 107 mmol/L  
 CO2 23 21 - 32 mmol/L Anion gap 7 7 - 16 mmol/L Glucose 134 (H) 65 - 100 mg/dL BUN 40 (H) 8 - 23 MG/DL Creatinine 1.42 0.8 - 1.5 MG/DL  
 GFR est AA >60 >60 ml/min/1.73m2 GFR est non-AA 51 (L) >60 ml/min/1.73m2 Calcium 7.6 (L) 8.3 - 10.4 MG/DL All Micro Results Procedure Component Value Units Date/Time CULTURE, BLOOD [[id number]] Collected: 03/18/21 1425 Order Status: Completed Specimen: Blood Updated: 03/18/21 1443 CULTURE, BLOOD [[id number]] Collected: 03/18/21 1425 Order Status: Completed Specimen: Blood Updated: 03/18/21 1443 CULTURE, BLOOD [[id number]] Collected: 03/17/21 1625 Order Status: Completed Specimen: Blood Updated: 03/18/21 0919 Special Requests: --     
  LEFT 
HAND 
  
  GRAM STAIN    
  GRAM POS COCCI IN CLUSTERS  
     
   PEDIATRIC BOTTLE     
      
  CRITICAL RESULT NOT CALLED DUE TO PREVIOUS NOTIFICATION OF CRITICAL RESULT WITHIN THE LAST 24 HOURS. Culture result:    
  CULTURE IN PROGRESS,FURTHER UPDATES TO FOLLOW  
     
 BLOOD CULTURE ID PANEL [[id number]]  (Abnormal) Collected: 03/17/21 7749 Order Status: Completed Specimen: Blood Updated: 03/18/21 0440 Acc. no. from [employer] [id number] Staphylococcus Detected Staphylococcus aureus Detected Comment: RESULTS VERIFIED, PHONED TO AND READ BACK BY 
COLTON OSPINA AT 6038 ON 3/18/2021 
  
  
  mecA (Methicillin-Resistance Genes) NOT DETECTED INTERPRETATION Gram positive cocci in clusters, identified in realtime PCR as probable MSSA. Comment: Recommend discontinuing IV vancomycin starting cefazolin or nafcillin if patient not on beta-lactam therapy. Infectious Diseases Consult recommended in adult patients. THIS TEST DOES NOT REPLACE SENSITIVITY TESTING. CULTURE, BLOOD [[id number]] Collected: 03/17/21 6609 Order Status: Completed Specimen: Blood Updated: 03/18/21 0720 Special Requests: --     
  RIGHT 
FOREARM 
  
  GRAM STAIN    
  GRAM POS COCCI IN CLUSTERS  
     
   AEROBIC BOTTLE POSITIVE RESULTS VERIFIED, PHONED TO AND READ BACK BY COLTON OSPINA AT 3393 ON 3/18/2021 Culture result:    
  CULTURE IN PROGRESS,FURTHER UPDATES TO FOLLOW Refer to Blood Culture ID Panel Accession [id number] CULTURE, BLOOD [[id number]]  (Abnormal) Collected: 03/15/21 2518 Order Status: Completed Specimen: Blood Updated: 03/17/21 0750 Special Requests: --     
  LEFT 
ARM 
  
  GRAM STAIN AEROBIC BOTTLE POSITIVE     
      
  GRAM POS COCCI IN CLUSTERS RESULTS VERIFIED, PHONED TO AND READ BACK BY Tamela Alves RN [id number] ON [id number] [place] Culture result: STAPHYLOCOCCUS AUREUS For Susceptibility Refer to Culture [address] [id number] CULTURE, BLOOD [[id number]]  (Abnormal) Collected: 03/15/21 7383 Order Status: Completed Specimen: Blood Updated: 03/17/21 0660 Special Requests: --     
  LEFT 
HAND 
  
  GRAM STAIN AEROBIC BOTTLE POSITIVE     
      
  GRAM POS COCCI IN CLUSTERS RESULTS VERIFIED, PHONED TO AND READ BACK BY [facility] Joselyn CASTRO [id number] ON [id number] [place]   Culture result: STAPHYLOCOCCUS AUREUS For Susceptibility Refer to Culture [facility] [id number] CULTURE, BLOOD [[id number]]  (Abnormal) Collected: 03/13/21 2114 Order Status: Completed Specimen: Blood Updated: 03/16/21 0557 Special Requests: --     
  LEFT Antecubital 
  
  GRAM STAIN    
  GRAM POS COCCI IN CLUSTERS  
     
      
  AEROBIC AND ANAEROBIC BOTTLES  
     
      
  CRITICAL RESULT NOT CALLED DUE TO PREVIOUS NOTIFICATION OF CRITICAL RESULT WITHIN THE LAST 24 HOURS. Culture result: STAPHYLOCOCCUS AUREUS For Susceptibility Refer to Culture [facility] NO. [id number] CULTURE, BLOOD [[id number]]  (Abnormal)  (Susceptibility) Collected: 03/13/21 2114 Order Status: Completed Specimen: Blood Updated: 03/16/21 0557 Special Requests: --     
  RIGHT Antecubital 
  
  GRAM STAIN    
  GRAM POS COCCI IN CLUSTERS  
     
      
  AEROBIC AND ANAEROBIC BOTTLES RESULTS VERIFIED, PHONED TO AND READ BACK BY Johnathon Shea RN @ 1120 ON 3/14/21 BY AMM Culture result: STAPHYLOCOCCUS AUREUS Refer to Blood Culture ID Panel Accession [id number] C. DIFFICILE AG & TOXIN A/B [[id number]] Collected: 03/14/21 2219 Order Status: Completed Specimen: Stool Updated: 03/15/21 1303 [address] ANTIGEN    
  C. DIFFICILE GDH ANTIGEN-NEGATIVE  
     
  C. difficile toxin C. DIFFICILE TOXIN-NEGATIVE  
     
  PCR Reflex NOT APPLICABLE INTERPRETATION    
  NEGATIVE FOR TOXIGENIC C. DIFFICILE Clinical Consideration NEGATIVE FOR TOXIGENIC C. DIFFICILE  
     
 BLOOD CULTURE ID PANEL [[id number]]  (Abnormal) Collected: 03/13/21 2114 Order Status: Completed Specimen: Blood Updated: 03/14/21 1120 Acc. no. from [facility] [id number] Staphylococcus Detected Staphylococcus aureus Detected   Comment: RESULTS VERIFIED, PHONED TO AND READ BACK BY 
Johnathon Shea RN @ 1120 ON 3/14/21 BY AMM 
  
  
  mecA (Methicillin-Resistance Genes) NOT DETECTED INTERPRETATION Gram positive cocci in clusters, identified in realtime PCR as probable MSSA. Comment: Recommend discontinuing IV vancomycin starting cefazolin or nafcillin if patient not on beta-lactam therapy. Infectious Diseases Consult recommended in adult patients. THIS TEST DOES NOT REPLACE SENSITIVITY TESTING. COVID-19 RAPID TEST [[id number]] Collected: 03/13/21 2357 Order Status: Completed Specimen: Nasopharyngeal Updated: 03/14/21 0038 Specimen source Nasopharyngeal     
  COVID-19 rapid test Not detected Comment:     
The specimen is NEGATIVE for SARS-CoV-2, the novel coronavirus associated with COVID-19. A negative result does not rule out COVID-19. This test has been authorized by the FDA under an Emergency Use Authorization (EUA) for use by authorized laboratories. Fact sheet for Healthcare Providers: [employer] Fact sheet for Patients: [employer]Launch[employer]nz Methodology: Isothermal Nucleic Acid Amplification COVID-19 RAPID TEST [[id number]] Collected: 03/13/21 2357 Order Status: Canceled Image: 
I have personally reviewed patients imaging showing XR SWALLOW FUNC VIDEO Final Result Laryngeal penetration without tracheal aspiration  Please see speech  
pathologist report for further details. XR SHOULDER LT AP/LAT MIN 2 V Final Result Mild degenerative change. No acute findings. CT HEAD WO CONT Final Result 1. No CT evidence of acute intracranial process. XR CHEST PORT Final Result Low lung volumes with basilar consolidation. Hospital problems Principal Problem: 
  Sepsis ([facility] [address] (3/14/2021) Active Problems: 
  Rheumatoid arthritis ([address] (8/1/2012) Hypothyroidism (8/1/2012) Mixed hyperlipidemia (8/1/2012) Chronic idiopathic thrombocytopenia ([address] (8/1/2012)     Overview: S/P W/U Dr Genesis Spivey AAA (abdominal aortic aneurysm) ([facility] [address] (2/6/2013) Overview: Followed by Vascular Surgery Acute metabolic encephalopathy (0/84/8736) PNA (pneumonia) (3/14/2021) Suspected COVID-19 virus infection (3/14/2021) Bacteremia (3/14/2021) MING (acute kidney injury) ([facility] (3/17/2021) Endocarditis of mitral valve (3/17/2021) I have reviewed, updated, and verified this note's content and spent 38 minutes of my 42 minutes visit performing counseling and coordination of care regarding medical management. Signed By: Priyank Donohue MD   
 March 18, 2021

## 2021-03-18 NOTE — PROGRESS NOTES
Pt resting in bed with eyes closed. No distress noted at this time. Call light in reach, will monitor.

## 2021-03-18 NOTE — PROGRESS NOTES
Patient in bed resting quietly with eyes closed. Respirations even and unlabored on room air. Safety measures in place. No needs expressed at this time.  Report given to Cassy Sampson

## 2021-03-18 NOTE — PROGRESS NOTES
Pt resting in bed. Pt alert oriented to person at this time and knows he is in [place]. Pt denies pain or distress at this time.  SCDs in place. Pt encouraged to call for assistance if needed call light in reach, will monitor.

## 2021-03-18 NOTE — PROGRESS NOTES
Care assumed by this writer, patient appears comfortable and in no acute distress.   Will continue to monitor and assist.

## 2021-03-18 NOTE — PROGRESS NOTES
am 
3/18/2021 7:07 AM 
 
Admit Date: 3/13/2021 Admit Diagnosis: Sepsis ([facility] [address] [A41.9]; Acute metabolic encephalopathy [Y76.86] Subjective:  
 Patient : chronically ill appearing. Presenting cardiac complaints are unchanged has SBE Objective:  
  
Visit Vitals /66 (BP 1 Location: Right arm, BP Patient Position: At rest) Pulse 67 Temp 98 °F (36.7 °C) Resp 20 Ht 5' 9\" (1.753 m) Wt 174 lb 13.2 oz (79.3 kg) SpO2 95% BMI 25.82 kg/m² ROS:  General ROS: negative for - chills Hematological and Lymphatic ROS: negative for - blood clots or jaundice Respiratory ROS: no cough, shortness of breath, or wheezing Cardiovascular ROS: no chest pain or dyspnea on exertion Gastrointestinal ROS: no abdominal pain, change in bowel habits, or black or bloody stools Neurological ROS: no TIA or stroke symptoms Physical Exam: 
 
 
Physical Examination: General appearance - Appearance: ill-appearing. Neck/lymph - supple, no significant adenopathy Chest/CV - clear to auscultation, no wheezes, rales or rhonchi, symmetric air entry Heart - normal rate, regular rhythm, normal S1, S2, no murmurs, rubs, clicks or gallops Abdomen/GI - soft, nontender, nondistended, no masses or organomegaly Musculoskeletal - no joint tenderness, deformity or swelling Extremities - peripheral pulses normal, no pedal edema, no clubbing or cyanosis Skin - normal coloration and turgor, no rashes, no suspicious skin lesions noted Current Facility-Administered Medications Medication Dose Route Frequency  dextrose 5% infusion  100 mL/hr IntraVENous CONTINUOUS  
 guaiFENesin ER (MUCINEX) tablet 600 mg  600 mg Oral Q12H  aspirin delayed-release tablet 81 mg  81 mg Oral QHS  atorvastatin (LIPITOR) tablet 40 mg  40 mg Oral QHS  levothyroxine (SYNTHROID) tablet 100 mcg  100 mcg Oral ACB  nitroglycerin (NITROSTAT) tablet 0.4 mg  0.4 mg SubLINGual Q5MIN PRN  
 sodium chloride (NS) flush 5-40 mL  5-40 mL IntraVENous Q8H  
 sodium chloride (NS) flush 5-40 mL  5-40 mL IntraVENous PRN  
 acetaminophen (TYLENOL) tablet 650 mg  650 mg Oral Q6H PRN Or  
 acetaminophen (TYLENOL) suppository 650 mg  650 mg Rectal Q6H PRN  polyethylene glycol (MIRALAX) packet 17 g  17 g Oral DAILY PRN  promethazine (PHENERGAN) tablet 12.5 mg  12.5 mg Oral Q6H PRN Or  
 ondansetron (ZOFRAN) injection 4 mg  4 mg IntraVENous Q6H PRN  
 enoxaparin (LOVENOX) injection 40 mg  40 mg SubCUTAneous DAILY  famotidine (PEPCID) tablet 20 mg  20 mg Oral DAILY  morphine injection 0.5 mg  0.5 mg IntraVENous Q6H PRN  
 nafcillin (NALLPEN) 12 g in 0.9% sodium chloride 1,000 mL continuous infusion  12 g IntraVENous Q24H Data Review:  
CMP:  
Lab Results Component Value Date/Time  (H) 03/18/2021 06:04 AM  
 K 3.3 (L) 03/18/2021 06:04 AM  
  (H) 03/18/2021 06:04 AM  
 CO2 23 03/18/2021 06:04 AM  
 AGAP 7 03/18/2021 06:04 AM  
  (H) 03/18/2021 06:04 AM  
 BUN 40 (H) 03/18/2021 06:04 AM  
 CREA 1.42 03/18/2021 06:04 AM  
 GFRAA >60 03/18/2021 06:04 AM  
 GFRNA 51 (L) 03/18/2021 06:04 AM  
 CA 7.6 (L) 03/18/2021 06:04 AM  
 
CBC:  
Lab Results Component Value Date/Time WBC 8.6 03/18/2021 06:04 AM  
 HGB 9.5 (L) 03/18/2021 06:04 AM  
 HCT 29.6 (L) 03/18/2021 06:04 AM  
 PLT 92 (L) 03/18/2021 06:04 AM  
  
 
TELEMETRY: nsr Assessment/Plan:  
 
Principal Problem: 
  Sepsis ([facility] (3/14/2021) SBE abx conservative therapy. No AJITH no surgery Active Problems: 
  Rheumatoid arthritis ([facility] [address] (8/1/2012) Hypothyroidism (8/1/2012) The current medical regimen is effective;  continue present plan and medications. Mixed hyperlipidemia (8/1/2012) The current medical regimen is effective;  continue present plan and medications. Chronic idiopathic thrombocytopenia ([facility] [address] (8/1/2012) Monitor AAA (abdominal aortic aneurysm) ([facility] [address] (2/6/2013)   Acute metabolic encephalopathy (3/14/2021) PNA (pneumonia) (3/14/2021) On abx. continue Suspected COVID-19 virus infection (3/14/2021) Bacteremia (3/14/2021) MING (acute kidney injury) ([facility] [address] (3/17/2021) Endocarditis of mitral valve (3/17/2021) Conservative management. No surgery no [place]. Abx. If worsens then hospice Call if needed Miguelangel Ordonez MD

## 2021-03-19 PROBLEM — E44.1 MILD PROTEIN-CALORIE MALNUTRITION (HCC): Status: ACTIVE | Noted: 2021-01-01

## 2021-03-19 NOTE — PROGRESS NOTES
Problem: Falls - Risk of 
Goal: *Absence of Falls Description: Document Thompson Sol Fall Risk and appropriate interventions in the flowsheet. Outcome: Progressing Towards Goal 
Note: Fall Risk Interventions: 
Mobility Interventions: Assess mobility with egress test, Patient to call before getting OOB Mentation Interventions: Adequate sleep, hydration, pain control Medication Interventions: Evaluate medications/consider consulting pharmacy Elimination Interventions: Bed/chair exit alarm History of Falls Interventions: Evaluate medications/consider consulting pharmacy Problem: Patient Education: Go to Patient Education Activity Goal: Patient/Family Education Outcome: Progressing Towards Goal

## 2021-03-19 NOTE — PROGRESS NOTES
ACUTE PHYSICAL THERAPY GOALS: 
(Developed with and agreed upon by patient and/or caregiver.) STG: 
(1.)Mr. Shea Serrano will move from supine to sit and sit to supine , scoot up and down and roll side to side with MODERATE ASSIST within 3 treatment day(s). GOAL MET 3/17/2021 
 
(2.)Mr. Shea Serrano will transfer from bed to chair and chair to bed with MINIMAL ASSIST of 1 using the least restrictive device within 3 treatment day(s). (3.)Mr. Shea Serrano will ambulate with MINIMAL ASSIST for 25+ feet with the least restrictive device within 3 treatment day(s).  
  
LTG: 
(1.)Mr. Shea Serrano will move from supine to sit and sit to supine , scoot up and down and roll side to side in bed with CG to MINIMAL ASSIST within 7 treatment day(s). (2.)Mr. Shea Serrano will transfer from bed to chair and chair to bed with CONTACT GUARD ASSIST using the least restrictive device within 7 treatment day(s). (3.)Mr. Shea Serrano will ambulate with CONTACT GUARD ASSIST for 150+ feet with the least restrictive device within 7 treatment day(s). PHYSICAL THERAPY: Daily Note and AM Treatment Day # 3 Radha Wiggins is a 79 y.o. male PRIMARY DIAGNOSIS: Sepsis ([facility] [address] Sepsis ([facility] [address] [A41.9] Acute metabolic encephalopathy [A35.31] ASSESSMENT:  
 
REHAB RECOMMENDATIONS: CURRENT LEVEL OF FUNCTION: 
(Most Recently Demonstrated) Recommendation to date pending progress: 
Setting:  Short-term Rehab Equipment:  To Be Determined Bed Mobility:  Moderate Assistance to max A x2 Sit to Stand:  Not tested Transfers:  Not tested Gait/Mobility:  Not tested ASSESSMENT: 
Mr. Shea Serrano presents pleasantly confused. Continues to need mod-max A x2 for bed mobility/transfer to sitting. Poor seated balance with strong posterior/right trunk lean, poor trunk control, and right head tilt.  Practiced seated A/P and lateral weight shifts, supporting on left elbow, LE weight bearing, and seated functional activities to address posture, positioning, trunk control, proprioception. Tolerated extended sitting activities however fatigues toward the end, therefore standing not attempted today. Returned to supine and repositioned with mod-max A x2. Slow progress, limited by significant weakness and cognition. Will continue with acute PT per plan of care. Needs rehab. SUBJECTIVE:  
Mr. Abigail Smith states, \"I'm going to sit here while you work\" SOCIAL HISTORY/ LIVING ENVIRONMENT:  Mr. Abigail Smith lives with his wife who has dementia, whom he cares for. He is independent. Home Environment: Private residence One/Two Story Residence: One story Living Alone: No 
Support Systems: Child(biju) OBJECTIVE:  
 
PAIN: VITAL SIGNS: LINES/DRAINS:  
Pre Treatment: Pain Screen Pain Scale 1: FLACC Pain Intensity 1: 0 Post Treatment: 0 Vital Signs O2 Device: Room air IV 
O2 Device: Room air MOBILITY: I Mod I S SBA CGA Min Mod Max Total  NT x2 Comments:  
Bed Mobility Rolling [] [] [] [] [] [] [] [x] [] [] [x] Supine to Sit [] [] [] [] [] [] [x] [x] [] [] [x] Scooting [] [] [] [] [] [] [] [x] [] [] [x] Sit to Supine [] [] [] [] [] [] [] [x] [] [] [x] Transfers Sit to Stand [] [] [] [] [] [] [] [] [] [x] [] Bed to Chair [] [] [] [] [] [] [] [] [] [x] [] Stand to Sit [] [] [] [] [] [] [] [] [] [x] [] I=Independent, Mod I=Modified Independent, S=Supervision, SBA=Standby Assistance, CGA=Contact Guard Assistance,  
Min=Minimal Assistance, Mod=Moderate Assistance, Max=Maximal Assistance, Total=Total Assistance, NT=Not Tested BALANCE: Good Fair+ Fair Fair- Poor NT Comments Sitting Static [] [] [] [x] [x] [] Sitting Dynamic [] [] [] [] [x] [] Standing Static [] [] [] [] [] [x] Standing Dynamic [] [] [] [] [] [x] GAIT: I Mod I S SBA CGA Min Mod Max Total  NT x2 Comments:  
Level of Assistance [] [] [] [] [] [] [] [] [] [x] [] Distance NA   
DME N/A Gait Quality NA Weightbearing Status N/A I=Independent, Mod I=Modified Independent, S=Supervision, SBA=Standby Assistance, CGA=Contact Guard Assistance,  
Min=Minimal Assistance, Mod=Moderate Assistance, Max=Maximal Assistance, Total=Total Assistance, NT=Not Tested PLAN:  
FREQUENCY/DURATION: PT Plan of Care: 3 times/week for duration of hospital stay or until stated goals are met, whichever comes first. 
TREATMENT:  
 
TREATMENT:  
($$ Neuromuscular Re-education: 23-37 mins    ) Co-Treatment PT/OT necessary due to patient's decreased overall endurance/tolerance levels, as well as need for high level skilled assistance to complete functional transfers/mobility and functional tasks Neuromuscular Re-education (28 Minutes): Neuromuscular Re-education included Balance Training, Coordination training, Functional mobility with facilitation, Postural training and Sitting balance training to improve Balance, Coordination, Functional Mobility, Postural Control and Proprioception. TREATMENT GRID: 
 Date: 
3/17/21 Date: 
 Date: Activity/Exercise Parameters Parameters Parameters Supine AP 20x B Supine heel slides 10x B AA Supine SAQ 10x B AA    
     
     
     
     
 
 
 
AFTER TREATMENT POSITION/PRECAUTIONS: 
Bed, Needs within reach and RN notified INTERDISCIPLINARY COLLABORATION: 
RN/PCT, PT/PTA and OT/BELL TOTAL TREATMENT DURATION: 
PT Patient Time In/Time Out Time In: 6344 Time Out: 1110 Karen Barahona DPT

## 2021-03-19 NOTE — PROGRESS NOTES
Palliative Care Progress Note Patient: Kathy Stover MRN: [id number]  SSN: [id number] YOB: 1940  Age: 79 y.o. Sex: male Assessment/Plan: Chief Complaint/Interval History: lethargic and confused Principal Diagnosis: · Fatigue, Lethargy  R53.83 Additional Diagnoses: · Altered Mental Status R41.82 
· Debility, Unspecified  R53.81 
· Frailty  R54 
· Encounter for Palliative Care  Z51.5 Palliative Performance Scale (PPS) Medical Decision Making:  
Reviewed and summarized notes over last 24 hours Discussed case with appropriate providers Reviewed laboratory and x-ray data over last 24 hours Pt resting in bed, no distress noted. He is lethargic, but will attempt to open his eyes and answer questions. Pt is oriented to self only. He states he is tired but when asked why, he stated he didn't know. He said \"cold\" so I covered him with a blanket. Noted plans from daughter to meed with [facility] today. Will continue to follow. Will discuss findings with members of the interdisciplinary team.   
 
  
More than 50% of this 15 minute visit was spent counseling and coordination of care as outlined above. Subjective:  
 
Review of Systems: 
Review of systems not obtained due to patient factors- AMS Objective:  
 
Visit Vitals BP (!) 142/81 Pulse 67 Temp 98.2 °F (36.8 °C) Resp 20 Ht 5' 9\" (1.753 m) Wt 174 lb 13.2 oz (79.3 kg) SpO2 95% BMI 25.82 kg/m² Physical Exam: 
 
General:  Lethargic. Debilitated and frail. No acute distress. Eyes:  Conjunctivae/corneas clear Nose: Nares normal. Septum midline. Neck: Supple, symmetrical, trachea midline Lungs:   Clear to auscultation bilaterally, unlabored Heart:  Regular rate and rhythm Abdomen:   Soft, non-tender, non-distended Extremities: Normal, atraumatic, no cyanosis or edema Skin: Skin color, texture, turgor normal.  
Neurologic: Nonfocal  
Psych: Lethargic, confused Signed By: Soha Perrin NP March 19, 2021

## 2021-03-19 NOTE — PROGRESS NOTES
ACUTE OT GOALS: 
(Developed with and agreed upon by patient and/or caregiver.) 
1. Pt will toilet with min A  
2. Pt will complete functional mobility for ADLs with min A 
3. Pt will complete lower body dressing with mod A using AE as needed 
4. Pt will complete grooming and hygiene with min A 
5. Pt will complete HEP with min cues to promote increased BUE strength and functional use for ADLs 
6. Pt will maintain sitting balance for ADLs with min A  
7. Pt will complete UB bathing and dressing with CGA 
8. Pt will complete bed mobility with min A in prep for ADLs 
  
Timeframe: 7 days 
 
OCCUPATIONAL THERAPY: Daily Note OT Treatment Day # 2 
 
Jairon Mak is a 80 y.o. male  
PRIMARY DIAGNOSIS: Sepsis (HCC) 
Sepsis (HCC) [A41.9] 
Acute metabolic encephalopathy [G93.41] 
 Reason for Referral:  Confusion, L shoulder pain 
ICD-10: Treatment Diagnosis: Generalized Muscle Weakness (M62.81) 
Other lack of cordination (R27.8) 
  
[place] / Plan[place] / Product Type: Medicare /  
ASSESSMENT:  
 
REHAB RECOMMENDATIONS: CURRENT LEVEL OF FUNCTION: 
(Most Recently Demonstrated)  
Recommendation to date pending progress: 
Setting: 
• Short-term Rehab 
Equipment:  
• To Be Determined Bathing: 
• Not tested 
Dressing: 
• Unable to perform 
Feeding/Grooming: 
• Maximal Assistance 
Toileting: 
• Not tested 
Functional Mobility: 
• Unable to perform  
 
ASSESSMENT: 
Mr. Mak presents with sepsis, acute metabolic encephalopahty. Pt typically independent, cares for wife.  
 
Pt with deficits in cognition, strength, balance, and endurance impacting mobility and ADLs. Pt practiced bed mobility with Mod-MaxAx2, cueing for technique, sequencing, body mechanics. Practiced seated ADLs with fluctuating sitting balance, largely requiring heavy assist with strong posterior lean/push and posterior pelvic tilt. Practiced functional reach, leaning/reaching forward, midline positioning, weight shifting onto LUE,  grooming and self-drinking with Provale cup. Pt is functioning below baseline and would benefit from continued OT to increase safety and independence. SUBJECTIVE:  
Mr. Thien Borges states, \"A man. A man on the loose. \" SOCIAL HISTORY/LIVING ENVIRONMENT: Lives with and cares for wife. Unable to provide specifics d/t confusion/ cognitive impairment Home Environment: Private residence One/Two Story Residence: One story Living Alone: No 
Support Systems: Child(biju) OBJECTIVE:  
 
PAIN: VITAL SIGNS: LINES/DRAINS:  
Pre Treatment: Pain Screen Pain Scale 1: FLACC Pain Intensity 1: 0 Post Treatment: same   IV 
O2 Device: Room air ACTIVITIES OF DAILY LIVING: I Mod I S SBA CGA Min Mod Max Total NT Comments BASIC ADLs:             
Bathing/ Showering [] [] [] [] [] [] [] [] [] [x] Toileting [] [] [] [] [] [] [] [] [] [x] Dressing [] [] [] [] [] [] [] [] [x] [] Unable to manage socks Feeding [] [] [] [] [] [] [] [x] [] [] To grab and manage provale cup, unable to get water from it Grooming [] [] [] [] [] [] [] [x] [] [] Cueing and hand over hand for thoroughness to wash face Personal Device Care [] [] [] [] [] [] [] [] [] [x] Functional Mobility [] [] [] [] [] [] [] [] [] [x] I=Independent, Mod I=Modified Independent, S=Supervision, SBA=Standby Assistance, CGA=Contact Guard Assistance,  
Min=Minimal Assistance, Mod=Moderate Assistance, Max=Maximal Assistance, Total=Total Assistance, NT=Not Tested MOBILITY: I Mod I S SBA CGA Min Mod Max Total  NT x2 Comments:  
Supine to sit [] [] [] [] [] [] [x] [x] [] [] [x] Sit to supine [] [] [] [] [] [] [] [x] [] [] [x] Sit to stand [] [] [] [] [] [] [] [] [] [x] [] Bed to chair [] [] [] [] [] [] [] [] [] [x] [] I=Independent, Mod I=Modified Independent, S=Supervision, SBA=Standby Assistance, CGA=Contact Guard Assistance,  
Min=Minimal Assistance, Mod=Moderate Assistance, Max=Maximal Assistance, Total=Total Assistance, NT=Not Tested BALANCE: Good Fair+ Fair Fair- Poor NT Comments Sitting Static [] [] [] [x] [x] [] Sitting Dynamic [] [] [] [] [x] [] Standing Static [] [] [] [] [] [x] Standing Dynamic [] [] [] [] [] [x] PLAN:  
FREQUENCY/DURATION: OT Plan of Care: 3 times/week for duration of hospital stay or until stated goals are met, whichever comes first. 
 
TREATMENT:  
TREATMENT:  
($$ Self Care/Home Management: 8-22 mins$$ Therapeutic Activity: 8-22 mins   ) Co-Treatment PT/OT necessary due to patient's decreased overall endurance/tolerance levels, as well as need for high level skilled assistance to complete functional transfers/mobility and functional tasks Therapeutic Activity (10 Minutes): Therapeutic activity included Rolling, Supine to Sit, Sit to Supine, Scooting and Sitting balance  to improve functional Mobility, Strength, ROM and Activity tolerance. Self Care (18 Minutes): Self care including Self Feeding, Grooming and ADL Adaptive Equipment Training to increase independence and decrease level of assistance required. TREATMENT GRID: 
N/A 
 
AFTER TREATMENT POSITION/PRECAUTIONS: 
Bed and Needs within reach INTERDISCIPLINARY COLLABORATION: 
RN/PCT, PT/PTA and OT/BELL TOTAL TREATMENT DURATION: 
OT Patient Time In/Time Out Time In: 2792 Time Out: 1110 BRAD Andrade/CARRI

## 2021-03-19 NOTE — PROGRESS NOTES
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 Evaluated by cardiology, not a candidate for AJITH, repeat BC NTD · Acute nontraumatic L shoulder pain · Fever, resolved · RA; on remicade · MING, Cr improving · Aortic Aneurysm, not repaired per report Plan:  
· Hospice being considered by family; this would be appropriate given he needs valve replacement to cure this infection · Remains on Nafcillin IV but this can be changed over to Duricef 500mg PO BID, for as long as he can and wants to take it; to be understood that this would not be curative · ID will sign off, please call with questions or concerns or if they would like to pursue aggressive management Anti-infectives: · IV Nafcillin (3/14- 
· IV Vancomycin (3/14) · IV Rocephin (3/13) · IV Zithromax (3/13-3/14) Subjective: Intermittently oriented, no fever, chills, sweats, nausea. Left arm painful. Allergies Allergen Reactions  Citric Acid Other (comments)  Lisinopril Other (comments) lightheadedness Review of Systems:  A comprehensive review of systems was negative except for that written in the History of Present Illness. Objective:  
 
Visit Vitals BP (!) 114/53 Pulse 76 Temp 98.3 °F (36.8 °C) Resp 20 Ht 5' 9\" (1.753 m) Wt 79.3 kg (174 lb 13.2 oz) SpO2 93% BMI 25.82 kg/m² Temp (24hrs), Av.2 °F (36.8 °C), Min:97.9 °F (36.6 °C), Max:98.6 °F (37 °C) Patient examined 3/19/2021, exam remains unchanged except as noted below: 
 
Lines:  Peripheral IV:   Intact Physical Exam:   
General:  NAD, laying in bed Eyes:  No icterus or drainage Mouth/Throat: Dry MM Neck: Supple Lungs:   CTAB with normal WOB  
CV:  Regular rate and rhythm, 1/6 early systolic murmur heard best at LUSB Abdomen: Soft/NDNT Extremities: Pitting edema around ankles bilaterally Skin: No rash, healed scabs Musculoskeletal: No swollen joints, limited/painful ROM of L shoulder, improved since yesterday Psych: Awake, oriented to person and  Data Review: CBC: 
Recent Labs  
  21 
0654 21 
0604 21 
3787 WBC 8.5 8.6 9.8 GRANS 66 67 77 MONOS 11 11 8 EOS 3 2 1 ANEU 5.6 5.8 7.5 ABL 1.6 1.6 1.3 HGB 9.6* 9.5* 9.5* HCT 30.7* 29.6* 29.8* PLT 92* 92* 107* BMP: 
Recent Labs  
  21 
0654 21 
0604 21 
6010 CREA 1.37 1.42 1.61* BUN 31* 40* 49*  146* 147* K 3.1* 3.3* 3.7 * 116* 118* CO2 26 23 22 AGAP 5* 7 7 * 134* 108* LFTS: 
No results for input(s): TBILI, ALT, AP, TP, ALB in the last 72 hours. No lab exists for component: SGOT Microbiology:  
 
All Micro Results Procedure Component Value Units Date/Time CULTURE, BLOOD [[id number]] Collected: 21 Order Status: Completed Specimen: Blood Updated: 21 Special Requests: --     
  LEFT Antecubital 
  
  Culture result: NO GROWTH AFTER 21 HOURS     
 CULTURE, BLOOD [[id number]] Collected: 21 Order Status: Completed Specimen: Blood Updated: 21 121 Special Requests: --     
  RIGHT Antecubital 
  
  Culture result: NO GROWTH AFTER 21 HOURS     
 CULTURE, BLOOD [[id number]]  (Abnormal) Collected: 21 9640 Order Status: Completed Specimen: Blood Updated: 21 4209 Special Requests: --     
  LEFT 
HAND 
  
  GRAM STAIN    
  GRAM POS COCCI IN CLUSTERS  
     
   PEDIATRIC BOTTLE     
      
  CRITICAL RESULT NOT CALLED DUE TO PREVIOUS NOTIFICATION OF CRITICAL RESULT WITHIN THE LAST 24 HOURS. Culture result: STAPHYLOCOCCUS SPECIES SUBCULTURE IN PROGRESS  
     
 CULTURE, BLOOD [[id number]]  (Abnormal) Collected: 21 0995 Order Status: Completed Specimen: Blood Updated: 21 7546 Special Requests: --     
  RIGHT 
FOREARM 
  
  GRAM STAIN    
  GRAM POS COCCI IN CLUSTERS  
     
   AEROBIC BOTTLE POSITIVE RESULTS VERIFIED, PHONED TO AND READ BACK BY COLTON OSPINA AT 4292 ON 3/18/2021 Culture result: STAPHYLOCOCCUS AUREUS SENSITIVITY TO FOLLOW Refer to Blood Culture ID Panel Accession [id number] BLOOD CULTURE ID PANEL [[id number]]  (Abnormal) Collected: 03/17/21 0205 Order Status: Completed Specimen: Blood Updated: 03/18/21 7734 Acc. no. from [employer] [id number] Staphylococcus Detected Staphylococcus aureus Detected Comment: RESULTS VERIFIED, PHONED TO AND READ BACK BY 
COLTON OSPINA AT 2654 ON 3/18/2021 
  
  
  mecA (Methicillin-Resistance Genes) NOT DETECTED INTERPRETATION Gram positive cocci in clusters, identified in realtime PCR as probable MSSA. Comment: Recommend discontinuing IV vancomycin starting cefazolin or nafcillin if patient not on beta-lactam therapy. Infectious Diseases Consult recommended in adult patients. THIS TEST DOES NOT REPLACE SENSITIVITY TESTING. CULTURE, BLOOD [[id number]]  (Abnormal) Collected: 03/15/21 5014 Order Status: Completed Specimen: Blood Updated: 03/17/21 0750 Special Requests: --     
  LEFT 
ARM 
  
  GRAM STAIN AEROBIC BOTTLE POSITIVE     
      
  GRAM POS COCCI IN CLUSTERS RESULTS VERIFIED, PHONED TO AND READ BACK BY Omayra Cruz RN [id number] ON [id number] [place] Culture result: STAPHYLOCOCCUS AUREUS For Susceptibility Refer to Culture [address] [id number] CULTURE, BLOOD [[id number]]  (Abnormal) Collected: 03/15/21 8459 Order Status: Completed Specimen: Blood Updated: 03/17/21 8132 Special Requests: --     
  LEFT 
HAND 
  
  GRAM STAIN AEROBIC BOTTLE POSITIVE     
      
  GRAM POS COCCI IN CLUSTERS   RESULTS VERIFIED, PHONED TO AND READ BACK BY Omayra Cruz RN [id number] ON [id number] [place]  
     
 Culture result: STAPHYLOCOCCUS AUREUS For Susceptibility Refer to Culture [address] [place] [address] [employer] [id number] CULTURE, BLOOD [[id number]]  (Abnormal) Collected: 03/13/21 2114 Order Status: Completed Specimen: Blood Updated: 03/16/21 0557 Special Requests: --     
  LEFT Antecubital 
  
  GRAM STAIN    
  GRAM POS COCCI IN CLUSTERS  
     
      
  AEROBIC AND ANAEROBIC BOTTLES  
     
      
  CRITICAL RESULT NOT CALLED DUE TO PREVIOUS NOTIFICATION OF CRITICAL RESULT WITHIN THE LAST 24 HOURS. Culture result: STAPHYLOCOCCUS AUREUS For Susceptibility Refer to Culture [facility] [address] NO. [id number] CULTURE, BLOOD [[id number]]  (Abnormal)  (Susceptibility) Collected: 03/13/21 2114 Order Status: Completed Specimen: Blood Updated: 03/16/21 0557 Special Requests: --     
  RIGHT Antecubital 
  
  GRAM STAIN    
  GRAM POS COCCI IN CLUSTERS  
     
      
  AEROBIC AND ANAEROBIC BOTTLES RESULTS VERIFIED, PHONED TO AND READ BACK BY Nena Boggs RN @ 1120 ON 3/14/21 BY [facility] Culture result: STAPHYLOCOCCUS AUREUS Refer to Blood Culture ID Panel Accession [id number] C. DIFFICILE AG & TOXIN A/B [[id number]] Collected: 03/14/21 2219 Order Status: Completed Specimen: Stool Updated: 03/15/21 1303 [address] ANTIGEN    
  C. DIFFICILE GDH ANTIGEN-NEGATIVE  
     
  C. difficile toxin C. DIFFICILE TOXIN-NEGATIVE  
     
  PCR Reflex NOT APPLICABLE INTERPRETATION    
  NEGATIVE FOR TOXIGENIC C. DIFFICILE Clinical Consideration NEGATIVE FOR TOXIGENIC C. DIFFICILE  
     
 BLOOD CULTURE ID PANEL [[id number]]  (Abnormal) Collected: 03/13/21 2114 Order Status: Completed Specimen: Blood Updated: 03/14/21 1120 Acc. no. from [employer] [id number] Staphylococcus Detected Staphylococcus aureus Detected   Comment: RESULTS VERIFIED, PHONED TO AND READ BACK BY 
Nena Boggs RN @ 1120 ON 3/14/21 BY [facility] 
  
  
  Oralia (Methicillin-Resistance Genes) NOT DETECTED     
  INTERPRETATION    
  Gram positive cocci in clusters, identified in realtime PCR as probable MSSA.  
     
  Comment: Recommend discontinuing IV vancomycin starting cefazolin or nafcillin if patient not on beta-lactam therapy. Infectious Diseases Consult recommended in adult patients.  THIS TEST DOES NOT REPLACE SENSITIVITY TESTING.  
  
 COVID-19 RAPID TEST [[id number]] Collected: 03/13/21 2357  
 Order Status: Completed Specimen: Nasopharyngeal Updated: 03/14/21 0038  
  Specimen source Nasopharyngeal     
  COVID-19 rapid test Not detected     
  Comment:     
The specimen is NEGATIVE for SARS-CoV-2, the novel coronavirus associated with COVID-19. 
A negative result does not rule out COVID-19. 
    
This test has been authorized by the FDA under an Emergency Use Authorization (EUA) for use by authorized laboratories.  
    
Fact sheet for Healthcare Providers: https://www.fda.gov/media/770306/download 
Fact sheet for Patients: https://www.fda.gov/media/717244/download 
    
Methodology: Isothermal Nucleic Acid Amplification 
  
  
 COVID-19 RAPID TEST [[id number]] Collected: 03/13/21 2357  
 Order Status: Canceled   
  
 
 
Imaging:  
reviewed 
 
Signed By: Suha Hensley NP   
 March 19, 2021

## 2021-03-19 NOTE — PROGRESS NOTES
met with patient at bedside, offered support, assurance of spiritual care staff's prayers. Bing LORENZANAN

## 2021-03-19 NOTE — PROGRESS NOTES
Oxygen Qualifier Room air: SpO2 with O2 and liter flow Resting SpO2  95% Ambulating SpO2  n/a n/a Unable to do ambulatory sat due to limited mobility. Resting Room Air 95%. RN aware and notified. Completed by: 
 
Ricky Ketn

## 2021-03-19 NOTE — PROGRESS NOTES
Comprehensive Nutrition Assessment Type and Reason for Visit: Sunday Ronene Nutrition Recommendations/Plan:  
 Continue current diet  Continue Ensure Enlive TID with meals (strawberry per pt preference)  Continue 1:1 assistance with meals Malnutrition Assessment: 
Malnutrition Status: Mild malnutrition Context: Chronic illness Findings of clinical characteristics of malnutrition:  
Energy Intake:  Mild decrease in energy intake (specify)(poor po intake PTA) Weight Loss:  No significant weight loss Body Fat Loss:  No significant body fat loss, Muscle Mass Loss:  1 - Mild muscle mass loss, Clavicles (pectoralis &deltoids), Temples (temporalis) Fluid Accumulation:  Unable to assess,   
 Strength:  Not performed Nutrition Assessment:  
Nutrition History: CAD, Crohns, RA, dyslipidemia, HTN, MI     
Nutrition Background: Pt admitted with sepsis and acute metabolic encephalopathy. Has been drowsy, confused, and feverish since admission. Daily Update: 
Per RN and PCT, pt ate most of breakfast this morning and drank 100% of Ensure Enlive. Pt states he's drinking 2-3 Ensure Enlive per day. Per SLP note 3/17, per MBS results recommending pureed diet with regular thin liquids via provale cup only. RN reports pt is drinking liquids and Ensure from provale cup well. Noted pt's daughter requesting to talk to [facility]. PC met with daughter 3/18. Current Nutrition Therapies: DIET NUTRITIONAL SUPPLEMENTS All Meals, Dinner, Lunch, Breakfast; Ensure [employer] ( ) DIET PUREED Current Intake: Average Meal Intake: 51-75% Average Supplement Intake: 51-75% Anthropometric Measures: 
Height: 5' 9\" (175.3 cm) Current Body Wt: 79.3 kg (174 lb 13.2 oz)(3/18), Weight source: Bed scale BMI: 25.8, Overweight (BMI 25.0-29. 9) Ideal Body Weight (lbs) (Calculated): 160 lbs (73 kg), 105.6 % Edema: No data recorded Estimated Daily Nutrient Needs: 
Energy (kcal/day): 7139-6809 (Kcal/kg(20-25), Weight Used: XDUVIIP(90.4ZK)) Protein (g/day): 79-95(1-1.2gm/kg) Weight Used: (Current(79.3kg)) Nutrition Diagnosis:  
· Inadequate oral intake related to cognitive or neurological impairment as evidenced by poor intake prior to admission · Mild malnutrition, In context of chronic illness related to inadequate protein-energy intake as evidenced by (criteria provided in malnutrition assessment above) Nutrition Interventions:  
Food and/or Nutrient Delivery: Continue current diet, Continue oral nutrition supplement Coordination of Nutrition Care: Continue to monitor while inpatient Plan of Care discussed with [facility] Trever Marshall RN and KENDALL Diaz Goals:  
Previous Goal Met: Progressing toward goal(s) Active Goal: Meet >75% estimated nutrition needs within 7 days Nutrition Monitoring and Evaluation:  
  
Food/Nutrient Intake Outcomes: Food and nutrient intake, Supplement intake Discharge Planning: Too soon to determine Electronically signed by Susan Schumacher MS, RDN, LD 3/19/2021 at 1:11 PM 
Contact: [phone]

## 2021-03-19 NOTE — HOSPICE
[facility]- 
 
I met with pt a little before his buddy/HCPOA, Rebekah Cabrera, arrived. Pt is pale, on RA, HOB up 80 degrees. Aroused easily and I gave him sweet tea via his \"specialty cup\". No coughing noted but became sl SOB with the exertion. Diane Burgess arrived with her daughter and pt began to cry when he saw his granddaughter and hugged her. Diane Burgess and I then went to a private area to discuss hospice services and support. We discussed home hospice support, care team members and freq of visits,  and care at the Critical access hospital for aggressive sx management or for Respite care. Diane Burgess was tearful at times and explained how the pt was caring for his wife  who has advanced dementia and also a hx of Bipolar for decades. Diane Burgess was able to place her mother in a Memory Care Unit today \"for respite\" but it may need to be permanent. Diane Burgess is interested in hospice philosophy for her father and the focus on comfort care. She wants to talk with her  and her 2 brothers before making a decision. She is contemplating bring her father to her home on Monday (she works Sat and Sun)  One brother from [place] is planning to come in the next day or 2. I gave her the [facility] # [phone] if she needed to reach out to [facility] and Ulices Orta RN will f/u over the weekend. Dr. Saravanan Spence with [facility] has approved the pt for routine hospice. She wanted to speak with the MD and message given to [address] [place] [address] concerning the above and asked if she would reach out to the MD. 
 
Thank you for this referral- 
 
Dominik Clarke RN BSN Hospice Liaison [phone]

## 2021-03-19 NOTE — PROGRESS NOTES
Pt resting in bed with eyes closed. No distress noted at this time. Pt with poor po intake with lunch and dinner today. Pt only drank ensure. Call light in reach, will monitor.

## 2021-03-19 NOTE — PROGRESS NOTES
CM called patient's daughter to discuss discharge plans. Daughter wants to meet with [facility]. Daughter will be here later this afternoon to meet with Dede Granger with [facility].

## 2021-03-20 NOTE — PROGRESS NOTES
Beside shift report received from [facility]  Patient lying in bed  Respirations present  No signs of distress  No needs expressed at this time  Safety measures in place

## 2021-03-20 NOTE — PROGRESS NOTES
Progress Note Patient: Lopez Short MRN: [id number]  SSN: [id number] YOB: 1940  Age: 79 y.o. Sex: male Admit Date: 3/13/2021 LOS: 6 days Assessment and Plan:  
79 y. o. male with medical history of RA on bi-weekly infusion, CAD, idiopathic thrombocytopenia, hypothyroidism, AAA and HTN presented with daughter at bedside as patient was confused Sepsis Bacteremia Mitral valve endocarditis Abx: Vanc/Zosyn (3/14-3/14) changed to Nafcillin (3/14-. ..) BCx 3/13: grew MSSA Repeat BCx 3/15: GPC 2/2 Repeat BCx 3/17 GPC 
TTE 3/16 w/ EF 55-60%; mod-severe MR with mitral valve vegetation suggesting endocarditis ID on board--Cont with Nafcillin Cardiology consulted--spoke with daughter in regards to AJITH. Daughter decided no surgery F/u BCx, ID and card recs Palliative care recs Hospice recs 
  
Acute metabolic encephalopathy/delirium, improved but waxing and waning 
-Most likely AMS 2/2 above sepsis vs septic emboli given new findings of MV endocarditis. CT head no acute findings. On admission, mumbles, difficult to understand, does not follow commands Avoid sedating meds Fall precautions Delirium precautions PT/OT 
SLP to eval and treat--MBS done and recommended pureed diet with 1:1 assistance with meals Infection management as stated Vit B12, folate, ammonia, RPR, HIV, TSH/Free T4--unremarkable Considering MRI brain 
  
Left shoulder pain Had zoster over the posterior aspect of the left shoulder region few days ago. -Shoulder x-ray--no acute findings, only degenerative changes.   
-MRI shoulder pending--holding until improvement in mentation 
  
MING Avoid nephrotoxic meds Accurate I&O's 
Holding home diuretics/ACE-I/ARB MIVF 
  
Poor p.o. intake 
-as per daughter Lost about 5 pounds unintentionally.   
-Nutrition consult.   
-mIVF: Changed to D5W d/t hypernatremia 
-SLP to assist--MBS pending, ok for pureed diet for now 
  
Hypernatremia D/t poor po intake IVF: D5W 
BMP daily 
  
Rheumatoid arthritis Holding Leflunomide and Remicade 
  
Coronary disease:  
Cont home ASA, statin, ARB 
  
Hypothyroidism Cont home synthroid 
  
Abdominal aortic aneurysm Cont home statin 
  
History of idiopathic thrombocytopenia Monitor CBC 
  
Hypertension  
Normotensive 
  
DVT Prophylaxis: Lovenox SQ Subjective:  
79 y. o. male with medical history of RA on bi-weekly infusion, CAD, idiopathic thrombocytopenia, hypothyroidism, AAA and HTN presented with daughter at bedside as patient was confused cannot give any information. According to daughter, pt started feeling bad since the second Covid vaccine late February. On 3/10, he was complaining of left shoulder pain associated with confusion and mumbling speech and was taken to . Daughter was concerned that he might be having a stroke. However he was diagnosed with herpes zoster and was sent home.  He reportedly had an x-ray done at  of left shoulder with no fracture noted. 
 Patient's symptoms persisted with decreased p.o. intake with associated 5 lbs weight loss. He developed a fever morning of 3/13/2021, daughter gave him a dose of Tylenol and brought pt to the ER and was discharged home. Daughter noticed that patient was having chills and again having T103, hence brought patient back to the emergency room. Patient at baseline was independent with ADL's and actually can care of his wife who has dementia. On admission, patient was only mumbling with difficult to understand speech but did complain of pain on moving the LUE which was in flexed position. He was able to move all other extremities on his own except for left upper extremity but did not really follow verbal commands. ED workup includes WBC 13.2, hemoglobin 9.9, creatinine 1.25, GFR of 59. Rapid Covid test negative. CT head no acute findings. Chest x-ray shows low lung volumes with basilar consolidation. Pt was given Ceftriaxone and Azithromycin in ED.  He is admitted for acute metabolic encephalopathy, sepsis secondary to pneumonia. Blood cultures grew MSSA. ID has been consulted. Antibiotics were changed to Nafcillin, f/u BCx. MRI shoulder--hold d/t mentation, anticipate 4 weeks abx course d/t immunosuppressives w/o obvious source. SLP recommended pureed diet. MBS completed 3/17. MRI brain pending. TTE 3/16 w/ EF 55-60%; mod-severe MR with mitral valve vegetation suggesting endocarditis. Patient seen and examined at bedside. Lethargic, arousable, confused, unable to obtain history. Objective:  
 
Vitals:  
 03/20/21 0330 03/20/21 0736 03/20/21 1125 03/20/21 1507 BP: 110/71 120/71 112/70 118/73 Pulse: 69 68 76 76 Resp: 18 18 18 18 Temp: 98.4 °F (36.9 °C) 97.9 °F (36.6 °C) 97.4 °F (36.3 °C) 98.4 °F (36.9 °C) SpO2: 96% 93% 95% 94% Weight:      
Height:      
  
 
Intake and Output: 
Current Shift: 03/20 0701 - 03/20 1900 In: 560 [P.O.:560] Out: - Last three shifts: 03/18 1901 - 03/20 0700 In: 360 [P.O.:360] Out: -  
 
ROS Unable to obtain due to patients condition Physical Exam:  
General: Lethargic, NAD HEENT: NC/AT, EOM are intact Neck: supple, no JVD Cardiovascular: RRR, S1, S2 
Respiratory: Lungs are clear, no wheezes or rales Abdomen: Soft, NT, ND Back: No CVA tenderness, no paraspinal tenderness Extremities: LE without pedal edema, no erythema Neuro: CN are intact, no focal deficits Skin: no rash or ulcers Psych: good mood and affect Lab/Data Review: 
I have personally reviewed patients laboratory data showing No results found for this or any previous visit (from the past 24 hour(s)). All Micro Results Procedure Component Value Units Date/Time CULTURE, BLOOD [[id number]]  (Abnormal) Collected: 03/18/21 1427 Order Status: Completed Specimen: Blood Updated: 03/20/21 8435 Special Requests: --     
  RIGHT Antecubital 
  
  GRAM STAIN    
  GRAM POS COCCI IN CLUSTERS  
     
   AEROBIC BOTTLE POSITIVE     
 RESULTS VERIFIED, PHONED TO AND READ BACK BY Feng Gama RN ON 03/20/21 @1515, ADS Culture result: STAPHYLOCOCCUS AUREUS For Susceptibility Refer to Culture [address] [id number] CULTURE, BLOOD [[id number]] Collected: 03/18/21 1425 Order Status: Completed Specimen: Blood Updated: 03/20/21 1119 Special Requests: --     
  LEFT Antecubital 
  
  Culture result: NO GROWTH 2 DAYS     
 CULTURE, BLOOD [[id number]]  (Abnormal) Collected: 03/17/21 8340 Order Status: Completed Specimen: Blood Updated: 03/20/21 0753 Special Requests: --     
  LEFT 
HAND 
  
  GRAM STAIN    
  GRAM POS COCCI IN CLUSTERS  
     
   PEDIATRIC BOTTLE     
      
  CRITICAL RESULT NOT CALLED DUE TO PREVIOUS NOTIFICATION OF CRITICAL RESULT WITHIN THE LAST 24 HOURS. Culture result: STAPHYLOCOCCUS AUREUS For Susceptibility Refer to Culture [address] [id number] CULTURE, BLOOD [[id number]]  (Abnormal)  (Susceptibility) Collected: 03/17/21 4328 Order Status: Completed Specimen: Blood Updated: 03/20/21 0750 Special Requests: --     
  RIGHT 
FOREARM 
  
  GRAM STAIN    
  GRAM POS COCCI IN CLUSTERS  
     
   AEROBIC BOTTLE POSITIVE RESULTS VERIFIED, PHONED TO AND READ BACK BY COLTON OSPINA AT 3014 ON 3/18/2021 Culture result: STAPHYLOCOCCUS AUREUS Refer to Blood Culture ID Panel Accession [id number] BLOOD CULTURE ID PANEL [[id number]]  (Abnormal) Collected: 03/17/21 5902 Order Status: Completed Specimen: Blood Updated: 03/18/21 5631 Acc. no. from [employer] [id number] Staphylococcus Detected Staphylococcus aureus Detected Comment: RESULTS VERIFIED, PHONED TO AND READ BACK BY 
COLTON OSPINA AT 0260 ON 3/18/2021 
  
  
  mecA (Methicillin-Resistance Genes) NOT DETECTED INTERPRETATION Gram positive cocci in clusters, identified in realtime PCR as probable MSSA.   
     
  Comment: Recommend discontinuing IV vancomycin starting cefazolin or nafcillin if patient not on beta-lactam therapy. Infectious Diseases Consult recommended in adult patients. THIS TEST DOES NOT REPLACE SENSITIVITY TESTING. CULTURE, BLOOD [[id number]]  (Abnormal) Collected: 03/15/21 0591 Order Status: Completed Specimen: Blood Updated: 03/17/21 0750 Special Requests: --     
  LEFT 
ARM 
  
  GRAM STAIN AEROBIC BOTTLE POSITIVE     
      
  GRAM POS COCCI IN CLUSTERS RESULTS VERIFIED, PHONED TO AND READ BACK BY Misha Wade RN [id number] ON [id number] [place] Culture result: STAPHYLOCOCCUS AUREUS For Susceptibility Refer to Culture [address] [place] [address] [id number] CULTURE, BLOOD [[id number]]  (Abnormal) Collected: 03/15/21 6612 Order Status: Completed Specimen: Blood Updated: 03/17/21 0315 Special Requests: --     
  LEFT 
HAND 
  
  GRAM STAIN AEROBIC BOTTLE POSITIVE     
      
  GRAM POS COCCI IN CLUSTERS RESULTS VERIFIED, PHONED TO AND READ BACK BY Misha Wade RN [id number] ON [id number] [place] Culture result: STAPHYLOCOCCUS AUREUS For Susceptibility Refer to Culture [address] [place] [address] [id number] CULTURE, BLOOD [[id number]]  (Abnormal) Collected: 03/13/21 2114 Order Status: Completed Specimen: Blood Updated: 03/16/21 0557 Special Requests: --     
  LEFT Antecubital 
  
  GRAM STAIN    
  GRAM POS COCCI IN CLUSTERS  
     
      
  AEROBIC AND ANAEROBIC BOTTLES  
     
      
  CRITICAL RESULT NOT CALLED DUE TO PREVIOUS NOTIFICATION OF CRITICAL RESULT WITHIN THE LAST 24 HOURS. Culture result: STAPHYLOCOCCUS AUREUS For Susceptibility Refer to Culture [address] [place] [address] NO. [id number] CULTURE, BLOOD [[id number]]  (Abnormal)  (Susceptibility) Collected: 03/13/21 2114 Order Status: Completed Specimen: Blood Updated: 03/16/21 0557 Special Requests: --     
  RIGHT Antecubital 
  
  GRAM STAIN    
  GRAM POS COCCI IN CLUSTERS  
     
      
  AEROBIC AND ANAEROBIC BOTTLES  
 RESULTS VERIFIED, PHONED TO AND READ BACK BY Sammie Hodgkin, RN @ 1120 ON 3/14/21 BY AMM Culture result: STAPHYLOCOCCUS AUREUS Refer to Blood Culture ID Panel Accession [id number] C. DIFFICILE AG & TOXIN A/B [[id number]] Collected: 03/14/21 2219 Order Status: Completed Specimen: Stool Updated: 03/15/21 1303 [address] ANTIGEN    
  C. DIFFICILE GDH ANTIGEN-NEGATIVE  
     
  C. difficile toxin C. DIFFICILE TOXIN-NEGATIVE  
     
  PCR Reflex NOT APPLICABLE INTERPRETATION    
  NEGATIVE FOR TOXIGENIC C. DIFFICILE Clinical Consideration NEGATIVE FOR TOXIGENIC C. DIFFICILE  
     
 BLOOD CULTURE ID PANEL [[id number]]  (Abnormal) Collected: 03/13/21 2114 Order Status: Completed Specimen: Blood Updated: 03/14/21 1120 Acc. no. from [employer] [id number] Staphylococcus Detected Staphylococcus aureus Detected Comment: RESULTS VERIFIED, PHONED TO AND READ BACK BY 
Sammie Hodgkin, RN @ 1120 ON 3/14/21 BY AMM 
  
  
  mecA (Methicillin-Resistance Genes) NOT DETECTED INTERPRETATION Gram positive cocci in clusters, identified in realtime PCR as probable MSSA. Comment: Recommend discontinuing IV vancomycin starting cefazolin or nafcillin if patient not on beta-lactam therapy. Infectious Diseases Consult recommended in adult patients. THIS TEST DOES NOT REPLACE SENSITIVITY TESTING. COVID-19 RAPID TEST [[id number]] Collected: 03/13/21 2357 Order Status: Completed Specimen: Nasopharyngeal Updated: 03/14/21 0038 Specimen source Nasopharyngeal     
  COVID-19 rapid test Not detected Comment:     
The specimen is NEGATIVE for SARS-CoV-2, the novel coronavirus associated with COVID-19. A negative result does not rule out COVID-19. This test has been authorized by the FDA under an Emergency Use Authorization (EUA) for use by authorized laboratories.   
    
Fact sheet for Healthcare Providers: ConventionUpdate.co.nz Fact sheet for Patients: ConventionUpdate.co.nz Methodology: Isothermal Nucleic Acid Amplification COVID-19 RAPID TEST [[id number]] Collected: 03/13/21 8689 Order Status: Canceled Image: 
I have personally reviewed patients imaging showing XR SWALLOW FUNC VIDEO Final Result Laryngeal penetration without tracheal aspiration  Please see speech  
pathologist report for further details. XR SHOULDER LT AP/LAT MIN 2 V Final Result Mild degenerative change. No acute findings. CT HEAD WO CONT Final Result 1. No CT evidence of acute intracranial process. XR CHEST PORT Final Result Low lung volumes with basilar consolidation. Hospital problems Principal Problem: 
  Sepsis ([address] (3/14/2021) Active Problems: 
  Rheumatoid arthritis ([address] (8/1/2012) Hypothyroidism (8/1/2012) Mixed hyperlipidemia (8/1/2012) Chronic idiopathic thrombocytopenia ([address] (8/1/2012) Overview: S/P W/U Dr Raysa Hobson AAA (abdominal aortic aneurysm) ([facility] (2/6/2013) Overview: Followed by Vascular Surgery Acute metabolic encephalopathy (6/83/8269) PNA (pneumonia) (3/14/2021) Suspected COVID-19 virus infection (3/14/2021) Bacteremia (3/14/2021) MING (acute kidney injury) ([address] (3/17/2021) Endocarditis of mitral valve (3/17/2021) Mild protein-calorie malnutrition ([address] (3/19/2021) I have reviewed, updated, and verified this note's content and spent 38 minutes of my 42 minutes visit performing counseling and coordination of care regarding medical management. Signed By: Pool Daniel MD   
 March 20, 2021

## 2021-03-20 NOTE — HOSPICE
Patient approved for RHC with [facility]. Consents signed by Daughter/POA Noel Diaz. Alla Felipe arranged to pick patient up Sunday Morning at [address] will be delivered prior to patient arriving home. Please provide transport DNR. Any questions, please call [phone]. Thanks, Imelda Neal RN BSN [facility]

## 2021-03-20 NOTE — PROGRESS NOTES
Problem: Falls - Risk of 
Goal: *Absence of Falls Description: Document [place] Cease Fall Risk and appropriate interventions in the flowsheet. Outcome: Progressing Towards Goal 
Note: Fall Risk Interventions: 
Mobility Interventions: Patient to call before getting OOB Mentation Interventions: Door open when patient unattended Medication Interventions: Evaluate medications/consider consulting pharmacy Elimination Interventions: Call light in reach History of Falls Interventions: Door open when patient unattended Problem: Pressure Injury - Risk of 
Goal: *Prevention of pressure injury Description: Document David Scale and appropriate interventions in the flowsheet. Outcome: Progressing Towards Goal 
Note: Pressure Injury Interventions: 
Sensory Interventions: Assess changes in LOC Moisture Interventions: Check for incontinence Q2 hours and as needed Activity Interventions: Assess need for specialty bed Mobility Interventions: Assess need for specialty bed Nutrition Interventions: Document food/fluid/supplement intake Friction and Shear Interventions: Lift sheet

## 2021-03-20 NOTE — PROGRESS NOTES
Patient has rested quietly overnight and been observed during hourly rounding. He appears comfortable and shows no signs of distress. Call light remains in reach of patient and sayda continue to monitor as needed.

## 2021-03-21 NOTE — DISCHARGE INSTRUCTIONS
Patient Education        Learning About Sepsis  What is sepsis? Sepsis is an intense reaction to an infection. It can cause deadly damage to the body and lead to dangerously low blood pressure. You may have inflammation across large areas of your body. It can damage tissue and even go deep into your organs. Sepsis can develop very quickly. It requires immediate care in a hospital.  Infections that can lead to sepsis include:  · A skin infection such as from a cut. · A lung infection like pneumonia. · A kidney infection. · A gut infection such as E. coli. Symptoms can include low blood pressure, breathing problems, fast heartbeat, and confusion. Other symptoms include fever or low body temperature, chills, cool clammy skin, skin rashes, and shaking. Sepsis can cause problems in many organs. People with sepsis might need to be treated in an intensive care unit (ICU) for several days or weeks. An ICU is a part of the hospital where very sick people get care. Septic shock is sepsis that causes extremely low blood pressure, which limits blood flow to the body. It can cause organ failure and death. How is sepsis treated? Doctors will treat the person with antibiotics. They will try to find the infection that led to sepsis. Machines will track the person's vital signs, including temperature, blood pressure, breathing rate, and pulse rate. The person will get fluids through an IV. He or she may also get strong medicine. This can help raise the blood pressure. If a person with sepsis is very sick, equipment in the ICU can support many body systems. That includes breathing, circulation, fluids, and help for organs like the kidneys and heart. If the person needs help breathing, a ventilator may be used. What can you expect when someone has sepsis? The person may start new treatments while still in the hospital. Different doctors may help with different symptoms.   If a person needs to be treated in the intensive care unit (ICU), the ICU staff will do everything they can to treat all of the problems sepsis causes, including the infection. The ICU can be scary and confusing for patients and their families, friends, and supporters. But it's designed to keep your loved one comfortable and safe and to provide the best medical care. Expect a long recovery after the person leaves the ICU. If you need it, ask for support from friends and family. Where can you learn more? Go to http://www.gray.com/  Enter [id number] in the search box to learn more about \"Learning About Sepsis. \"  Current as of: June 26, 2019               Content Version: 12.6  © 1584-6183 [employer]. Care instructions adapted under license by [employer] (which disclaims liability or warranty for this information). If you have questions about a medical condition or this instruction, always ask your healthcare professional. [address] any warranty or liability for your use of this information.

## 2021-03-21 NOTE — DISCHARGE SUMMARY
Date of Admission: 3/13/2021 Date of Discharge: 3/21/2021 Discharge Diagnoses: 
Mitral valve endocarditis Active Hospital Problems Diagnosis Date Noted  Mild protein-calorie malnutrition ([facility] 03/19/2021  MING (acute kidney injury) ([facility] 03/17/2021  Endocarditis of mitral valve 03/17/2021  Sepsis ([facility] 03/14/2021  Acute metabolic encephalopathy 55/77/0766  PNA (pneumonia) 03/14/2021  Suspected COVID-19 virus infection 03/14/2021  Bacteremia 03/14/2021  AAA (abdominal aortic aneurysm) ([facility] 02/06/2013 Followed by Vascular Surgery  Rheumatoid arthritis ([facility] 08/01/2012  Hypothyroidism 08/01/2012  Mixed hyperlipidemia 08/01/2012  Chronic idiopathic thrombocytopenia ([facility] 08/01/2012 S/P W/U Dr Riley Isabel Discharge Medications: 
Discharge Medication List as of 3/21/2021  9:21 AM  
  
START taking these medications Details  
cefadroxil (DURICEF) 1 gram tablet Take 1 Tab by mouth two (2) times a day., Normal, Disp-60 Tab, R-0  
  
  
CONTINUE these medications which have NOT CHANGED Details  
levothyroxine (SYNTHROID) 100 mcg tablet Take 1 Tab by mouth Daily (before breakfast). , Normal, Disp-90 Tab, R-1  
  
atorvastatin (LIPITOR) 40 mg tablet TAKE 1 TABLET EVERY EVENING, Normal, Disp-90 Tab, R-1  
  
diclofenac (VOLTAREN) 1 % gel Apply 4 g to affected area four (4) times daily. , Print, Disp-100 g, R-4  
  
nitroglycerin (NITROSTAT) 0.4 mg SL tablet 1 Tab by SubLINGual route every five (5) minutes as needed for Chest Pain., Normal, Disp-1 Bottle, R-3  
  
fluticasone (FLONASE) 50 mcg/actuation nasal spray 2 Sprays by Both Nostrils route as needed for Rhinitis., Normal, Disp-1 Bottle, R-3  
  
aspirin delayed-release 81 mg tablet Take 81 mg by mouth nightly., Historical Med  
  
  
STOP taking these medications  
  
 traMADoL (Ultram) 50 mg tablet Comments:  
Reason for Stopping:   
   
 losartan-hydroCHLOROthiazide (HYZAAR) 100-12.5 mg per tablet Comments:  
Reason for Stopping:   
   
 leflunomide (ARAVA) 20 mg tablet Comments:  
Reason for Stopping:   
   
 INFLIXIMAB (REMICADE IV) Comments:  
Reason for Stopping:   
   
  
  
 
Pending Labs: 
None Follow-up (including scheduled tests): Follow-up Information Follow up With Specialties Details Why Contact Info Caridad Mclean MD Internal Medicine   [address] [facility] [address] 
[phone] History of Present Illness: 
79 y. o. male with medical history of RA on bi-weekly infusion, CAD, idiopathic thrombocytopenia, hypothyroidism, AAA and HTN presented with daughter at bedside as patient was confused cannot give any information. According to daughter, pt started feeling bad since the second Covid vaccine late February. On 3/10, he was complaining of left shoulder pain associated with confusion and mumbling speech and was taken to . Daughter was concerned that he might be having a stroke. However he was diagnosed with herpes zoster and was sent home.  He reportedly had an x-ray done at  of left shoulder with no fracture noted. 
 Patient's symptoms persisted with decreased p.o. intake with associated 5 lbs weight loss. He developed a fever morning of 3/13/2021, daughter gave him a dose of Tylenol and brought pt to the ER and was discharged home. Daughter noticed that patient was having chills and again having T103, hence brought patient back to the emergency room. Past Medical History: 
Past Medical History:  
Diagnosis Date  Aneurysm ([address]  ASCAD - of the native vessel 6/13/2016  CAD (coronary artery disease) 3 stents  had M.I. 2001  Cancer ([address] melanoma  Chronic pain   
 back  Coagulation disorder ([address]   
 pt denies this  Crohn's colitis (Dr Fernanda Do) 8/1/2012  crohns's disease   
 stable  Disorder of bone and cartilage, unspecified 8/11/2014  Dyslipidemia 6/13/2016  GERD (gastroesophageal reflux disease)   
 under control with med  History of skin cancer   
 ear and right forearm  Hypercholesterolemia  Hypertension  MI (myocardial infarction) ([facility] 2001  Obesity 30.5 bmi  
 Other specified inflammatory polyarthropathies(714.89) 1/9/2013  PVC (premature ventricular contraction)   
 on Kardia RS  
 PVC (premature ventricular contraction)  RA (rheumatoid arthritis) (HCC)   
 takes remicaid  Status Post PTCA/stent 6/13/2016  Thrombocytopenia, unspecified ([facility] 4/10/2014  Thyroid disease   
 hypo. under control with med  Trochanteric bursitis 5/1/2013  Unspecified sleep apnea   
 not using c-pap Allergies: Allergies Allergen Reactions  Citric Acid Other (comments)  Lisinopril Other (comments) [facility] Hospital Course: 
79 y. o. male with medical history of RA on bi-weekly infusion, CAD, idiopathic thrombocytopenia, hypothyroidism, AAA and HTN presented with daughter at bedside as patient was confused cannot give any information. According to daughter, pt started feeling bad since the second Covid vaccine late February. On 3/10, he was complaining of left shoulder pain associated with confusion and mumbling speech and was taken to . Daughter was concerned that he might be having a stroke. However he was diagnosed with herpes zoster and was sent home.  He reportedly had an x-ray done at  of left shoulder with no fracture noted. 
 Patient's symptoms persisted with decreased p.o. intake with associated 5 lbs weight loss. He developed a fever morning of 3/13/2021, daughter gave him a dose of Tylenol and brought pt to the ER and was discharged home. Daughter noticed that patient was having chills and again having T103, hence brought patient back to the emergency room. Patient at baseline was independent with ADL's and actually can care of his wife who has dementia.  
On admission, patient was only mumbling with difficult to understand speech but did complain of pain on moving the LUE which was in flexed position. He was able to move all other extremities on his own except for left upper extremity but did not really follow verbal commands. ED workup includes WBC 13.2, hemoglobin 9.9, creatinine 1.25, GFR of 59. Rapid Covid test negative. CT head no acute findings. Chest x-ray shows low lung volumes with basilar consolidation. Pt was given Ceftriaxone and Azithromycin in ED. He is admitted for acute metabolic encephalopathy, sepsis secondary to pneumonia. Blood cultures grew MSSA. ID has been consulted. Antibiotics were changed to Nafcillin, f/u BCx. MRI shoulder--hold d/t mentation, anticipate 4 weeks abx course d/t immunosuppressives w/o obvious source. SLP recommended pureed diet. MBS completed 3/17. MRI brain pending. TTE 3/16 w/ EF 55-60%; mod-severe MR with mitral valve vegetation suggesting endocarditis. 79 y. o. male with medical history of RA on bi-weekly infusion, CAD, idiopathic thrombocytopenia, hypothyroidism, AAA and HTN presented with daughter at bedside as patient was confused 
  
Sepsis Bacteremia  
Mitral valve endocarditis Abx: Vanc/Zosyn (3/14-3/14) changed to Nafcillin (3/14-3/21) BCx 3/13: grew MSSA BCx persistently positive 
TTE 3/16 w/ EF 55-60%; mod-severe MR with mitral valve vegetation suggesting endocarditis ID consulted  
Cardiology consulted--spoke with daughter in regards to AJITH. Daughter decided no interventions Since no valve replacement poor prognosis only on antibiotics, family aware Palliative care consulted Hospice consulted Discharged home hospice Discharged on duricef Procedures: 
None Discharge Physical Exam: 
General: Alert, confused, NAD HEENT: NC/AT, EOM are intact Neck: supple, no JVD Cardiovascular: RRR, S1, S2, no murmurs Respiratory: Lungs are clear, no wheezes or rales Abdomen: Soft, NT, ND Back: No CVA tenderness, no paraspinal tenderness Extremities: LE without pedal edema, no erythema Neuro: CN are intact, no focal deficits Skin: no rash or ulcers Psych: good mood and affect No results found for this or any previous visit (from the past 24 hour(s)). XR SWALLOW FUNC VIDEO Final Result Laryngeal penetration without tracheal aspiration  Please see speech  
pathologist report for further details. XR SHOULDER LT AP/LAT MIN 2 V Final Result Mild degenerative change. No acute findings. CT HEAD WO CONT Final Result 1. No CT evidence of acute intracranial process. XR CHEST PORT Final Result Low lung volumes with basilar consolidation. Condition: Unchanged / Poor prognosis Disposition: Home hospice Consultants During This Hospitalization: ID, cards, palliative

## 2021-03-21 NOTE — PROGRESS NOTES
Prescriptions and discharge instructions reviewed with patient and family member Opportunity to ask questions given

## 2021-03-21 NOTE — PROGRESS NOTES
Report received from [address]. Patient resting quietly in bed. Respirations present, even and unlabored on 2L NC. Bed low and locked, safety measures in place. No signs of distress, no needs expressed. Call light within reach, encouraged patient to call with any needs. Will continue to monitor.

## 2021-03-21 NOTE — PROGRESS NOTES
Patient will be d/c home with [facility] [place] [place] service. [address] [facility] arranged to pick patient up at 10am.  Son at bedside. Patient will be transport to daughter Trinity Kessler) home at [address]. CM made contact with Lestine Cushing concerning equipment being delivered before patient arrives home. Lestine Cushing will make contact with equipment company. CM will continue to monitor and remain available for any needs or concerns that may occur. Care Management Interventions PCP Verified by CM: Erika Orozco MD) Mode of Transport at Discharge: BLS([facility] ) Transition of Care Consult (CM Consult): Discharge Planning, Home Hospice(Patient will be d/c home with [facility] [place] [place]) [facility]TL: Yes Discharge Durable Medical Equipment: No 
Physical Therapy Consult: Yes Occupational Therapy Consult: Yes Speech Therapy Consult: No 
Current Support Network: Lives with Spouse, Own Home Confirm Follow Up Transport: Family The Patient and/or Patient Representative was Provided with a Choice of Provider and Agrees with the Discharge Plan?: Yes Name of the Patient Representative Who was Provided with a Choice of Provider and Agrees with the Discharge Plan: spouse Freedom of Choice List was Provided with Basic Dialogue that Supports the Patient's Individualized Plan of Care/Goals, Treatment Preferences and Shares the Quality Data Associated with the Providers?: Yes [employer] Information Provided?: No 
Discharge Location Discharge Placement: Home with hospice(patient will be active with [facility] [place] [place])

## 2021-03-21 NOTE — PROGRESS NOTES
Problem: Falls - Risk of 
Goal: *Absence of Falls Description: Document Ina Patel Fall Risk and appropriate interventions in the flowsheet. Outcome: Resolved/Met Problem: Patient Education: Go to Patient Education Activity Goal: Patient/Family Education Outcome: Resolved/Met Problem: Risk for Spread of Infection Goal: Prevent transmission of infectious organism to others Description: Prevent the transmission of infectious organisms to other patients, staff members, and visitors. Outcome: Resolved/Met Problem: Patient Education:  Go to Education Activity Goal: Patient/Family Education Outcome: Resolved/Met Problem: Pressure Injury - Risk of 
Goal: *Prevention of pressure injury Description: Document David Scale and appropriate interventions in the flowsheet. Outcome: Resolved/Met Problem: Patient Education: Go to Patient Education Activity Goal: Patient/Family Education Outcome: Resolved/Met Problem: Patient Education: Go to Patient Education Activity Goal: Patient/Family Education Outcome: Resolved/Met Problem: Patient Education: Go to Patient Education Activity Goal: Patient/Family Education Outcome: Resolved/Met Problem: Patient Education: Go to Patient Education Activity Goal: Patient/Family Education Outcome: Resolved/Met

## 2021-03-21 NOTE — PROGRESS NOTES
Patient  time has been changes to 11am.  Aminata Gay with [facility] has made family aware of the time change. CM will continue to monitor and remain available for any needs or concerns that may occur.

## 2021-03-21 NOTE — PROGRESS NOTES
Report given to [address] Zambrano. Patient resting quietly in bed. Respirations present, even and unlabored on 2L NC. Bed low and locked, safety measures in place. No signs of distress, no needs expressed. Call light within reach, encouraged patient to call with any needs.

## 2021-03-24 PROBLEM — Z51.5 HOSPICE CARE: Status: ACTIVE | Noted: 2021-01-01

## 2021-03-29 NOTE — PROGRESS NOTES
phoned  [facility] dtr Ching to express condolences, describe/invite family to use [facility]ment services, and pray with dtr.  Dtr said pt passed peacefully and thanked  for call.  Dtr also she would consider participating in a bereavement support group in the future.

## 2022-10-31 NOTE — PROGRESS NOTES
MP,  Please see below [employer] message and advise.  Assume you need some sort of in person visit to fill this out?  Thanks,  Berenice WILLS RN     Progress Note Patient: Kole Escudero MRN: [id number]  SSN: [id number] YOB: 1940  Age: 79 y.o. Sex: male Admit Date: 3/13/2021 LOS: 5 days Assessment and Plan:  
79 y. o. male with medical history of RA on bi-weekly infusion, CAD, idiopathic thrombocytopenia, hypothyroidism, AAA and HTN presented with daughter at bedside as patient was confused Sepsis Bacteremia Mitral valve endocarditis Abx: Vanc/Zosyn (3/14-3/14) changed to Nafcillin (3/14-. ..) BCx 3/13: grew MSSA Repeat BCx 3/15: GPC 2/2 Repeat BCx 3/17 GPC 
TTE 3/16 w/ EF 55-60%; mod-severe MR with mitral valve vegetation suggesting endocarditis ID on board--Cont with Nafcillin Cardiology consulted--spoke with daughter in regards to AJITH. Daughter decided no surgery F/u BCx, ID and card recs Palliative care recs Hospice consulted 
  
Acute metabolic encephalopathy/delirium, improved but waxing and waning 
-Most likely AMS 2/2 above sepsis vs septic emboli given new findings of MV endocarditis. CT head no acute findings. On admission, mumbles, difficult to understand, does not follow commands Avoid sedating meds Fall precautions Delirium precautions PT/OT 
SLP to eval and treat--MBS done and recommended pureed diet with 1:1 assistance with meals Infection management as stated Vit B12, folate, ammonia, RPR, HIV, TSH/Free T4--unremarkable Considering MRI brain 
  
Left shoulder pain Had zoster over the posterior aspect of the left shoulder region few days ago. -Shoulder x-ray--no acute findings, only degenerative changes.   
-MRI shoulder pending--holding until improvement in mentation 
  
MING Avoid nephrotoxic meds Accurate I&O's 
Holding home diuretics/ACE-I/ARB MIVF 
  
Poor p.o. intake 
-as per daughter Lost about 5 pounds unintentionally.   
-Nutrition consult.   
-mIVF: Changed to D5W d/t hypernatremia 
-SLP to assist--MBS pending, ok for pureed diet for now 
  
Hypernatremia D/t poor po intake IVF: D5W BMP daily 
  
Rheumatoid arthritis Holding Leflunomide and Remicade 
  
Coronary disease:  
Cont home ASA, statin, ARB 
  
Hypothyroidism Cont home synthroid 
  
Abdominal aortic aneurysm Cont home statin 
  
History of idiopathic thrombocytopenia Monitor CBC 
  
Hypertension  
Normotensive 
  
DVT Prophylaxis: Lovenox SQ Subjective:  
79 y. o. male with medical history of RA on bi-weekly infusion, CAD, idiopathic thrombocytopenia, hypothyroidism, AAA and HTN presented with daughter at bedside as patient was confused cannot give any information. According to daughter, pt started feeling bad since the second Covid vaccine late February. On 3/10, he was complaining of left shoulder pain associated with confusion and mumbling speech and was taken to . Daughter was concerned that he might be having a stroke. However he was diagnosed with herpes zoster and was sent home.  He reportedly had an x-ray done at  of left shoulder with no fracture noted. 
 Patient's symptoms persisted with decreased p.o. intake with associated 5 lbs weight loss. He developed a fever morning of 3/13/2021, daughter gave him a dose of Tylenol and brought pt to the ER and was discharged home. Daughter noticed that patient was having chills and again having T103, hence brought patient back to the emergency room. Patient at baseline was independent with ADL's and actually can care of his wife who has dementia. On admission, patient was only mumbling with difficult to understand speech but did complain of pain on moving the LUE which was in flexed position. He was able to move all other extremities on his own except for left upper extremity but did not really follow verbal commands. ED workup includes WBC 13.2, hemoglobin 9.9, creatinine 1.25, GFR of 59. Rapid Covid test negative. CT head no acute findings. Chest x-ray shows low lung volumes with basilar consolidation.  
Pt was given Ceftriaxone and Azithromycin in ED. He is admitted for acute metabolic encephalopathy, sepsis secondary to pneumonia. Blood cultures grew MSSA. ID has been consulted. Antibiotics were changed to Nafcillin, f/u BCx. MRI shoulder--hold d/t mentation, anticipate 4 weeks abx course d/t immunosuppressives w/o obvious source. SLP recommended pureed diet. MBS completed 3/17. MRI brain pending. TTE 3/16 w/ EF 55-60%; mod-severe MR with mitral valve vegetation suggesting endocarditis. Patient seen and examined at bedside. Lethargic, arousable, confused, unable to obtain history. Objective:  
 
Vitals:  
 03/18/21 2326 03/19/21 3407 03/19/21 0645 03/19/21 1153 BP: 116/75 122/81 (!) 142/81 (!) 114/53 Pulse: 71 74 67 76 Resp: 18 20 20 20 Temp: 98.6 °F (37 °C) 98.1 °F (36.7 °C) 98.2 °F (36.8 °C) 98.3 °F (36.8 °C) SpO2: 94% 94% 95% 93% Weight:      
Height:      
  
 
Intake and Output: 
Current Shift: 03/19 0701 - 03/19 1900 In: 240 [P.O.:240] Out: - Last three shifts: 03/17 1901 - 03/19 0700 In: 458 [P.O.:458] Out: -  
 
ROS Unable to obtain due to patients condition Physical Exam:  
General: Lethargic, NAD HEENT: NC/AT, EOM are intact Neck: supple, no JVD Cardiovascular: RRR, S1, S2 
Respiratory: Lungs are clear, no wheezes or rales Abdomen: Soft, NT, ND Back: No CVA tenderness, no paraspinal tenderness Extremities: LE without pedal edema, no erythema Neuro: CN are intact, no focal deficits Skin: no rash or ulcers Psych: good mood and affect Lab/Data Review: 
I have personally reviewed patients laboratory data showing Recent Results (from the past 24 hour(s)) METABOLIC PANEL, BASIC Collection Time: 03/19/21  6:54 AM  
Result Value Ref Range Sodium 144 136 - 145 mmol/L Potassium 3.1 (L) 3.5 - 5.1 mmol/L Chloride 113 (H) 98 - 107 mmol/L  
 CO2 26 21 - 32 mmol/L Anion gap 5 (L) 7 - 16 mmol/L Glucose 122 (H) 65 - 100 mg/dL BUN 31 (H) 8 - 23 MG/DL  Creatinine 1.37 0.8 - 1.5 MG/DL  
 GFR est AA >60 >60 ml/min/1.73m2 GFR est non-AA 53 (L) >60 ml/min/1.73m2 Calcium 7.6 (L) 8.3 - 10.4 MG/DL  
CBC WITH AUTOMATED DIFF Collection Time: 03/19/21  6:54 AM  
Result Value Ref Range WBC 8.5 4.3 - 11.1 K/uL  
 RBC 3.16 (L) 4.23 - 5.6 M/uL HGB 9.6 (L) 13.6 - 17.2 g/dL HCT 30.7 (L) 41.1 - 50.3 % MCV 97.2 79.6 - 97.8 FL  
 MCH 30.4 26.1 - 32.9 PG  
 MCHC 31.3 (L) 31.4 - 35.0 g/dL  
 RDW 14.5 11.9 - 14.6 % PLATELET 92 (L) 856 - 450 K/uL MPV 11.1 9.4 - 12.3 FL ABSOLUTE NRBC 0.00 0.0 - 0.2 K/uL  
 DF AUTOMATED NEUTROPHILS 66 43 - 78 % LYMPHOCYTES 19 13 - 44 % MONOCYTES 11 4.0 - 12.0 % EOSINOPHILS 3 0.5 - 7.8 % BASOPHILS 0 0.0 - 2.0 % IMMATURE GRANULOCYTES 1 0.0 - 5.0 %  
 ABS. NEUTROPHILS 5.6 1.7 - 8.2 K/UL  
 ABS. LYMPHOCYTES 1.6 0.5 - 4.6 K/UL  
 ABS. MONOCYTES 0.9 0.1 - 1.3 K/UL  
 ABS. EOSINOPHILS 0.3 0.0 - 0.8 K/UL  
 ABS. BASOPHILS 0.0 0.0 - 0.2 K/UL  
 ABS. IMM. GRANS. 0.1 0.0 - 0.5 K/UL All Micro Results Procedure Component Value Units Date/Time CULTURE, BLOOD [[id number]] Collected: 03/18/21 1425 Order Status: Completed Specimen: Blood Updated: 03/19/21 1212 Special Requests: --     
  LEFT Antecubital 
  
  Culture result: NO GROWTH AFTER 21 HOURS     
 CULTURE, BLOOD [[id number]] Collected: 03/18/21 1425 Order Status: Completed Specimen: Blood Updated: 03/19/21 1212 Special Requests: --     
  RIGHT Antecubital 
  
  Culture result: NO GROWTH AFTER 21 HOURS     
 CULTURE, BLOOD [[id number]]  (Abnormal) Collected: 03/17/21 9869 Order Status: Completed Specimen: Blood Updated: 03/19/21 6117 Special Requests: --     
  LEFT 
HAND 
  
  GRAM STAIN    
  GRAM POS COCCI IN CLUSTERS  
     
   PEDIATRIC BOTTLE     
      
  CRITICAL RESULT NOT CALLED DUE TO PREVIOUS NOTIFICATION OF CRITICAL RESULT WITHIN THE LAST 24 HOURS. Culture result:   STAPHYLOCOCCUS SPECIES SUBCULTURE IN PROGRESS  
     
 CULTURE, BLOOD [[id number]] (Abnormal) Collected: 03/17/21 2634 Order Status: Completed Specimen: Blood Updated: 03/19/21 0750 Special Requests: --     
  RIGHT 
FOREARM 
  
  GRAM STAIN    
  GRAM POS COCCI IN CLUSTERS  
     
   AEROBIC BOTTLE POSITIVE RESULTS VERIFIED, PHONED TO AND READ BACK BY COLTON OSPINA AT 9023 ON 3/18/2021 Culture result: STAPHYLOCOCCUS AUREUS SENSITIVITY TO FOLLOW Refer to Blood Culture ID Panel Accession [id number] BLOOD CULTURE ID PANEL [[id number]]  (Abnormal) Collected: 03/17/21 9805 Order Status: Completed Specimen: Blood Updated: 03/18/21 3258 Acc. no. from [employer] [id number] Staphylococcus Detected Staphylococcus aureus Detected Comment: RESULTS VERIFIED, PHONED TO AND READ BACK BY 
COLTON OSPINA AT 1098 ON 3/18/2021 
  
  
  mecA (Methicillin-Resistance Genes) NOT DETECTED INTERPRETATION Gram positive cocci in clusters, identified in realtime PCR as probable MSSA. Comment: Recommend discontinuing IV vancomycin starting cefazolin or nafcillin if patient not on beta-lactam therapy. Infectious Diseases Consult recommended in adult patients. THIS TEST DOES NOT REPLACE SENSITIVITY TESTING. CULTURE, BLOOD [[id number]]  (Abnormal) Collected: 03/15/21 3042 Order Status: Completed Specimen: Blood Updated: 03/17/21 0750 Special Requests: --     
  LEFT 
ARM 
  
  GRAM STAIN AEROBIC BOTTLE POSITIVE     
      
  GRAM POS COCCI IN CLUSTERS RESULTS VERIFIED, PHONED TO AND READ BACK BY Celestina Ramos RN [id number] ON [id number] [place] Culture result: STAPHYLOCOCCUS AUREUS For Susceptibility Refer to Culture [address] [id number] CULTURE, BLOOD [[id number]]  (Abnormal) Collected: 03/15/21 1188 Order Status: Completed Specimen: Blood Updated: 03/17/21 1617   Special Requests: --     
  LEFT 
HAND 
  
  GRAM STAIN AEROBIC BOTTLE POSITIVE     
      
  GRAM POS COCCI IN CLUSTERS  
 RESULTS VERIFIED, PHONED TO AND READ BACK BY Vicky Flores RN [id number] ON [id number] [place] Culture result: STAPHYLOCOCCUS AUREUS For Susceptibility Refer to Culture [address] [id number] CULTURE, BLOOD [[id number]]  (Abnormal) Collected: 03/13/21 2114 Order Status: Completed Specimen: Blood Updated: 03/16/21 0557 Special Requests: --     
  LEFT Antecubital 
  
  GRAM STAIN    
  GRAM POS COCCI IN CLUSTERS  
     
      
  AEROBIC AND ANAEROBIC BOTTLES  
     
      
  CRITICAL RESULT NOT CALLED DUE TO PREVIOUS NOTIFICATION OF CRITICAL RESULT WITHIN THE LAST 24 HOURS. Culture result: STAPHYLOCOCCUS AUREUS For Susceptibility Refer to Culture [address] NO. [id number] CULTURE, BLOOD [[id number]]  (Abnormal)  (Susceptibility) Collected: 03/13/21 2114 Order Status: Completed Specimen: Blood Updated: 03/16/21 0557 Special Requests: --     
  RIGHT Antecubital 
  
  GRAM STAIN    
  GRAM POS COCCI IN CLUSTERS  
     
      
  AEROBIC AND ANAEROBIC BOTTLES RESULTS VERIFIED, PHONED TO AND READ BACK BY Kelvin Bailey RN @ 1120 ON 3/14/21 BY AMM Culture result: STAPHYLOCOCCUS AUREUS Refer to Blood Culture ID Panel Accession [id number] C. DIFFICILE AG & TOXIN A/B [[id number]] Collected: 03/14/21 2219 Order Status: Completed Specimen: Stool Updated: 03/15/21 1303 [id number] ANTIGEN    
  C. DIFFICILE GDH ANTIGEN-NEGATIVE  
     
  C. difficile toxin C. DIFFICILE TOXIN-NEGATIVE  
     
  PCR Reflex NOT APPLICABLE INTERPRETATION    
  NEGATIVE FOR TOXIGENIC C. DIFFICILE Clinical Consideration NEGATIVE FOR TOXIGENIC C. DIFFICILE  
     
 BLOOD CULTURE ID PANEL [[id number]]  (Abnormal) Collected: 03/13/21 2114 Order Status: Completed Specimen: Blood Updated: 03/14/21 1120 Acc. no. from [employer] [id number] Staphylococcus Detected Staphylococcus aureus Detected   Comment: RESULTS VERIFIED, PHONED TO AND READ BACK BY 
Alfonso Ribeiro, RN @ 1120 ON 3/14/21 BY AMM 
  
  
  mecA (Methicillin-Resistance Genes) NOT DETECTED INTERPRETATION Gram positive cocci in clusters, identified in realtime PCR as probable MSSA. Comment: Recommend discontinuing IV vancomycin starting cefazolin or nafcillin if patient not on beta-lactam therapy. Infectious Diseases Consult recommended in adult patients. THIS TEST DOES NOT REPLACE SENSITIVITY TESTING. COVID-19 RAPID TEST [[id number]] Collected: 03/13/21 2357 Order Status: Completed Specimen: Nasopharyngeal Updated: 03/14/21 0038 Specimen source Nasopharyngeal     
  COVID-19 rapid test Not detected Comment:     
The specimen is NEGATIVE for SARS-CoV-2, the novel coronavirus associated with COVID-19. A negative result does not rule out COVID-19. This test has been authorized by the FDA under an Emergency Use Authorization (EUA) for use by authorized laboratories. Fact sheet for Healthcare Providers: [employer] Fact sheet for Patients: [employer] Methodology: Isothermal Nucleic Acid Amplification COVID-19 RAPID TEST [[id number]] Collected: 03/13/21 2357 Order Status: Canceled Image: 
I have personally reviewed patients imaging showing XR SWALLOW FUNC VIDEO Final Result Laryngeal penetration without tracheal aspiration  Please see speech  
pathologist report for further details. XR SHOULDER LT AP/LAT MIN 2 V Final Result Mild degenerative change. No acute findings. CT HEAD WO CONT Final Result 1. No CT evidence of acute intracranial process. XR CHEST PORT Final Result Low lung volumes with basilar consolidation. Hospital problems Principal Problem: 
  Sepsis ([facility] [address] (3/14/2021) Active Problems: 
  Rheumatoid arthritis ([address] (8/1/2012)   Hypothyroidism (8/1/2012) Mixed hyperlipidemia (8/1/2012) Chronic idiopathic thrombocytopenia ([facility] (8/1/2012) Overview: S/P W/U Dr Helio Miranda AAA (abdominal aortic aneurysm) ([facility] (2/6/2013) Overview: Followed by Vascular Surgery Acute metabolic encephalopathy (5/75/6357) PNA (pneumonia) (3/14/2021) Suspected COVID-19 virus infection (3/14/2021) Bacteremia (3/14/2021) MING (acute kidney injury) ([facility] (3/17/2021) Endocarditis of mitral valve (3/17/2021) I have reviewed, updated, and verified this note's content and spent 38 minutes of my 42 minutes visit performing counseling and coordination of care regarding medical management. Signed By: Amparo Win MD   
 March 19, 2021

## 2024-07-10 NOTE — Clinical Note
Please note pt declined offer for f/u with pcp on 7/17 states he was told at d/c not to travel for 14 days and will schedule when he is better. Per phn call in cc.   Ty!
verbal cues/1 person assist

## (undated) DEVICE — MASTISOL ADHESIVE LIQ 2/3ML

## (undated) DEVICE — 2000CC GUARDIAN II: Brand: GUARDIAN

## (undated) DEVICE — BUTTON SWITCH PENCIL BLADE ELECTRODE, HOLSTER: Brand: EDGE

## (undated) DEVICE — INTENDED FOR TISSUE SEPARATION, AND OTHER PROCEDURES THAT REQUIRE A SHARP SURGICAL BLADE TO PUNCTURE OR CUT.: Brand: BARD-PARKER SAFETY BLADES SIZE 15, STERILE

## (undated) DEVICE — SURGIFOAM SPNG SZ 100

## (undated) DEVICE — BIPOLAR SEALER 23-112-1 AQM 6.0: Brand: AQUAMANTYS ®

## (undated) DEVICE — DRAPE SHT 3 QTR PROXIMA 53X77 --

## (undated) DEVICE — JAM SHIDI: Brand: XIA PRECISION SYSTEM

## (undated) DEVICE — KIT EVAC 400CC DIA3/16IN H PAT 12.5IN 3 SPR RND SHP PVC DRN

## (undated) DEVICE — DRAPE TWL SURG 16X26IN BLU ORB04] ALLCARE INC]

## (undated) DEVICE — INTENDED FOR TISSUE SEPARATION, AND OTHER PROCEDURES THAT REQUIRE A SHARP SURGICAL BLADE TO PUNCTURE OR CUT.: Brand: BARD-PARKER SAFETY BLADES SIZE 10, STERILE

## (undated) DEVICE — DRAPE XR C ARM 41X74IN LF --

## (undated) DEVICE — SUTURE VCRL + 3-0 L27IN ABSRB UD PS-2 L19MM 3/8 CIR PRIM VCP427H

## (undated) DEVICE — WAX SURG 2.5GM HEMSTAT BNE BEESWAX PARAFFIN ISO PALMITATE

## (undated) DEVICE — (D)PREP SKN CHLRAPRP APPL 26ML -- CONVERT TO ITEM 371833

## (undated) DEVICE — REM POLYHESIVE ADULT PATIENT RETURN ELECTRODE: Brand: VALLEYLAB

## (undated) DEVICE — SUTURE VCRL SZ 2-0 L27IN ABSRB UD L36MM CP-1 1/2 CIR REV J266H

## (undated) DEVICE — SUTURE VCRL SZ 1 L27IN ABSRB UD L36MM CP-1 1/2 CIR REV CUT J268H

## (undated) DEVICE — 1010 S-DRAPE TOWEL DRAPE 10/BX: Brand: STERI-DRAPE™

## (undated) DEVICE — 3M™ TEGADERM™ TRANSPARENT FILM DRESSING FRAME STYLE, 1629, 8 IN X 12 IN (20 CM X 30 CM), 10/CT 8CT/CASE: Brand: 3M™ TEGADERM™

## (undated) DEVICE — AMD ANTIMICROBIAL GAUZE SPONGES,12 PLY USP TYPE VII, 0.2% POLYHEXAMETHYLENE BIGUANIDE HCI (PHMB): Brand: CURITY

## (undated) DEVICE — CONTAINER,SPECIMEN,O.R.STRL,4.5OZ: Brand: MEDLINE

## (undated) DEVICE — 20 ML SYRINGE LUER-LOCK TIP: Brand: MONOJECT

## (undated) DEVICE — KIT POS W/ FOAM ARM CRADL SHEARGUARD CHST PD CVR FOR SPNL

## (undated) DEVICE — KENDALL SCD EXPRESS SLEEVES, KNEE LENGTH, MEDIUM: Brand: KENDALL SCD

## (undated) DEVICE — SOLUTION IV 1000ML 0.9% SOD CHL

## (undated) DEVICE — X-RAY SPONGES,12 PLY: Brand: DERMACEA

## (undated) DEVICE — PACK PROCEDURE SURG POST LAMINECTOMY CDS

## (undated) DEVICE — 3M™ STERI-STRIP™ REINFORCED ADHESIVE SKIN CLOSURES, R1548, 1 IN X 5 IN (25 MM X 125 MM), 4 STRIPS/ENVELOPE: Brand: 3M™ STERI-STRIP™

## (undated) DEVICE — TRAY CATH 16F URIN MTR LTX -- CONVERT TO ITEM 363111

## (undated) DEVICE — 5.0MM PRECISION ROUND

## (undated) DEVICE — FLOSEAL MATRIX IS INDICATED IN SURGICAL PROCEDURES (OTHER THAN IN OPHTHALMIC) AS AN ADJUNCT TO HEMOSTASIS WHEN CONTROL OF BLEEDING BY LIGATURE OR CONVENTIONAL PROCEDURES IS INEFFECTIVE OR IMPRACTICAL.: Brand: FLOSEAL HEMOSTATIC MATRIX